# Patient Record
Sex: MALE | Race: WHITE | NOT HISPANIC OR LATINO | Employment: FULL TIME | URBAN - METROPOLITAN AREA
[De-identification: names, ages, dates, MRNs, and addresses within clinical notes are randomized per-mention and may not be internally consistent; named-entity substitution may affect disease eponyms.]

---

## 2017-01-16 DIAGNOSIS — M32.9 SYSTEMIC LUPUS ERYTHEMATOSUS (H): Primary | ICD-10-CM

## 2017-01-16 DIAGNOSIS — Z79.899 ENCOUNTER FOR LONG-TERM (CURRENT) USE OF MEDICATIONS: ICD-10-CM

## 2017-01-16 LAB
ALBUMIN UR-MCNC: NEGATIVE MG/DL
APPEARANCE UR: CLEAR
BASOPHILS # BLD AUTO: 0 10E9/L (ref 0–0.2)
BASOPHILS NFR BLD AUTO: 0.6 %
BILIRUB UR QL STRIP: NEGATIVE
COLOR UR AUTO: YELLOW
DIFFERENTIAL METHOD BLD: ABNORMAL
EOSINOPHIL # BLD AUTO: 0.3 10E9/L (ref 0–0.7)
EOSINOPHIL NFR BLD AUTO: 5.3 %
ERYTHROCYTE [DISTWIDTH] IN BLOOD BY AUTOMATED COUNT: 13.8 % (ref 10–15)
GLUCOSE UR STRIP-MCNC: NEGATIVE MG/DL
HCT VFR BLD AUTO: 43.5 % (ref 40–53)
HGB BLD-MCNC: 14.7 G/DL (ref 13.3–17.7)
HGB UR QL STRIP: ABNORMAL
KETONES UR STRIP-MCNC: NEGATIVE MG/DL
LEUKOCYTE ESTERASE UR QL STRIP: NEGATIVE
LYMPHOCYTES # BLD AUTO: 0.7 10E9/L (ref 0.8–5.3)
LYMPHOCYTES NFR BLD AUTO: 13.9 %
MCH RBC QN AUTO: 30.8 PG (ref 26.5–33)
MCHC RBC AUTO-ENTMCNC: 33.8 G/DL (ref 31.5–36.5)
MCV RBC AUTO: 91 FL (ref 78–100)
MONOCYTES # BLD AUTO: 0.6 10E9/L (ref 0–1.3)
MONOCYTES NFR BLD AUTO: 12 %
NEUTROPHILS # BLD AUTO: 3.2 10E9/L (ref 1.6–8.3)
NEUTROPHILS NFR BLD AUTO: 68.2 %
NITRATE UR QL: NEGATIVE
PH UR STRIP: 6 PH (ref 5–7)
PLATELET # BLD AUTO: 178 10E9/L (ref 150–450)
RBC # BLD AUTO: 4.78 10E12/L (ref 4.4–5.9)
RBC #/AREA URNS AUTO: NORMAL /HPF (ref 0–2)
SP GR UR STRIP: >1.03 (ref 1–1.03)
URN SPEC COLLECT METH UR: ABNORMAL
UROBILINOGEN UR STRIP-ACNC: 0.2 EU/DL (ref 0.2–1)
WBC # BLD AUTO: 4.7 10E9/L (ref 4–11)
WBC #/AREA URNS AUTO: NORMAL /HPF (ref 0–2)

## 2017-01-16 PROCEDURE — 84460 ALANINE AMINO (ALT) (SGPT): CPT | Performed by: INTERNAL MEDICINE

## 2017-01-16 PROCEDURE — 81001 URINALYSIS AUTO W/SCOPE: CPT | Performed by: INTERNAL MEDICINE

## 2017-01-16 PROCEDURE — 84450 TRANSFERASE (AST) (SGOT): CPT | Performed by: INTERNAL MEDICINE

## 2017-01-16 PROCEDURE — 82565 ASSAY OF CREATININE: CPT | Performed by: INTERNAL MEDICINE

## 2017-01-16 PROCEDURE — 85025 COMPLETE CBC W/AUTO DIFF WBC: CPT | Performed by: INTERNAL MEDICINE

## 2017-01-16 PROCEDURE — 36415 COLL VENOUS BLD VENIPUNCTURE: CPT | Performed by: INTERNAL MEDICINE

## 2017-01-17 LAB
ALT SERPL W P-5'-P-CCNC: 24 U/L (ref 0–70)
AST SERPL W P-5'-P-CCNC: 21 U/L (ref 0–45)
CREAT SERPL-MCNC: 1.07 MG/DL (ref 0.66–1.25)
GFR SERPL CREATININE-BSD FRML MDRD: 75 ML/MIN/1.7M2

## 2017-02-28 DIAGNOSIS — N20.0 RENAL STONE: Primary | ICD-10-CM

## 2017-03-01 ENCOUNTER — HOSPITAL ENCOUNTER (OUTPATIENT)
Dept: CT IMAGING | Facility: CLINIC | Age: 42
Discharge: HOME | End: 2017-03-01
Attending: INTERNAL MEDICINE | Admitting: INTERNAL MEDICINE
Payer: COMMERCIAL

## 2017-03-01 DIAGNOSIS — N20.0 RENAL STONE: ICD-10-CM

## 2017-03-01 LAB
ALBUMIN UR-MCNC: NEGATIVE MG/DL
AMORPH CRY #/AREA URNS HPF: ABNORMAL /HPF
APPEARANCE UR: CLEAR
BILIRUB UR QL STRIP: NEGATIVE
COLOR UR AUTO: YELLOW
GLUCOSE UR STRIP-MCNC: NEGATIVE MG/DL
HGB UR QL STRIP: ABNORMAL
KETONES UR STRIP-MCNC: NEGATIVE MG/DL
LEUKOCYTE ESTERASE UR QL STRIP: NEGATIVE
MUCOUS THREADS #/AREA URNS LPF: PRESENT /LPF
NITRATE UR QL: NEGATIVE
PH UR STRIP: 7 PH (ref 5–7)
RBC #/AREA URNS AUTO: ABNORMAL /HPF (ref 0–2)
SP GR UR STRIP: 1.02 (ref 1–1.03)
URN SPEC COLLECT METH UR: ABNORMAL
UROBILINOGEN UR STRIP-ACNC: 0.2 EU/DL (ref 0.2–1)
WBC #/AREA URNS AUTO: ABNORMAL /HPF (ref 0–2)

## 2017-03-01 PROCEDURE — 36415 COLL VENOUS BLD VENIPUNCTURE: CPT | Performed by: INTERNAL MEDICINE

## 2017-03-01 PROCEDURE — 81001 URINALYSIS AUTO W/SCOPE: CPT | Performed by: INTERNAL MEDICINE

## 2017-03-01 PROCEDURE — 80048 BASIC METABOLIC PNL TOTAL CA: CPT | Performed by: INTERNAL MEDICINE

## 2017-03-01 PROCEDURE — 87086 URINE CULTURE/COLONY COUNT: CPT | Performed by: INTERNAL MEDICINE

## 2017-03-01 PROCEDURE — 74176 CT ABD & PELVIS W/O CONTRAST: CPT

## 2017-03-02 LAB
ANION GAP SERPL CALCULATED.3IONS-SCNC: 9 MMOL/L (ref 3–14)
BACTERIA SPEC CULT: NORMAL
BUN SERPL-MCNC: 14 MG/DL (ref 7–30)
CALCIUM SERPL-MCNC: 9 MG/DL (ref 8.5–10.1)
CHLORIDE SERPL-SCNC: 106 MMOL/L (ref 94–109)
CO2 SERPL-SCNC: 28 MMOL/L (ref 20–32)
CREAT SERPL-MCNC: 0.96 MG/DL (ref 0.66–1.25)
GFR SERPL CREATININE-BSD FRML MDRD: 85 ML/MIN/1.7M2
GLUCOSE SERPL-MCNC: 132 MG/DL (ref 70–99)
MICRO REPORT STATUS: NORMAL
POTASSIUM SERPL-SCNC: 3.6 MMOL/L (ref 3.4–5.3)
SODIUM SERPL-SCNC: 143 MMOL/L (ref 133–144)
SPECIMEN SOURCE: NORMAL

## 2017-03-08 ENCOUNTER — TRANSFERRED RECORDS (OUTPATIENT)
Dept: HEALTH INFORMATION MANAGEMENT | Facility: CLINIC | Age: 42
End: 2017-03-08

## 2017-08-01 DIAGNOSIS — M32.9 SYSTEMIC LUPUS ERYTHEMATOSUS (H): ICD-10-CM

## 2017-08-01 DIAGNOSIS — Z79.899 ENCOUNTER FOR LONG-TERM (CURRENT) USE OF MEDICATIONS: ICD-10-CM

## 2017-08-01 LAB
ALBUMIN UR-MCNC: NEGATIVE MG/DL
ALT SERPL W P-5'-P-CCNC: 30 U/L (ref 0–70)
APPEARANCE UR: CLEAR
AST SERPL W P-5'-P-CCNC: 19 U/L (ref 0–45)
BACTERIA #/AREA URNS HPF: ABNORMAL /HPF
BASOPHILS # BLD AUTO: 0 10E9/L (ref 0–0.2)
BASOPHILS NFR BLD AUTO: 0.9 %
BILIRUB UR QL STRIP: NEGATIVE
COLOR UR AUTO: YELLOW
CREAT SERPL-MCNC: 1.05 MG/DL (ref 0.66–1.25)
DIFFERENTIAL METHOD BLD: NORMAL
EOSINOPHIL # BLD AUTO: 0.4 10E9/L (ref 0–0.7)
EOSINOPHIL NFR BLD AUTO: 8.4 %
ERYTHROCYTE [DISTWIDTH] IN BLOOD BY AUTOMATED COUNT: 13.5 % (ref 10–15)
GFR SERPL CREATININE-BSD FRML MDRD: 77 ML/MIN/1.7M2
GLUCOSE UR STRIP-MCNC: NEGATIVE MG/DL
HCT VFR BLD AUTO: 42.6 % (ref 40–53)
HGB BLD-MCNC: 14.6 G/DL (ref 13.3–17.7)
HGB UR QL STRIP: ABNORMAL
KETONES UR STRIP-MCNC: NEGATIVE MG/DL
LEUKOCYTE ESTERASE UR QL STRIP: NEGATIVE
LYMPHOCYTES # BLD AUTO: 1 10E9/L (ref 0.8–5.3)
LYMPHOCYTES NFR BLD AUTO: 20.9 %
MCH RBC QN AUTO: 31.2 PG (ref 26.5–33)
MCHC RBC AUTO-ENTMCNC: 34.3 G/DL (ref 31.5–36.5)
MCV RBC AUTO: 91 FL (ref 78–100)
MONOCYTES # BLD AUTO: 0.5 10E9/L (ref 0–1.3)
MONOCYTES NFR BLD AUTO: 9.7 %
MUCOUS THREADS #/AREA URNS LPF: PRESENT /LPF
NEUTROPHILS # BLD AUTO: 2.8 10E9/L (ref 1.6–8.3)
NEUTROPHILS NFR BLD AUTO: 60.1 %
NITRATE UR QL: NEGATIVE
NON-SQ EPI CELLS #/AREA URNS LPF: ABNORMAL /LPF
PH UR STRIP: 7 PH (ref 5–7)
PLATELET # BLD AUTO: 172 10E9/L (ref 150–450)
RBC # BLD AUTO: 4.68 10E12/L (ref 4.4–5.9)
RBC #/AREA URNS AUTO: ABNORMAL /HPF (ref 0–2)
SP GR UR STRIP: 1.02 (ref 1–1.03)
URN SPEC COLLECT METH UR: ABNORMAL
UROBILINOGEN UR STRIP-ACNC: 0.2 EU/DL (ref 0.2–1)
WBC # BLD AUTO: 4.6 10E9/L (ref 4–11)
WBC #/AREA URNS AUTO: ABNORMAL /HPF (ref 0–2)

## 2017-08-01 PROCEDURE — 81001 URINALYSIS AUTO W/SCOPE: CPT | Performed by: INTERNAL MEDICINE

## 2017-08-01 PROCEDURE — 84450 TRANSFERASE (AST) (SGOT): CPT | Performed by: INTERNAL MEDICINE

## 2017-08-01 PROCEDURE — 84460 ALANINE AMINO (ALT) (SGPT): CPT | Performed by: INTERNAL MEDICINE

## 2017-08-01 PROCEDURE — 82565 ASSAY OF CREATININE: CPT | Performed by: INTERNAL MEDICINE

## 2017-08-01 PROCEDURE — 36415 COLL VENOUS BLD VENIPUNCTURE: CPT | Performed by: INTERNAL MEDICINE

## 2017-08-01 PROCEDURE — 85025 COMPLETE CBC W/AUTO DIFF WBC: CPT | Performed by: INTERNAL MEDICINE

## 2017-08-24 ENCOUNTER — TRANSFERRED RECORDS (OUTPATIENT)
Dept: HEALTH INFORMATION MANAGEMENT | Facility: CLINIC | Age: 42
End: 2017-08-24

## 2017-09-13 ENCOUNTER — TRANSFERRED RECORDS (OUTPATIENT)
Dept: HEALTH INFORMATION MANAGEMENT | Facility: CLINIC | Age: 42
End: 2017-09-13

## 2017-11-14 ENCOUNTER — OFFICE VISIT (OUTPATIENT)
Dept: URGENT CARE | Facility: URGENT CARE | Age: 42
End: 2017-11-14
Payer: COMMERCIAL

## 2017-11-14 ENCOUNTER — RADIANT APPOINTMENT (OUTPATIENT)
Dept: GENERAL RADIOLOGY | Facility: CLINIC | Age: 42
End: 2017-11-14
Attending: FAMILY MEDICINE
Payer: COMMERCIAL

## 2017-11-14 VITALS
TEMPERATURE: 98.3 F | HEART RATE: 88 BPM | DIASTOLIC BLOOD PRESSURE: 90 MMHG | SYSTOLIC BLOOD PRESSURE: 136 MMHG | OXYGEN SATURATION: 96 %

## 2017-11-14 DIAGNOSIS — M79.674 PAIN OF TOE OF RIGHT FOOT: ICD-10-CM

## 2017-11-14 DIAGNOSIS — S92.501A CLOSED FRACTURE OF PHALANX OF RIGHT FOURTH TOE, INITIAL ENCOUNTER: Primary | ICD-10-CM

## 2017-11-14 PROCEDURE — 99213 OFFICE O/P EST LOW 20 MIN: CPT | Performed by: FAMILY MEDICINE

## 2017-11-14 PROCEDURE — 73660 X-RAY EXAM OF TOE(S): CPT | Mod: RT

## 2017-11-14 RX ORDER — WARFARIN SODIUM 10 MG/1
TABLET ORAL
Refills: 3 | Status: ON HOLD | COMMUNITY
Start: 2017-02-06 | End: 2023-09-07

## 2017-11-14 RX ORDER — PROPRANOLOL HYDROCHLORIDE 10 MG/1
TABLET ORAL
COMMUNITY
Start: 2014-08-26 | End: 2022-11-21

## 2017-11-14 RX ORDER — AZATHIOPRINE 50 MG/1
TABLET ORAL
Refills: 1 | COMMUNITY
Start: 2017-02-06 | End: 2020-12-11

## 2017-11-14 ASSESSMENT — PAIN SCALES - GENERAL: PAINLEVEL: EXTREME PAIN (8)

## 2017-11-14 NOTE — MR AVS SNAPSHOT
After Visit Summary   11/14/2017    Jim Flores    MRN: [id number]           Patient Information     Date Of Birth          1975        Visit Information        Provider Department      11/14/2017 6:10 PM Paris Briones MD [facility]        Today's Diagnoses     Closed fracture of phalanx of right fourth toe, initial encounter    -  1    Pain of toe of right foot           Follow-ups after your visit        Who to contact     If you have questions or need follow up information about today's clinic visit or your schedule please contact [facility] directly at [phone].  Normal or non-critical lab and imaging results will be communicated to you by MyChart, letter or phone within 4 business days after the clinic has received the results. If you do not hear from us within 7 days, please contact the clinic through [employer] or phone. If you have a critical or abnormal lab result, we will notify you by phone as soon as possible.  Submit refill requests through [employer] or call your pharmacy and they will forward the refill request to us. Please allow 3 business days for your refill to be completed.          Additional Information About Your Visit        MyChart Information     [employer] gives you secure access to your electronic health record. If you see a primary care provider, you can also send messages to your care team and make appointments. If you have questions, please call your primary care clinic.  If you do not have a primary care provider, please call [phone] and they will assist you.        Care EveryWhere ID     This is your Care EveryWhere ID. This could be used by other organizations to access your [place] medical records  [id number]        Your Vitals Were     Pulse Temperature Pulse Oximetry             88 98.3  F (36.8  C) (Oral) 96%          Blood Pressure from Last 3 Encounters:   11/14/17 136/90   06/06/16 142/86   03/25/16 122/68    Weight  from Last 3 Encounters:   03/25/16 180 lb 11.2 oz (82 kg)   01/12/16 182 lb 1.6 oz (82.6 kg)   11/24/15 178 lb 9.6 oz (81 kg)                 Today's Medication Changes          These changes are accurate as of: 11/14/17  8:29 PM.  If you have any questions, ask your nurse or doctor.               Start taking these medicines.        Dose/Directions    order for DME   Used for:  Closed fracture of phalanx of right fourth toe, initial encounter   Started by:  Paris Briones MD        Post-op shoe   Quantity:  1 each   Refills:  0            Where to get your medicines      Some of these will need a paper prescription and others can be bought over the counter.  Ask your nurse if you have questions.     Bring a paper prescription for each of these medications     order for DME                Primary Care Provider Office Phone # Fax #    Good Mooney [phone] [address] [place] [address]        Equal Access to Services     [facility] AH: Hadii aad ku hadasho Soomaali, waaxda luqadaha, qaybta kaalmada adeegyada, waxay idiin haydenilsonn ari rosado . So [facility] [phone].    ATENCIÓN: Si habla español, tiene a dang disposición servicios gratuitos de asistencia lingüística. Llame al [phone].    We comply with applicable federal civil rights laws and [place] laws. We do not discriminate on the basis of race, color, national origin, age, disability, sex, sexual orientation, or gender identity.            Thank you!     Thank you for choosing [facility]  for your care. Our goal is always to provide you with excellent care. Hearing back from our patients is one way we can continue to improve our services. Please take a few minutes to complete the written survey that you may receive in the mail after your visit with us. Thank you!             Your Updated Medication List - Protect others around you: Learn how to safely use, store and throw away your  medicines at www.disposemymeds.org.          This list is accurate as of: 11/14/17  8:29 PM.  Always use your most recent med list.                   Brand Name Dispense Instructions for use Diagnosis    Acetyl-L-Carnitine HCl Powd      Take 500 mg by mouth daily        azaTHIOprine 50 MG tablet    IMURAN     TK 1 T PO BID        BUSPAR 15 MG tablet   Generic drug:  busPIRone     90 tablet    Take 2 tablets by mouth 2 times daily.        desonide 0.05 % cream    DESOWEN     Apply topically three times a week        DOCOSAHEXAENOIC ACID PO      3,000mg daily        Fish Oil 1200 MG Cpdr      Take 2,400 Units by mouth daily        FLONASE 50 MCG/ACT spray   Generic drug:  fluticasone      Spray 1-2 sprays into both nostrils daily as needed for rhinitis or allergies        hydrochlorothiazide 25 MG tablet    HYDRODIURIL    90 tablet    Take 1 tablet (25 mg) by mouth daily    Calculus of kidney       LORazepam 0.5 MG tablet    ATIVAN    30 tablet    Take 1 tablet by mouth every 6 hours as needed for anxiety.        NALTREXONE HCL PO      Take 4.5 mg by mouth daily        NONFORMULARY      Take 2 capsules by mouth every morning Methyl Guard Plus        order for DME     1 each    Post-op shoe    Closed fracture of phalanx of right fourth toe, initial encounter       PLAQUENIL 200 MG tablet   Generic drug:  hydroxychloroquine      Take 200 mg by mouth 2 times daily.        predniSONE 5 MG tablet    DELTASONE     Take 3 tablets (15 mg) by mouth daily        PROBIOTIC DAILY PO      Take 1 capsule by mouth daily        propranolol 10 MG tablet    INDERAL     prn        venlafaxine 150 MG 24 hr capsule    EFFEXOR-XR    30 capsule    Take 1 capsule by mouth 2 times daily.        VITAMIN D (CHOLECALCIFEROL) PO      Take 2,000 Units by mouth 2 times daily        VITAMIN E NATURAL PO      Take 400 Units by mouth daily        * warfarin 4 MG tablet    COUMADIN    8 tablet    Take 2 tablets (8 mg) by mouth daily for 4 days    Lupus  anticoagulant disorder (H)       * warfarin 10 MG tablet    COUMADIN     TK 1.5 TS D OR AS DIRECTED BASED ON LAB RESULTS        * Notice:  This list has 2 medication(s) that are the same as other medications prescribed for you. Read the directions carefully, and ask your doctor or other care provider to review them with you.

## 2017-11-15 NOTE — NURSING NOTE
"Chief Complaint   Patient presents with     Urgent Care     Patient ran into door frame this morning and is experiencing pain, swelling and bruising in his 4th toe on his right foot.       Initial /90 (BP Location: Right arm, Patient Position: Sitting, Cuff Size: Adult Regular)  Pulse 88  Temp 98.3  F (36.8  C) (Oral)  SpO2 96% Estimated body mass index is 26.68 kg/(m^2) as calculated from the following:    Height as of 3/25/16: 5' 9\" (1.753 m).    Weight as of 3/25/16: 180 lb 11.2 oz (82 kg).  Medication Reconciliation: complete   Sakshi Phelps CMA (AAMA)            "

## 2017-11-15 NOTE — PROGRESS NOTES
SUBJECTIVE:  Chief Complaint   Patient presents with     Urgent Care     Patient ran into door frame this morning and is experiencing pain, swelling and bruising in his 4th toe on his right foot.     Jim Flores is a 42 year old male presents with a chief complaint of right foot pain and swelling, bruising on 4th toe.  The injury occurred 10 hours(s) ago.   The injury happened while at home, stubbed his toe. The patient complained of severe pain  and has not had decreased ROM.  Pain exacerbated by walking, weight-bearing and movement.  Relieved by nothing.  He treated it initially with no therapy. This is the first time this type of injury has occurred to this patient.     Was able to work all day, the pain is improving but still present. He is able to walk but with a limp. On steroids for SLE and wanted to be seen because of increased risk of fracture due to steroid use.     Past Medical History:   Diagnosis Date     Antiphospholipid antibody positive      Anxiety state, unspecified      Hypercalciuria      Nephrolithiasis      Pulmonary infarct (H) 9/1/2008     SLE with normal kidneys (H)      Current Outpatient Prescriptions   Medication Sig Dispense Refill     DOCOSAHEXAENOIC ACID PO 3,000mg daily       propranolol (INDERAL) 10 MG tablet prn       predniSONE (DELTASONE) 5 MG tablet Take 3 tablets (15 mg) by mouth daily       hydrochlorothiazide (HYDRODIURIL) 25 MG tablet Take 1 tablet (25 mg) by mouth daily 90 tablet 3     VITAMIN D, CHOLECALCIFEROL, PO Take 2,000 Units by mouth 2 times daily       desonide (DESOWEN) 0.05 % cream Apply topically three times a week       Omega-3 Fatty Acids (FISH OIL) 1200 MG CPDR Take 2,400 Units by mouth daily       fluticasone (FLONASE) 50 MCG/ACT nasal spray Spray 1-2 sprays into both nostrils daily as needed for rhinitis or allergies       Probiotic Product (PROBIOTIC DAILY PO) Take 1 capsule by mouth daily       NONFORMULARY Take 2 capsules by mouth every morning Methyl  Guard Plus       Acetylcarnitine HCl (ACETYL-L-CARNITINE HCL) POWD Take 500 mg by mouth daily        NALTREXONE HCL PO Take 4.5 mg by mouth daily        hydroxychloroquine (PLAQUENIL) 200 MG tablet Take 200 mg by mouth 2 times daily.       busPIRone (BUSPAR) 15 MG tablet Take 2 tablets by mouth 2 times daily. 90 tablet 0     venlafaxine (EFFEXOR-XR) 150 MG 24 hr capsule Take 1 capsule by mouth 2 times daily. 30 capsule 1     LORazepam (ATIVAN) 0.5 MG tablet Take 1 tablet by mouth every 6 hours as needed for anxiety. 30 tablet 0     azaTHIOprine (IMURAN) 50 MG tablet TK 1 T PO BID  1     warfarin (COUMADIN) 10 MG tablet TK 1.5 TS D OR AS DIRECTED BASED ON LAB RESULTS  3     VITAMIN E NATURAL PO Take 400 Units by mouth daily        Social History   Substance Use Topics     Smoking status: Never Smoker     Smokeless tobacco: Never Used     Alcohol use 0.0 oz/week     0 Standard drinks or equivalent per week      Comment: three per week       ROS:  Review of systems negative except as stated above.    EXAM:   /90 (BP Location: Right arm, Patient Position: Sitting, Cuff Size: Adult Regular)  Pulse 88  Temp 98.3  F (36.8  C) (Oral)  SpO2 96%  Gen: healthy,alert,no distress  Extremity: toe(s) fourth has pain with palpation. No bruising or swelling noted  There is not compromise to the distal circulation.  Pulses are +2 and CRT is brisk  GENERAL APPEARANCE: healthy, alert and no distress  EXTREMITIES: peripheral pulses normal  SKIN: no suspicious lesions or rashes  NEURO: mentation intact, speech normal and sensation intact in the R foot all dermatomes    X-RAY was done. Formal read pending. Initial read showed nondisplaced fracture of the right fourth phalanx.     ASSESSMENT/PLAN:  Jim was seen today for urgent care.    Diagnoses and all orders for this visit:    Closed fracture of phalanx of right fourth toe, initial encounter  -     order for DME; Post-op shoe    Pain of toe of right foot  -     XR Toe Right  G/E 2 Views; Future      Post-op shoe for the next week as needed. Also discussed buddy taping the toe as needed.  Tylenol for pain control.  Continue ice.  Can also apply arnica gel to help with bruising and swelling.    Pt seen in conjunction with YUDI Hawk student, who served as a scribe for this encounter. I have reviewed the ROS and PSFH documented by the student.  I performed the pertinent history, exam, and assessment and plan components as documented above.

## 2017-11-18 DIAGNOSIS — M32.9 SYSTEMIC LUPUS ERYTHEMATOSUS (H): ICD-10-CM

## 2017-11-18 DIAGNOSIS — Z79.899 ENCOUNTER FOR LONG-TERM (CURRENT) USE OF MEDICATIONS: ICD-10-CM

## 2017-11-18 LAB
ALBUMIN UR-MCNC: NEGATIVE MG/DL
AMORPH CRY #/AREA URNS HPF: ABNORMAL /HPF
APPEARANCE UR: CLEAR
BASOPHILS # BLD AUTO: 0 10E9/L (ref 0–0.2)
BASOPHILS NFR BLD AUTO: 0.7 %
BILIRUB UR QL STRIP: NEGATIVE
COLOR UR AUTO: YELLOW
DIFFERENTIAL METHOD BLD: NORMAL
EOSINOPHIL # BLD AUTO: 0.2 10E9/L (ref 0–0.7)
EOSINOPHIL NFR BLD AUTO: 4.1 %
ERYTHROCYTE [DISTWIDTH] IN BLOOD BY AUTOMATED COUNT: 14.1 % (ref 10–15)
GLUCOSE UR STRIP-MCNC: NEGATIVE MG/DL
HCT VFR BLD AUTO: 45.7 % (ref 40–53)
HGB BLD-MCNC: 15.4 G/DL (ref 13.3–17.7)
HGB UR QL STRIP: ABNORMAL
KETONES UR STRIP-MCNC: NEGATIVE MG/DL
LEUKOCYTE ESTERASE UR QL STRIP: NEGATIVE
LYMPHOCYTES # BLD AUTO: 0.9 10E9/L (ref 0.8–5.3)
LYMPHOCYTES NFR BLD AUTO: 19.9 %
MCH RBC QN AUTO: 30.9 PG (ref 26.5–33)
MCHC RBC AUTO-ENTMCNC: 33.7 G/DL (ref 31.5–36.5)
MCV RBC AUTO: 92 FL (ref 78–100)
MONOCYTES # BLD AUTO: 0.4 10E9/L (ref 0–1.3)
MONOCYTES NFR BLD AUTO: 9.4 %
NEUTROPHILS # BLD AUTO: 3 10E9/L (ref 1.6–8.3)
NEUTROPHILS NFR BLD AUTO: 65.9 %
NITRATE UR QL: NEGATIVE
PH UR STRIP: 8 PH (ref 5–7)
PLATELET # BLD AUTO: 179 10E9/L (ref 150–450)
RBC # BLD AUTO: 4.99 10E12/L (ref 4.4–5.9)
RBC #/AREA URNS AUTO: ABNORMAL /HPF
SOURCE: ABNORMAL
SP GR UR STRIP: 1.02 (ref 1–1.03)
UROBILINOGEN UR STRIP-ACNC: 0.2 EU/DL (ref 0.2–1)
WBC # BLD AUTO: 4.6 10E9/L (ref 4–11)
WBC #/AREA URNS AUTO: ABNORMAL /HPF

## 2017-11-18 PROCEDURE — 84460 ALANINE AMINO (ALT) (SGPT): CPT | Performed by: INTERNAL MEDICINE

## 2017-11-18 PROCEDURE — 82565 ASSAY OF CREATININE: CPT | Performed by: INTERNAL MEDICINE

## 2017-11-18 PROCEDURE — 85025 COMPLETE CBC W/AUTO DIFF WBC: CPT | Performed by: INTERNAL MEDICINE

## 2017-11-18 PROCEDURE — 84450 TRANSFERASE (AST) (SGOT): CPT | Performed by: INTERNAL MEDICINE

## 2017-11-18 PROCEDURE — 36415 COLL VENOUS BLD VENIPUNCTURE: CPT | Performed by: INTERNAL MEDICINE

## 2017-11-18 PROCEDURE — 81001 URINALYSIS AUTO W/SCOPE: CPT | Performed by: INTERNAL MEDICINE

## 2017-11-19 LAB
ALT SERPL W P-5'-P-CCNC: 32 U/L (ref 0–70)
AST SERPL W P-5'-P-CCNC: 23 U/L (ref 0–45)
CREAT SERPL-MCNC: 0.98 MG/DL (ref 0.66–1.25)
GFR SERPL CREATININE-BSD FRML MDRD: 83 ML/MIN/1.7M2

## 2017-12-07 ENCOUNTER — TRANSFERRED RECORDS (OUTPATIENT)
Dept: HEALTH INFORMATION MANAGEMENT | Facility: CLINIC | Age: 42
End: 2017-12-07

## 2018-01-18 ENCOUNTER — MYC MEDICAL ADVICE (OUTPATIENT)
Dept: PEDIATRICS | Facility: CLINIC | Age: 43
End: 2018-01-18

## 2018-01-18 NOTE — TELEPHONE ENCOUNTER
Please see pt's MC message. Advised pt to make an appointment with . LOV with  was on 03/25/16.    Anjelica, RN  Triage Nurse

## 2018-01-22 ENCOUNTER — OFFICE VISIT (OUTPATIENT)
Dept: PEDIATRICS | Facility: CLINIC | Age: 43
End: 2018-01-22
Payer: COMMERCIAL

## 2018-01-22 VITALS
HEART RATE: 86 BPM | DIASTOLIC BLOOD PRESSURE: 82 MMHG | TEMPERATURE: 97.7 F | WEIGHT: 189 LBS | SYSTOLIC BLOOD PRESSURE: 116 MMHG | HEIGHT: 69 IN | BODY MASS INDEX: 27.99 KG/M2 | OXYGEN SATURATION: 97 %

## 2018-01-22 DIAGNOSIS — N20.0 RENAL STONE: ICD-10-CM

## 2018-01-22 DIAGNOSIS — Z00.00 ROUTINE GENERAL MEDICAL EXAMINATION AT A HEALTH CARE FACILITY: Primary | ICD-10-CM

## 2018-01-22 DIAGNOSIS — D68.62 LUPUS ANTICOAGULANT DISORDER (H): ICD-10-CM

## 2018-01-22 DIAGNOSIS — M32.12 OTHER SYSTEMIC LUPUS ERYTHEMATOSUS WITH PERICARDITIS (H): ICD-10-CM

## 2018-01-22 LAB
CHOLEST SERPL-MCNC: 162 MG/DL
GLUCOSE SERPL-MCNC: 85 MG/DL (ref 70–99)
HDLC SERPL-MCNC: 43 MG/DL
LDLC SERPL CALC-MCNC: 106 MG/DL
NONHDLC SERPL-MCNC: 119 MG/DL
TRIGL SERPL-MCNC: 66 MG/DL

## 2018-01-22 PROCEDURE — 82947 ASSAY GLUCOSE BLOOD QUANT: CPT | Performed by: INTERNAL MEDICINE

## 2018-01-22 PROCEDURE — 99396 PREV VISIT EST AGE 40-64: CPT | Performed by: INTERNAL MEDICINE

## 2018-01-22 PROCEDURE — 80061 LIPID PANEL: CPT | Performed by: INTERNAL MEDICINE

## 2018-01-22 PROCEDURE — 82627 DEHYDROEPIANDROSTERONE: CPT | Performed by: INTERNAL MEDICINE

## 2018-01-22 PROCEDURE — 36415 COLL VENOUS BLD VENIPUNCTURE: CPT | Performed by: INTERNAL MEDICINE

## 2018-01-22 PROCEDURE — 83090 ASSAY OF HOMOCYSTEINE: CPT | Performed by: INTERNAL MEDICINE

## 2018-01-22 RX ORDER — PREDNISONE 5 MG/1
7.5 TABLET ORAL DAILY
COMMUNITY
Start: 2018-01-22 | End: 2023-09-29

## 2018-01-22 NOTE — PROGRESS NOTES
SUBJECTIVE:   CC: Jim Flores is an 42 year old male who presents for preventative health visit.     Physical   Annual:     Getting at least 3 servings of Calcium per day::  Yes    Bi-annual eye exam::  Yes    Dental care twice a year::  NO    Sleep apnea or symptoms of sleep apnea::  None    Diet::  Gluten-free/reduced    Frequency of exercise::  None    Taking medications regularly::  Yes    Medication side effects::  None    Additional concerns today::  YES          Recurrent renal stones. Had been working through the [employer]. Changing to Apollidon.  Requesting updated CT scan to further evaluate. Continues to have symptoms. Underwent 2 procedures. Continues to have more stones.     SLE / Sjogren's. Hx of Lupus anticoagulant. Followed by rheum and hematology. Treated w/ immunosuppressant medications plus anticoagulation. Chronic prednisone use. Current dose is 7.5 mg QD.     Anxiety and depression. Sx are managed by psychiatry. Overall he feels that his mood is good. Tolerating medications well.       Today's PHQ-2 Score:   PHQ-2 ( 1999 [employer]) 1/18/2018   Q1: Little interest or pleasure in doing things 0   Q2: Feeling down, depressed or hopeless 0   PHQ-2 Score 0   Q1: Little interest or pleasure in doing things Not at all   Q2: Feeling down, depressed or hopeless Not at all   PHQ-2 Score 0       Abuse: Current or Past(Physical, Sexual or Emotional)- No  Do you feel safe in your environment - Yes    Social History   Substance Use Topics     Smoking status: Never Smoker     Smokeless tobacco: Never Used     Alcohol use 0.0 oz/week     0 Standard drinks or equivalent per week      Comment: three per week     Alcohol Use 1/18/2018   If you drink alcohol, do you typically have greater than 3 drinks per day OR greater than 7 drinks per week?   No   No flowsheet data found.      Last PSA: PSA   Date Value Ref Range Status   08/23/2008 0.75 0 - 4 ug/L Final       Reviewed orders with patient. Reviewed health maintenance  "and updated orders accordingly - Yes    Labs reviewed in EPIC    Reviewed and updated as needed this visit by clinical staff  Tobacco  Meds  Med Hx  Surg Hx  Fam Hx  Soc Hx        Reviewed and updated as needed this visit by Provider  Tobacco  Allergies  Meds  Problems  Med Hx  Surg Hx  Fam Hx  Soc Hx           Review of Systems  C: NEGATIVE for fever, chills, change in weight  I: NEGATIVE for worrisome rashes, moles or lesions  E: NEGATIVE for vision changes or irritation. Intermittent dry eye.   ENT: NEGATIVE for ear, mouth and throat problems  R: NEGATIVE for significant cough or SOB  CV: NEGATIVE for chest pain, palpitations or peripheral edema. Hx of pericarditis w/ SLE.  GI: NEGATIVE for nausea, abdominal pain, heartburn, or change in bowel habits  : Per HPI   M: NEGATIVE for significant arthralgias or myalgia currently. Occasional joint pains w/ SLE.   N: NEGATIVE for weakness, dizziness or paresthesias  P: NEGATIVE for changes in mood or affect    OBJECTIVE:   /82 (Cuff Size: Adult Regular)  Pulse 86  Temp 97.7  F (36.5  C) (Oral)  Ht 5' 9\" (1.753 m)  Wt 189 lb (85.7 kg)  SpO2 97%  BMI 27.91 kg/m2    Physical Exam  GENERAL: healthy, alert and no distress  EYES: Eyes grossly normal to inspection, PERRL and conjunctivae and sclerae normal  HENT: ear canals and TM's normal, nose and mouth without ulcers or lesions  NECK: no adenopathy, no asymmetry, masses, or scars and thyroid normal to palpation  RESP: lungs clear to auscultation - no rales, rhonchi or wheezes  CV: regular rate and rhythm, normal S1 S2, no S3 or S4, no murmur, click or rub, no peripheral edema and peripheral pulses strong  ABDOMEN: soft, nontender, no hepatosplenomegaly, no masses and bowel sounds normal  MS: no gross musculoskeletal defects noted, no edema  SKIN: no suspicious lesions or rashes  NEURO: Normal strength and tone, mentation intact and speech normal  PSYCH: mentation appears normal, affect " "normal/bright    ASSESSMENT/PLAN:       ICD-10-CM    1. Routine general medical examination at a health care facility Z00.00 Homocysteine     DHEA sulfate     Lipid Profile (Chol, Trig, HDL, LDL calc)     Glucose   2. Renal stone N20.0 CT Abdomen Pelvis w/o Contrast   3. Lupus anticoagulant disorder (H) D68.62    4. 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 He has never used smokeless tobacco.      Estimated body mass index is 27.91 kg/(m^2) as calculated from the following:    Height as of this encounter: 5' 9\" (1.753 m).    Weight as of this encounter: 189 lb (85.7 kg).   Weight management plan: Discussed healthy diet and exercise guidelines and patient will follow up in 12 months in clinic to re-evaluate.    Good Mooney MD  [facility]  "

## 2018-01-22 NOTE — NURSING NOTE
"Chief Complaint   Patient presents with     Physical       Initial /82 (Cuff Size: Adult Regular)  Pulse 86  Temp 97.7  F (36.5  C) (Oral)  Ht 5' 9\" (1.753 m)  Wt 189 lb (85.7 kg)  SpO2 97%  BMI 27.91 kg/m2 Estimated body mass index is 27.91 kg/(m^2) as calculated from the following:    Height as of this encounter: 5' 9\" (1.753 m).    Weight as of this encounter: 189 lb (85.7 kg).  Medication Reconciliation: complete    Casandra Medina   "

## 2018-01-22 NOTE — MR AVS SNAPSHOT
After Visit Summary   1/22/2018    Jim Flores    MRN: [id number]           Patient Information     Date Of Birth          1975        Visit Information        Provider Department      1/22/2018 8:50 AM Good Mooney MD [facility]        Today's Diagnoses     Routine general medical examination at a health care facility    -  1    Renal stone        Lupus anticoagulant disorder (H)        Other forms of systemic lupus erythematosus, unspecified organ involvement status (H)          Care Instructions      Preventive Health Recommendations    Yearly exam:             See your health care provider every year in order to  o   Review health changes.   o   Discuss preventive care.      o   Review your medicines.     Have a cholesterol test at least every 5 years.     Have a colonoscopy (test for colon cancer) if someone in your family has had colon cancer or polyps before age 50.     Shots: Get a flu shot each year. Get a tetanus shot every 10 years.     Nutrition:    Eat at least 5 servings of fruits and vegetables daily.     Eat whole-grain bread, whole-wheat pasta and brown rice instead of white grains and rice.     Get adequate Calcium and Vitamin D.     Lifestyle    Exercise for at least 150 minutes a week (30 minutes a day, 5 days a week). This will help you control your weight and prevent disease.     Limit alcohol to one drink per day.     No smoking.     Wear sunscreen to prevent skin cancer.     See your dentist every six months for an exam and cleaning.              Follow-ups after your visit        Future tests that were ordered for you today     Open Future Orders        Priority Expected Expires Ordered    CT Abdomen Pelvis w/o Contrast Routine  1/22/2019 1/22/2018            Who to contact     If you have questions or need follow up information about today's clinic visit or your schedule please contact [facility] [place] directly at [phone].  Normal or  "non-critical lab and imaging results will be communicated to you by [employer], letter or phone within 4 business days after the clinic has received the results. If you do not hear from us within 7 days, please contact the clinic through [employer] or phone. If you have a critical or abnormal lab result, we will notify you by phone as soon as possible.  Submit refill requests through [employer] or call your pharmacy and they will forward the refill request to us. Please allow 3 business days for your refill to be completed.          Additional Information About Your Visit        [employer] Information     [employer] gives you secure access to your electronic health record. If you see a primary care provider, you can also send messages to your care team and make appointments. If you have questions, please call your primary care clinic.  If you do not have a primary care provider, please call [phone] and they will assist you.        Care EveryWhere ID     This is your Care EveryWhere ID. This could be used by other organizations to access your [place] medical records  [id number]        Your Vitals Were     Pulse Temperature Height Pulse Oximetry BMI (Body Mass Index)       86 97.7  F (36.5  C) (Oral) 5' 9\" (1.753 m) 97% 27.91 kg/m2        Blood Pressure from Last 3 Encounters:   01/22/18 116/82   11/14/17 136/90   06/06/16 142/86    Weight from Last 3 Encounters:   01/22/18 189 lb (85.7 kg)   03/25/16 180 lb 11.2 oz (82 kg)   01/12/16 182 lb 1.6 oz (82.6 kg)              We Performed the Following     DHEA sulfate     Glucose     Homocysteine     Lipid Profile (Chol, Trig, HDL, LDL calc)          Today's Medication Changes          These changes are accurate as of: 1/22/18  9:27 AM.  If you have any questions, ask your nurse or doctor.               These medicines have changed or have updated prescriptions.        Dose/Directions    predniSONE 5 MG tablet   Commonly known as:  DELTASONE   This may have changed:  how much to " take   Changed by:  Good Mooney MD        Dose:  7.5 mg   Take 1.5 tablets (7.5 mg) by mouth daily   Refills:  0                Primary Care Provider Office Phone # Fax #    Good Mooney [phone] [address] [place] [address]        Equal Access to Services     [facility] AH: Hadii aad ku hadasho Soomaali, waaxda luqadaha, qaybta kaalmada adeegyada, waxay idiin hayaan adeeg jaspreet lazacharyn . So [facility] [phone].    ATENCIÓN: Si habla español, tiene a dang disposición servicios gratuitos de asistencia lingüística. [facility] [phone].    We comply with applicable federal civil rights laws and [place] laws. We do not discriminate on the basis of race, color, national origin, age, disability, sex, sexual orientation, or gender identity.            Thank you!     [facility]  [facility] your care. Our goal is always to provide you with excellent care. Hearing back from our patients is one way we can continue to improve our services. Please take a few minutes to complete the written survey that you may receive in the mail after your visit with us. Thank you!             Your Updated Medication List - Protect others around you: Learn how to safely use, store and throw away your medicines at www.disposemymeds.org.          This list is accurate as of: 1/22/18  9:27 AM.  Always use your most recent med list.                   Brand Name Dispense Instructions for use Diagnosis    azaTHIOprine 50 MG tablet    IMURAN     TK 1 T PO BID        BUSPAR 15 MG tablet   Generic drug:  busPIRone     90 tablet    Take 2 tablets by mouth 2 times daily.        desonide 0.05 % cream    DESOWEN     Apply topically three times a week        DOCOSAHEXAENOIC ACID PO      3,000mg daily        Fish Oil 1200 MG Cpdr      Take 2,400 Units by mouth daily        FLONASE 50 MCG/ACT spray   Generic drug:  fluticasone      Spray 1-2 sprays into both nostrils daily as needed  for rhinitis or allergies        hydrochlorothiazide 25 MG tablet    HYDRODIURIL    90 tablet    Take 1 tablet (25 mg) by mouth daily    Calculus of kidney       LORazepam 0.5 MG tablet    ATIVAN    30 tablet    Take 1 tablet by mouth every 6 hours as needed for anxiety.        NONFORMULARY      Take 2 capsules by mouth every morning Methyl Guard Plus        PLAQUENIL 200 MG tablet   Generic drug:  hydroxychloroquine      Take 200 mg by mouth 2 times daily.        predniSONE 5 MG tablet    DELTASONE     Take 1.5 tablets (7.5 mg) by mouth daily        PROBIOTIC DAILY PO      Take 1 capsule by mouth daily        propranolol 10 MG tablet    INDERAL     prn        venlafaxine 150 MG 24 hr capsule    EFFEXOR-XR    30 capsule    Take 1 capsule by mouth 2 times daily.        VITAMIN D (CHOLECALCIFEROL) PO      Take 2,000 Units by mouth 2 times daily        warfarin 10 MG tablet    COUMADIN     TK 1.5 TS D OR AS DIRECTED BASED ON LAB RESULTS

## 2018-01-22 NOTE — PATIENT INSTRUCTIONS
Preventive Health Recommendations    Yearly exam:             See your health care provider every year in order to  o   Review health changes.   o   Discuss preventive care.      o   Review your medicines.     Have a cholesterol test at least every 5 years.     Have a colonoscopy (test for colon cancer) if someone in your family has had colon cancer or polyps before age 50.     Shots: Get a flu shot each year. Get a tetanus shot every 10 years.     Nutrition:    Eat at least 5 servings of fruits and vegetables daily.     Eat whole-grain bread, whole-wheat pasta and brown rice instead of white grains and rice.     Get adequate Calcium and Vitamin D.     Lifestyle    Exercise for at least 150 minutes a week (30 minutes a day, 5 days a week). This will help you control your weight and prevent disease.     Limit alcohol to one drink per day.     No smoking.     Wear sunscreen to prevent skin cancer.     See your dentist every six months for an exam and cleaning.

## 2018-01-23 LAB — DHEA-S SERPL-MCNC: 19 UG/DL (ref 80–560)

## 2018-01-24 LAB — HCYS SERPL-SCNC: 6.4 UMOL/L (ref 4–12)

## 2018-02-01 ENCOUNTER — HOSPITAL ENCOUNTER (OUTPATIENT)
Dept: CT IMAGING | Facility: CLINIC | Age: 43
Discharge: HOME | End: 2018-02-01
Attending: INTERNAL MEDICINE | Admitting: INTERNAL MEDICINE
Payer: COMMERCIAL

## 2018-02-01 DIAGNOSIS — N20.0 RENAL STONE: ICD-10-CM

## 2018-02-01 PROCEDURE — 74176 CT ABD & PELVIS W/O CONTRAST: CPT

## 2018-02-02 ENCOUNTER — MYC MEDICAL ADVICE (OUTPATIENT)
Dept: PEDIATRICS | Facility: CLINIC | Age: 43
End: 2018-02-02

## 2018-02-02 NOTE — TELEPHONE ENCOUNTER
Please review the MC message from pt & advise. Preliminary result is in.     Juan A CAMEJO, RN  Triage Nurse

## 2018-02-05 ENCOUNTER — MYC MEDICAL ADVICE (OUTPATIENT)
Dept: PEDIATRICS | Facility: CLINIC | Age: 43
End: 2018-02-05

## 2018-03-17 DIAGNOSIS — M32.9 SYSTEMIC LUPUS ERYTHEMATOSUS (H): Primary | ICD-10-CM

## 2018-03-17 DIAGNOSIS — Z79.899 ENCOUNTER FOR LONG-TERM (CURRENT) USE OF MEDICATIONS: ICD-10-CM

## 2018-04-02 DIAGNOSIS — M32.9 SYSTEMIC LUPUS ERYTHEMATOSUS (H): ICD-10-CM

## 2018-04-02 DIAGNOSIS — Z79.899 ENCOUNTER FOR LONG-TERM (CURRENT) USE OF MEDICATIONS: ICD-10-CM

## 2018-04-02 LAB
ALBUMIN SERPL-MCNC: 3.9 G/DL (ref 3.4–5)
ALT SERPL W P-5'-P-CCNC: 29 U/L (ref 0–70)
AST SERPL W P-5'-P-CCNC: 24 U/L (ref 0–45)
BASOPHILS # BLD AUTO: 0 10E9/L (ref 0–0.2)
BASOPHILS NFR BLD AUTO: 0.4 %
CREAT SERPL-MCNC: 1.09 MG/DL (ref 0.66–1.25)
DIFFERENTIAL METHOD BLD: NORMAL
EOSINOPHIL # BLD AUTO: 0.2 10E9/L (ref 0–0.7)
EOSINOPHIL NFR BLD AUTO: 3.8 %
ERYTHROCYTE [DISTWIDTH] IN BLOOD BY AUTOMATED COUNT: 13.9 % (ref 10–15)
GFR SERPL CREATININE-BSD FRML MDRD: 73 ML/MIN/1.7M2
HCT VFR BLD AUTO: 44.1 % (ref 40–53)
HGB BLD-MCNC: 14.6 G/DL (ref 13.3–17.7)
LYMPHOCYTES # BLD AUTO: 0.9 10E9/L (ref 0.8–5.3)
LYMPHOCYTES NFR BLD AUTO: 18.4 %
MCH RBC QN AUTO: 30.2 PG (ref 26.5–33)
MCHC RBC AUTO-ENTMCNC: 33.1 G/DL (ref 31.5–36.5)
MCV RBC AUTO: 91 FL (ref 78–100)
MONOCYTES # BLD AUTO: 0.6 10E9/L (ref 0–1.3)
MONOCYTES NFR BLD AUTO: 11.7 %
NEUTROPHILS # BLD AUTO: 3.1 10E9/L (ref 1.6–8.3)
NEUTROPHILS NFR BLD AUTO: 65.7 %
PLATELET # BLD AUTO: 190 10E9/L (ref 150–450)
RBC # BLD AUTO: 4.84 10E12/L (ref 4.4–5.9)
WBC # BLD AUTO: 4.8 10E9/L (ref 4–11)

## 2018-04-02 PROCEDURE — 85025 COMPLETE CBC W/AUTO DIFF WBC: CPT | Performed by: INTERNAL MEDICINE

## 2018-04-02 PROCEDURE — 84450 TRANSFERASE (AST) (SGOT): CPT | Performed by: INTERNAL MEDICINE

## 2018-04-02 PROCEDURE — 84460 ALANINE AMINO (ALT) (SGPT): CPT | Performed by: INTERNAL MEDICINE

## 2018-04-02 PROCEDURE — 36415 COLL VENOUS BLD VENIPUNCTURE: CPT | Performed by: INTERNAL MEDICINE

## 2018-04-02 PROCEDURE — 82040 ASSAY OF SERUM ALBUMIN: CPT | Performed by: INTERNAL MEDICINE

## 2018-04-02 PROCEDURE — 82565 ASSAY OF CREATININE: CPT | Performed by: INTERNAL MEDICINE

## 2018-04-06 ENCOUNTER — TRANSFERRED RECORDS (OUTPATIENT)
Dept: HEALTH INFORMATION MANAGEMENT | Facility: CLINIC | Age: 43
End: 2018-04-06

## 2018-04-20 ENCOUNTER — OFFICE VISIT (OUTPATIENT)
Dept: PEDIATRICS | Facility: CLINIC | Age: 43
End: 2018-04-20
Payer: COMMERCIAL

## 2018-04-20 VITALS
HEIGHT: 69 IN | SYSTOLIC BLOOD PRESSURE: 142 MMHG | WEIGHT: 180.5 LBS | DIASTOLIC BLOOD PRESSURE: 92 MMHG | TEMPERATURE: 97.7 F | BODY MASS INDEX: 26.73 KG/M2 | OXYGEN SATURATION: 99 % | HEART RATE: 88 BPM

## 2018-04-20 DIAGNOSIS — B02.9 HERPES ZOSTER WITHOUT COMPLICATION: Primary | ICD-10-CM

## 2018-04-20 DIAGNOSIS — R03.0 ELEVATED BLOOD PRESSURE READING WITHOUT DIAGNOSIS OF HYPERTENSION: ICD-10-CM

## 2018-04-20 PROCEDURE — 99213 OFFICE O/P EST LOW 20 MIN: CPT | Performed by: NURSE PRACTITIONER

## 2018-04-20 RX ORDER — VALACYCLOVIR HYDROCHLORIDE 1 G/1
1000 TABLET, FILM COATED ORAL 3 TIMES DAILY
Qty: 21 TABLET | Refills: 0 | Status: SHIPPED | OUTPATIENT
Start: 2018-04-20 | End: 2020-11-30

## 2018-04-20 ASSESSMENT — ANXIETY QUESTIONNAIRES
1. FEELING NERVOUS, ANXIOUS, OR ON EDGE: SEVERAL DAYS
7. FEELING AFRAID AS IF SOMETHING AWFUL MIGHT HAPPEN: NOT AT ALL
2. NOT BEING ABLE TO STOP OR CONTROL WORRYING: NOT AT ALL
3. WORRYING TOO MUCH ABOUT DIFFERENT THINGS: NOT AT ALL
6. BECOMING EASILY ANNOYED OR IRRITABLE: NOT AT ALL
5. BEING SO RESTLESS THAT IT IS HARD TO SIT STILL: NOT AT ALL
GAD7 TOTAL SCORE: 1
IF YOU CHECKED OFF ANY PROBLEMS ON THIS QUESTIONNAIRE, HOW DIFFICULT HAVE THESE PROBLEMS MADE IT FOR YOU TO DO YOUR WORK, TAKE CARE OF THINGS AT HOME, OR GET ALONG WITH OTHER PEOPLE: NOT DIFFICULT AT ALL

## 2018-04-20 ASSESSMENT — PATIENT HEALTH QUESTIONNAIRE - PHQ9: 5. POOR APPETITE OR OVEREATING: NOT AT ALL

## 2018-04-20 NOTE — MR AVS SNAPSHOT
"              After Visit Summary   4/20/2018    Jim Flores    MRN: [id number]           Patient Information     Date Of Birth          1975        Visit Information        Provider Department      4/20/2018 2:20 PM Emy Castellano APRN CNP [facility] [place]        Today's Diagnoses     Herpes zoster without complication    -  1      Care Instructions    -See pharmacy in 1 week for blood pressure check          Follow-ups after your visit        Who to contact     If you have questions or need follow up information about today's clinic visit or your schedule please contact [facility] directly at [phone].  Normal or non-critical lab and imaging results will be communicated to you by MyChart, letter or phone within 4 business days after the clinic has received the results. If you do not hear from us within 7 days, please contact the clinic through [employer] or phone. If you have a critical or abnormal lab result, we will notify you by phone as soon as possible.  Submit refill requests through [employer] or call your pharmacy and they will forward the refill request to us. Please allow 3 business days for your refill to be completed.          Additional Information About Your Visit        MyChart Information     [employer] gives you secure access to your electronic health record. If you see a primary care provider, you can also send messages to your care team and make appointments. If you have questions, please call your primary care clinic.  If you do not have a primary care provider, please call [phone] and they will assist you.        Care EveryWhere ID     This is your Care EveryWhere ID. This could be used by other organizations to access your [place] medical records  [id number]        Your Vitals Were     Pulse Temperature Height Pulse Oximetry BMI (Body Mass Index)       88 97.7  F (36.5  C) (Tympanic) 5' 9\" (1.753 m) 99% 26.66 kg/m2        Blood Pressure from Last 3 " Encounters:   04/20/18 (!) 142/92   01/22/18 116/82   11/14/17 136/90    Weight from Last 3 Encounters:   04/20/18 180 lb 8 oz (81.9 kg)   01/22/18 189 lb (85.7 kg)   03/25/16 180 lb 11.2 oz (82 kg)              Today, you had the following     No orders found for display         Today's Medication Changes          These changes are accurate as of 4/20/18  2:37 PM.  If you have any questions, ask your nurse or doctor.               Start taking these medicines.        Dose/Directions    valACYclovir 1000 mg tablet   Commonly known as:  VALTREX   Used for:  Herpes zoster without complication   Started by:  Emy Castellano APRN CNP        Dose:  1000 mg   Take 1 tablet (1,000 mg) by mouth 3 times daily   Quantity:  21 tablet   Refills:  0            Where to get your medicines      These medications were sent to [id number] - [place], [place] - [address]  AT [facility] & [address] [place] [address]     Phone:  [phone]     valACYclovir 1000 mg tablet                Primary Care Provider Office Phone # Fax #    [facility]oney [phone] [address] [place] [address]        Equal Access to Services     [facility]: Hadii saulo ku hadasho Soomaali, waaxda luqadaha, qaybta kaalmada adeegyada, maximiliano arredondo. So [facility] [phone].    ATENCIÓN: Si habla español, tiene a dang disposición servicios gratuitos de asistencia lingüística. Llame al [phone].    We comply with applicable federal civil rights laws and [place] laws. We do not discriminate on the basis of race, color, national origin, age, disability, sex, sexual orientation, or gender identity.            Thank you!     [facility] for choosing [facility] your care. Our goal is always to provide you with excellent care. Hearing back from our patients is one way we can continue to improve our services. Please take a few  minutes to complete the written survey that you may receive in the mail after your visit with us. Thank you!             Your Updated Medication List - Protect others around you: Learn how to safely use, store and throw away your medicines at www.disposemymeds.org.          This list is accurate as of 4/20/18  2:37 PM.  Always use your most recent med list.                   Brand Name Dispense Instructions for use Diagnosis    azaTHIOprine 50 MG tablet    IMURAN     TK 1 T PO BID        BUSPAR 15 MG tablet   Generic drug:  busPIRone     90 tablet    Take 2 tablets by mouth 2 times daily.        desonide 0.05 % cream    DESOWEN     Apply topically three times a week        DOCOSAHEXAENOIC ACID PO      3,000mg daily        Fish Oil 1200 MG Cpdr      Take 2,400 Units by mouth daily        FLONASE 50 MCG/ACT spray   Generic drug:  fluticasone      Spray 1-2 sprays into both nostrils daily as needed for rhinitis or allergies        hydrochlorothiazide 25 MG tablet    HYDRODIURIL    90 tablet    Take 1 tablet (25 mg) by mouth daily    Calculus of kidney       LORazepam 0.5 MG tablet    ATIVAN    30 tablet    Take 1 tablet by mouth every 6 hours as needed for anxiety.        NONFORMULARY      Take 2 capsules by mouth every morning Methyl Guard Plus        PLAQUENIL 200 MG tablet   Generic drug:  hydroxychloroquine      Take 200 mg by mouth 2 times daily.        predniSONE 5 MG tablet    DELTASONE     Take 1.5 tablets (7.5 mg) by mouth daily        PROBIOTIC DAILY PO      Take 1 capsule by mouth daily        propranolol 10 MG tablet    INDERAL     prn        valACYclovir 1000 mg tablet    VALTREX    21 tablet    Take 1 tablet (1,000 mg) by mouth 3 times daily    Herpes zoster without complication       venlafaxine 150 MG 24 hr capsule    EFFEXOR-XR    30 capsule    Take 1 capsule by mouth 2 times daily.        VITAMIN D (CHOLECALCIFEROL) PO      Take 2,000 Units by mouth 2 times daily        warfarin 10 MG tablet     COUMADIN     TK 1.5 TS D OR AS DIRECTED BASED ON LAB RESULTS

## 2018-04-20 NOTE — PROGRESS NOTES
"  SUBJECTIVE:   Jim Flores is a 42 year old male who presents to clinic today for the following health issues:    Rash  Possible shingles  Patient states he has lupus      Duration: 2-3 days ago    Description  Location: rash on left side chest  Itching: no    Intensity:  moderate    Accompanying signs and symptoms: painful, red raised bumps on torso - left side chest, some pain in left mid back area    History (similar episodes/previous evaluation): None    Precipitating or alleviating factors:  New exposures:  None  Recent travel: no      Therapies tried and outcome: desonide cream    BP elevated today. Asymptomatic. Takes HCTZ for kidney stones.     States lesions are sensitive but not super painful.     ROS: const/derm/cv/resp/neuro otherwise negative     OBJECTIVE:  BP (!) 142/92 (Cuff Size: Adult Regular)  Pulse 88  Temp 97.7  F (36.5  C) (Tympanic)  Ht 5' 9\" (1.753 m)  Wt 180 lb 8 oz (81.9 kg)  SpO2 99%  BMI 26.66 kg/m2  CONSTITUTIONAL: Alert, well-nourished, well-groomed, NAD  RESP: Lungs CTA. No wheeze, rhonchi, rales.  CV: HRRR S1 S2 No MRG. No peripheral edema  DERM: Scattered annular papules over entire body. Thoracic dermatomal distribution with a different appearing grouping of papular lesions, somewhat confluent over left anterior breastbone and under left pectoral.     ASSESSMENT/PLAN:  (B02.9) Herpes zoster without complication  (primary encounter diagnosis)  Comment: Shingles in immunocompromised patient. Appears well.   Plan: valACYclovir (VALTREX) 1000 mg tablet        Start valacyclovir. He agrees to call rheum and ask if he should stop his immunosuppressants. Discussed reasons to seek care urgently, such as fever, worsening lethargy, etc.     (R03.0) Elevated blood pressure reading without diagnosis of hypertension  Comment: BP slightly elevate today, possibly due to pain. He is asymptomatic.   Plan: He agrees to swing into the pharmacy in 1 week for BP recheck  Discussed reasons to " seek care urgently.           Emy Castellano, LUMA-MADELIN.

## 2018-04-21 ASSESSMENT — PATIENT HEALTH QUESTIONNAIRE - PHQ9: SUM OF ALL RESPONSES TO PHQ QUESTIONS 1-9: 3

## 2018-04-21 ASSESSMENT — ANXIETY QUESTIONNAIRES: GAD7 TOTAL SCORE: 1

## 2018-05-07 ENCOUNTER — MYC MEDICAL ADVICE (OUTPATIENT)
Dept: PEDIATRICS | Facility: CLINIC | Age: 43
End: 2018-05-07

## 2018-05-07 DIAGNOSIS — J31.0 CHRONIC RHINITIS, UNSPECIFIED TYPE: Primary | ICD-10-CM

## 2018-05-08 RX ORDER — FLUTICASONE PROPIONATE 50 MCG
1-2 SPRAY, SUSPENSION (ML) NASAL DAILY PRN
Qty: 3 BOTTLE | Refills: 3 | Status: SHIPPED | OUTPATIENT
Start: 2018-05-08 | End: 2019-05-18

## 2018-05-08 NOTE — TELEPHONE ENCOUNTER
"Requested Prescriptions   Pending Prescriptions Disp Refills     fluticasone (FLONASE) 50 MCG/ACT spray 1 Bottle      Sig: Spray 1-2 sprays into both nostrils daily as needed for rhinitis or allergies    Inhaled Steroids Protocol Passed    5/7/2018  4:03 PM       Passed - Patient is age 12 or older       Passed - Recent (12 mo) or future (30 days) visit within the authorizing provider's specialty    Patient had office visit in the last 12 months or has a visit in the next 30 days with authorizing provider or within the authorizing provider's specialty.  See \"Patient Info\" tab in inbasket, or \"Choose Columns\" in Meds & Orders section of the refill encounter.              Last Written Prescription Date:  ?  Last Fill Quantity: ?,  # refills: ?   Last office visit: 4/20/2018 with prescribing provider:  Good [facility]oney     Routing refill request to provider for review/approval because:  Medication is reported/historical    Ethel FUENTES RN, BSN, PHN  [place] [employer] RN          "

## 2018-07-18 ENCOUNTER — TRANSFERRED RECORDS (OUTPATIENT)
Dept: HEALTH INFORMATION MANAGEMENT | Facility: CLINIC | Age: 43
End: 2018-07-18

## 2018-07-22 ENCOUNTER — OFFICE VISIT (OUTPATIENT)
Dept: URGENT CARE | Facility: URGENT CARE | Age: 43
End: 2018-07-22
Payer: COMMERCIAL

## 2018-07-22 VITALS
BODY MASS INDEX: 26.73 KG/M2 | WEIGHT: 181 LBS | TEMPERATURE: 98.7 F | DIASTOLIC BLOOD PRESSURE: 90 MMHG | HEART RATE: 95 BPM | SYSTOLIC BLOOD PRESSURE: 138 MMHG | OXYGEN SATURATION: 97 %

## 2018-07-22 DIAGNOSIS — L08.9 LOCAL SKIN INFECTION: Primary | ICD-10-CM

## 2018-07-22 PROCEDURE — 99213 OFFICE O/P EST LOW 20 MIN: CPT | Performed by: FAMILY MEDICINE

## 2018-07-22 RX ORDER — CLINDAMYCIN HCL 300 MG
300 CAPSULE ORAL 4 TIMES DAILY
Qty: 40 CAPSULE | Refills: 0 | Status: SHIPPED | OUTPATIENT
Start: 2018-07-22 | End: 2018-08-01

## 2018-07-22 NOTE — PROGRESS NOTES
SUBJECTIVE:  Jim Flores is a 43 year old male who presents to the clinic today for a rash from IV site.    Patient had IV insertion in left forearm due to surgery.  The IV was removed 3 days ago, noticed lump on Friday and then redness yesterday.  This morning, rash has worsen.  No fever.    Currently on prednisone, imuran and is immunocompromise.  Wondering about blood clot in arm, was on coumadin but has to stop prior to surgery.    Past Medical History:   Diagnosis Date     Antiphospholipid antibody positive      Anxiety state, unspecified      Hypercalciuria      Nephrolithiasis      Pulmonary infarct (H) 9/1/2008     SLE with normal kidneys (H)      Current Outpatient Prescriptions   Medication Sig Dispense Refill     azaTHIOprine (IMURAN) 50 MG tablet TK 1 T PO BID  1     busPIRone (BUSPAR) 15 MG tablet Take 2 tablets by mouth 2 times daily. 90 tablet 0     desonide (DESOWEN) 0.05 % cream Apply topically three times a week       fluticasone (FLONASE) 50 MCG/ACT spray Spray 1-2 sprays into both nostrils daily as needed for rhinitis or allergies 3 Bottle 3     hydrochlorothiazide (HYDRODIURIL) 25 MG tablet Take 1 tablet (25 mg) by mouth daily 90 tablet 3     hydroxychloroquine (PLAQUENIL) 200 MG tablet Take 200 mg by mouth 2 times daily.       LORazepam (ATIVAN) 0.5 MG tablet Take 1 tablet by mouth every 6 hours as needed for anxiety. 30 tablet 0     NONFORMULARY Take 2 capsules by mouth every morning Methyl Guard Plus       Omega-3 Fatty Acids (FISH OIL) 1200 MG CPDR Take 2,400 Units by mouth daily       predniSONE (DELTASONE) 5 MG tablet Take 1.5 tablets (7.5 mg) by mouth daily       Probiotic Product (PROBIOTIC DAILY PO) Take 1 capsule by mouth daily       propranolol (INDERAL) 10 MG tablet prn       venlafaxine (EFFEXOR-XR) 150 MG 24 hr capsule Take 1 capsule by mouth 2 times daily. 30 capsule 1     VITAMIN D, CHOLECALCIFEROL, PO Take 2,000 Units by mouth 2 times daily       warfarin (COUMADIN) 10 MG  tablet TK 1.5 TS D OR AS DIRECTED BASED ON LAB RESULTS  3     DOCOSAHEXAENOIC ACID PO 3,000mg daily       valACYclovir (VALTREX) 1000 mg tablet Take 1 tablet (1,000 mg) by mouth 3 times daily (Patient not taking: Reported on 7/22/2018) 21 tablet 0     Social History   Substance Use Topics     Smoking status: Never Smoker     Smokeless tobacco: Never Used     Alcohol use 0.0 oz/week     0 Standard drinks or equivalent per week      Comment: three per week       ROS:  Review of systems negative except as stated above.    EXAM:   /90 (BP Location: Right arm, Patient Position: Chair, Cuff Size: Adult Regular)  Pulse 95  Temp 98.7  F (37.1  C) (Tympanic)  Wt 181 lb (82.1 kg)  SpO2 97%  BMI 26.73 kg/m2  GENERAL: alert, no acute distress.  SKIN: left forearm - small palpable soft mass at site of prior IV insertion site, extending pink patch, mild warmth.  No vesicle, no scaling.  Bruising in right antecubital fossa   EXT: warm, dry, pulse strong  PSYCH: mentation appears normal and affect normal/bright    ASSESSMENT/PLAN:  (L08.9) Local skin infection  (primary encounter diagnosis)  Comment: left forearm  Plan: clindamycin (CLEOCIN) 300 MG capsule            Reviewed skin infection at site of prior IV.  Warm compress to area, tylenol for discomfort.  RX clindamycin.  Reassurance given that exam is not consistent with blood clot in arm, reviewed that may have superficial phlebitis and this usually resolves with warm compress.    Follow up if no improvement of rash after 48 hours on antibiotic.    Carlyle Eaton MD  July 22, 2018 2:11 PM

## 2018-07-22 NOTE — MR AVS SNAPSHOT
After Visit Summary   7/22/2018    Jim Flores    MRN: [id number]           Patient Information     Date Of Birth          1975        Visit Information        Provider Department      7/22/2018 12:50 PM Carlyle Eaton MD [facility]        Today's Diagnoses     Local skin infection    -  1      Care Instructions    Take full course of antibiotic for skin infection.  Warm compress to area.    Okay for tylenol for discomfort.      Cellulitis  Cellulitis is an infection of the deep layers of skin. A break in the skin, such as a cut or scratch, can let bacteria under the skin. If the bacteria get to deep layers of the skin, it can be serious. If not treated, cellulitis can get into the bloodstream and lymph nodes. The infection can then spread throughout the body. This causes serious illness.  Cellulitis causes the affected skin to become red, swollen, warm, and sore. The reddened areas have a visible border. An open sore may leak fluid (pus). You may have a fever, chills, and pain.  Cellulitis is treated with antibiotics taken for 7 to 10 days. An open sore may be cleaned and covered with cool wet gauze. Symptoms should get better 1 to 2 days after treatment is started. Make sure to take all the antibiotics for the full number of days until they are gone. Keep taking the medicine even if your symptoms go away.  Home care  Follow these tips:    Limit the use of the part of your body with cellulitis.     If the infection is on your leg, keep your leg raised while sitting. This will help to reduce swelling.    Take all of the antibiotic medicine exactly as directed until it is gone. Do not miss any doses, especially during the first 7 days. Don t stop taking the medicine when your symptoms get better.    Keep the affected area clean and dry.    Wash your hands with soap and warm water before and after touching your skin. Anyone else who touches your skin should also wash his or her hands.  Don't share towels.  Follow-up care  Follow up with your healthcare provider, or as advised. If your infection does not go away on the first antibiotic, your healthcare provider will prescribe a different one.  When to seek medical advice  Call your healthcare provider right away if any of these occur:    Red areas that spread    Swelling or pain that gets worse    Fluid leaking from the skin (pus)    Fever higher of 100.4  F (38.0  C) or higher after 2 days on antibiotics  Date Last Reviewed: 9/1/2016 2000-2017 [employer]. [address] [place] [address]. All rights reserved. This information is not intended as a substitute for professional medical care. Always follow your healthcare professional's instructions.                Follow-ups after your visit        Who to contact     If you have questions or need follow up information about today's clinic visit or your schedule please contact [facility] directly at [phone].  Normal or non-critical lab and imaging results will be communicated to you by MyChart, letter or phone within 4 business days after the clinic has received the results. If you do not hear from us within 7 days, please contact the clinic through [employer] or phone. If you have a critical or abnormal lab result, we will notify you by phone as soon as possible.  Submit refill requests through [employer] or call your pharmacy and they will forward the refill request to us. Please allow 3 business days for your refill to be completed.          Additional Information About Your Visit        [employer] Information     [employer] gives you secure access to your electronic health record. If you see a primary care provider, you can also send messages to your care team and make appointments. If you have questions, please call your primary care clinic.  If you do not have a primary care provider, please call [phone] and they will assist you.        Care EveryWhere ID      This is your Care EveryWhere ID. This could be used by other organizations to access your [place] medical records  [id number]        Your Vitals Were     Pulse Temperature Pulse Oximetry BMI (Body Mass Index)          95 98.7  F (37.1  C) (Tympanic) 97% 26.73 kg/m2         Blood Pressure from Last 3 Encounters:   07/22/18 138/90   04/20/18 (!) 142/92   01/22/18 116/82    Weight from Last 3 Encounters:   07/22/18 181 lb (82.1 kg)   04/20/18 180 lb 8 oz (81.9 kg)   01/22/18 189 lb (85.7 kg)              Today, you had the following     No orders found for display         Today's Medication Changes          These changes are accurate as of 7/22/18  2:01 PM.  If you have any questions, ask your nurse or doctor.               Start taking these medicines.        Dose/Directions    clindamycin 300 MG capsule   Commonly known as:  CLEOCIN   Used for:  Local skin infection   Started by:  Carlyle Eaton MD        Dose:  300 mg   Take 1 capsule (300 mg) by mouth 4 times daily for 10 days   Quantity:  40 capsule   Refills:  0            Where to get your medicines      These medications were sent to [facility] - [place] - [facility] [facility]  AT [facility] & [address] [place] [address]     Phone:  [phone]     clindamycin 300 MG capsule                Primary Care Provider Office Phone # Fax #    [facility]oney [phone] [address] [place] [address]        Equal Access to Services     [facility]: Hadii saulo farrar hadhaleigho Solata, waaxda luqadaha, qaybta kaalmada eleuterioegyacale, maximiliano idimarkus arredondo. So [facility] [phone].    ATENCIÓN: Si habla español, tiene a dang disposición servicios gratuitos de asistencia lingüística. Llame al [phone].    We comply with applicable federal civil rights laws and [place] laws. We do not discriminate on the basis of race, color, national origin, age, disability, sex, sexual  orientation, or gender identity.            Thank you!     Thank you for choosing [facility]  for your care. Our goal is always to provide you with excellent care. Hearing back from our patients is one way we can continue to improve our services. Please take a few minutes to complete the written survey that you may receive in the mail after your visit with us. Thank you!             Your Updated Medication List - Protect others around you: Learn how to safely use, store and throw away your medicines at www.disposemymeds.org.          This list is accurate as of 7/22/18  2:01 PM.  Always use your most recent med list.                   Brand Name Dispense Instructions for use Diagnosis    azaTHIOprine 50 MG tablet    IMURAN     TK 1 T PO BID        BUSPAR 15 MG tablet   Generic drug:  busPIRone     90 tablet    Take 2 tablets by mouth 2 times daily.        clindamycin 300 MG capsule    CLEOCIN    40 capsule    Take 1 capsule (300 mg) by mouth 4 times daily for 10 days    Local skin infection       desonide 0.05 % cream    DESOWEN     Apply topically three times a week        DOCOSAHEXAENOIC ACID PO      3,000mg daily        Fish Oil 1200 MG Cpdr      Take 2,400 Units by mouth daily        fluticasone 50 MCG/ACT spray    FLONASE    3 Bottle    Spray 1-2 sprays into both nostrils daily as needed for rhinitis or allergies    Chronic rhinitis, unspecified type       hydrochlorothiazide 25 MG tablet    HYDRODIURIL    90 tablet    Take 1 tablet (25 mg) by mouth daily    Calculus of kidney       LORazepam 0.5 MG tablet    ATIVAN    30 tablet    Take 1 tablet by mouth every 6 hours as needed for anxiety.        NONFORMULARY      Take 2 capsules by mouth every morning Methyl Guard Plus        PLAQUENIL 200 MG tablet   Generic drug:  hydroxychloroquine      Take 200 mg by mouth 2 times daily.        predniSONE 5 MG tablet    DELTASONE     Take 1.5 tablets (7.5 mg) by mouth daily        PROBIOTIC DAILY PO       Take 1 capsule by mouth daily        propranolol 10 MG tablet    INDERAL     prn        valACYclovir 1000 mg tablet    VALTREX    21 tablet    Take 1 tablet (1,000 mg) by mouth 3 times daily    Herpes zoster without complication       venlafaxine 150 MG 24 hr capsule    EFFEXOR-XR    30 capsule    Take 1 capsule by mouth 2 times daily.        VITAMIN D (CHOLECALCIFEROL) PO      Take 2,000 Units by mouth 2 times daily        warfarin 10 MG tablet    COUMADIN     TK 1.5 TS D OR AS DIRECTED BASED ON LAB RESULTS

## 2018-07-22 NOTE — PATIENT INSTRUCTIONS
Take full course of antibiotic for skin infection.  Warm compress to area.    Okay for tylenol for discomfort.      Cellulitis  Cellulitis is an infection of the deep layers of skin. A break in the skin, such as a cut or scratch, can let bacteria under the skin. If the bacteria get to deep layers of the skin, it can be serious. If not treated, cellulitis can get into the bloodstream and lymph nodes. The infection can then spread throughout the body. This causes serious illness.  Cellulitis causes the affected skin to become red, swollen, warm, and sore. The reddened areas have a visible border. An open sore may leak fluid (pus). You may have a fever, chills, and pain.  Cellulitis is treated with antibiotics taken for 7 to 10 days. An open sore may be cleaned and covered with cool wet gauze. Symptoms should get better 1 to 2 days after treatment is started. Make sure to take all the antibiotics for the full number of days until they are gone. Keep taking the medicine even if your symptoms go away.  Home care  Follow these tips:    Limit the use of the part of your body with cellulitis.     If the infection is on your leg, keep your leg raised while sitting. This will help to reduce swelling.    Take all of the antibiotic medicine exactly as directed until it is gone. Do not miss any doses, especially during the first 7 days. Don t stop taking the medicine when your symptoms get better.    Keep the affected area clean and dry.    Wash your hands with soap and warm water before and after touching your skin. Anyone else who touches your skin should also wash his or her hands. Don't share towels.  Follow-up care  Follow up with your healthcare provider, or as advised. If your infection does not go away on the first antibiotic, your healthcare provider will prescribe a different one.  When to seek medical advice  Call your healthcare provider right away if any of these occur:    Red areas that spread    Swelling or pain  that gets worse    Fluid leaking from the skin (pus)    Fever higher of 100.4  F (38.0  C) or higher after 2 days on antibiotics  Date Last Reviewed: 9/1/2016 2000-2017 [employer]. [address] [place] [address], [place] [address]. All rights reserved. This information is not intended as a substitute for professional medical care. Always follow your healthcare professional's instructions.

## 2018-07-24 ENCOUNTER — HOSPITAL ENCOUNTER (OUTPATIENT)
Dept: ULTRASOUND IMAGING | Facility: CLINIC | Age: 43
Discharge: HOME | End: 2018-07-24
Payer: COMMERCIAL

## 2018-07-24 DIAGNOSIS — D68.59 HYPERCOAGULATION SYNDROME (H): ICD-10-CM

## 2018-07-24 PROCEDURE — 93971 EXTREMITY STUDY: CPT | Mod: LT

## 2018-09-27 DIAGNOSIS — M32.9 SYSTEMIC LUPUS ERYTHEMATOSUS (H): ICD-10-CM

## 2018-09-27 DIAGNOSIS — Z79.899 ENCOUNTER FOR LONG-TERM (CURRENT) USE OF MEDICATIONS: ICD-10-CM

## 2018-09-27 LAB
ALBUMIN SERPL-MCNC: 3.7 G/DL (ref 3.4–5)
ALT SERPL W P-5'-P-CCNC: 35 U/L (ref 0–70)
AST SERPL W P-5'-P-CCNC: 23 U/L (ref 0–45)
BASOPHILS # BLD AUTO: 0 10E9/L (ref 0–0.2)
BASOPHILS NFR BLD AUTO: 0.4 %
CREAT SERPL-MCNC: 1.12 MG/DL (ref 0.66–1.25)
DIFFERENTIAL METHOD BLD: ABNORMAL
EOSINOPHIL # BLD AUTO: 0.2 10E9/L (ref 0–0.7)
EOSINOPHIL NFR BLD AUTO: 4.4 %
ERYTHROCYTE [DISTWIDTH] IN BLOOD BY AUTOMATED COUNT: 14.8 % (ref 10–15)
GFR SERPL CREATININE-BSD FRML MDRD: 71 ML/MIN/1.7M2
HCT VFR BLD AUTO: 44.1 % (ref 40–53)
HGB BLD-MCNC: 14.6 G/DL (ref 13.3–17.7)
LYMPHOCYTES # BLD AUTO: 0.7 10E9/L (ref 0.8–5.3)
LYMPHOCYTES NFR BLD AUTO: 14.7 %
MCH RBC QN AUTO: 30.2 PG (ref 26.5–33)
MCHC RBC AUTO-ENTMCNC: 33.1 G/DL (ref 31.5–36.5)
MCV RBC AUTO: 91 FL (ref 78–100)
MONOCYTES # BLD AUTO: 0.6 10E9/L (ref 0–1.3)
MONOCYTES NFR BLD AUTO: 12.5 %
NEUTROPHILS # BLD AUTO: 3.4 10E9/L (ref 1.6–8.3)
NEUTROPHILS NFR BLD AUTO: 68 %
PLATELET # BLD AUTO: 199 10E9/L (ref 150–450)
RBC # BLD AUTO: 4.84 10E12/L (ref 4.4–5.9)
WBC # BLD AUTO: 5 10E9/L (ref 4–11)

## 2018-09-27 PROCEDURE — 82040 ASSAY OF SERUM ALBUMIN: CPT | Performed by: INTERNAL MEDICINE

## 2018-09-27 PROCEDURE — 36415 COLL VENOUS BLD VENIPUNCTURE: CPT | Performed by: INTERNAL MEDICINE

## 2018-09-27 PROCEDURE — 82565 ASSAY OF CREATININE: CPT | Performed by: INTERNAL MEDICINE

## 2018-09-27 PROCEDURE — 84460 ALANINE AMINO (ALT) (SGPT): CPT | Performed by: INTERNAL MEDICINE

## 2018-09-27 PROCEDURE — 84450 TRANSFERASE (AST) (SGOT): CPT | Performed by: INTERNAL MEDICINE

## 2018-09-27 PROCEDURE — 85025 COMPLETE CBC W/AUTO DIFF WBC: CPT | Performed by: INTERNAL MEDICINE

## 2018-10-11 ENCOUNTER — TRANSFERRED RECORDS (OUTPATIENT)
Dept: HEALTH INFORMATION MANAGEMENT | Facility: CLINIC | Age: 43
End: 2018-10-11

## 2019-01-03 ENCOUNTER — MEDICAL CORRESPONDENCE (OUTPATIENT)
Dept: HEALTH INFORMATION MANAGEMENT | Facility: CLINIC | Age: 44
End: 2019-01-03

## 2019-01-03 ENCOUNTER — MYC MEDICAL ADVICE (OUTPATIENT)
Dept: PEDIATRICS | Facility: CLINIC | Age: 44
End: 2019-01-03

## 2019-01-03 DIAGNOSIS — E21.3 HYPERPARATHYROIDISM (H): Primary | ICD-10-CM

## 2019-01-04 PROBLEM — E21.3 HYPERPARATHYROIDISM (H): Status: ACTIVE | Noted: 2019-01-04

## 2019-01-04 NOTE — TELEPHONE ENCOUNTER
Patient requesting orders for labs that were advised by [place] after patient had surgery.    Please advise to request, lab orders pended,  Maryanne LEONE RN - Triage  [facility]

## 2019-01-17 ENCOUNTER — APPOINTMENT (OUTPATIENT)
Dept: CT IMAGING | Facility: CLINIC | Age: 44
End: 2019-01-17
Payer: COMMERCIAL

## 2019-01-17 ENCOUNTER — HOSPITAL ENCOUNTER (EMERGENCY)
Facility: CLINIC | Age: 44
Discharge: HOME | End: 2019-01-18
Attending: EMERGENCY MEDICINE | Admitting: EMERGENCY MEDICINE
Payer: COMMERCIAL

## 2019-01-17 DIAGNOSIS — N23 RENAL COLIC: ICD-10-CM

## 2019-01-17 LAB
ANION GAP SERPL CALCULATED.3IONS-SCNC: 9 MMOL/L (ref 3–14)
BASOPHILS # BLD AUTO: 0 10E9/L (ref 0–0.2)
BASOPHILS NFR BLD AUTO: 0.2 %
BUN SERPL-MCNC: 15 MG/DL (ref 7–30)
CALCIUM SERPL-MCNC: 8.5 MG/DL (ref 8.5–10.1)
CHLORIDE SERPL-SCNC: 106 MMOL/L (ref 94–109)
CO2 SERPL-SCNC: 23 MMOL/L (ref 20–32)
CREAT SERPL-MCNC: 1.19 MG/DL (ref 0.66–1.25)
DIFFERENTIAL METHOD BLD: ABNORMAL
EOSINOPHIL # BLD AUTO: 0 10E9/L (ref 0–0.7)
EOSINOPHIL NFR BLD AUTO: 0.1 %
ERYTHROCYTE [DISTWIDTH] IN BLOOD BY AUTOMATED COUNT: 14.4 % (ref 10–15)
GFR SERPL CREATININE-BSD FRML MDRD: 73 ML/MIN/{1.73_M2}
GLUCOSE SERPL-MCNC: 115 MG/DL (ref 70–99)
HCT VFR BLD AUTO: 43.7 % (ref 40–53)
HGB BLD-MCNC: 14.9 G/DL (ref 13.3–17.7)
IMM GRANULOCYTES # BLD: 0.1 10E9/L (ref 0–0.4)
IMM GRANULOCYTES NFR BLD: 0.5 %
INR PPP: 1.32 (ref 0.86–1.14)
LYMPHOCYTES # BLD AUTO: 0.3 10E9/L (ref 0.8–5.3)
LYMPHOCYTES NFR BLD AUTO: 2.8 %
MCH RBC QN AUTO: 29.8 PG (ref 26.5–33)
MCHC RBC AUTO-ENTMCNC: 34.1 G/DL (ref 31.5–36.5)
MCV RBC AUTO: 87 FL (ref 78–100)
MONOCYTES # BLD AUTO: 0.5 10E9/L (ref 0–1.3)
MONOCYTES NFR BLD AUTO: 5.4 %
NEUTROPHILS # BLD AUTO: 8.7 10E9/L (ref 1.6–8.3)
NEUTROPHILS NFR BLD AUTO: 91 %
NRBC # BLD AUTO: 0 10*3/UL
NRBC BLD AUTO-RTO: 0 /100
PLATELET # BLD AUTO: 215 10E9/L (ref 150–450)
POTASSIUM SERPL-SCNC: 4.1 MMOL/L (ref 3.4–5.3)
RBC # BLD AUTO: 5 10E12/L (ref 4.4–5.9)
SODIUM SERPL-SCNC: 138 MMOL/L (ref 133–144)
WBC # BLD AUTO: 9.6 10E9/L (ref 4–11)

## 2019-01-17 PROCEDURE — 99285 EMERGENCY DEPT VISIT HI MDM: CPT | Mod: 25

## 2019-01-17 PROCEDURE — 25000132 ZZH RX MED GY IP 250 OP 250 PS 637: Performed by: EMERGENCY MEDICINE

## 2019-01-17 PROCEDURE — 25000128 H RX IP 250 OP 636: Performed by: EMERGENCY MEDICINE

## 2019-01-17 PROCEDURE — 96374 THER/PROPH/DIAG INJ IV PUSH: CPT

## 2019-01-17 PROCEDURE — 80048 BASIC METABOLIC PNL TOTAL CA: CPT | Performed by: EMERGENCY MEDICINE

## 2019-01-17 PROCEDURE — 96361 HYDRATE IV INFUSION ADD-ON: CPT

## 2019-01-17 PROCEDURE — 85025 COMPLETE CBC W/AUTO DIFF WBC: CPT | Performed by: EMERGENCY MEDICINE

## 2019-01-17 PROCEDURE — 85610 PROTHROMBIN TIME: CPT | Performed by: EMERGENCY MEDICINE

## 2019-01-17 PROCEDURE — 74176 CT ABD & PELVIS W/O CONTRAST: CPT

## 2019-01-17 PROCEDURE — 96375 TX/PRO/DX INJ NEW DRUG ADDON: CPT

## 2019-01-17 RX ORDER — TAMSULOSIN HYDROCHLORIDE 0.4 MG/1
0.4 CAPSULE ORAL ONCE
Status: COMPLETED | OUTPATIENT
Start: 2019-01-17 | End: 2019-01-17

## 2019-01-17 RX ORDER — MORPHINE SULFATE 4 MG/ML
4 INJECTION, SOLUTION INTRAMUSCULAR; INTRAVENOUS ONCE
Status: COMPLETED | OUTPATIENT
Start: 2019-01-17 | End: 2019-01-17

## 2019-01-17 RX ORDER — ONDANSETRON 2 MG/ML
4 INJECTION INTRAMUSCULAR; INTRAVENOUS ONCE
Status: COMPLETED | OUTPATIENT
Start: 2019-01-17 | End: 2019-01-17

## 2019-01-17 RX ORDER — ONDANSETRON 2 MG/ML
4 INJECTION INTRAMUSCULAR; INTRAVENOUS ONCE
Status: DISCONTINUED | OUTPATIENT
Start: 2019-01-17 | End: 2019-01-17

## 2019-01-17 RX ORDER — HYDROMORPHONE HYDROCHLORIDE 1 MG/ML
0.5 INJECTION, SOLUTION INTRAMUSCULAR; INTRAVENOUS; SUBCUTANEOUS
Status: DISCONTINUED | OUTPATIENT
Start: 2019-01-17 | End: 2019-01-18 | Stop reason: HOSPADM

## 2019-01-17 RX ADMIN — SODIUM CHLORIDE 1000 ML: 9 INJECTION, SOLUTION INTRAVENOUS at 21:43

## 2019-01-17 RX ADMIN — ONDANSETRON 4 MG: 2 INJECTION INTRAMUSCULAR; INTRAVENOUS at 21:53

## 2019-01-17 RX ADMIN — MORPHINE SULFATE 4 MG: 4 INJECTION INTRAVENOUS at 21:52

## 2019-01-17 RX ADMIN — Medication 0.5 MG: at 23:29

## 2019-01-17 RX ADMIN — TAMSULOSIN HYDROCHLORIDE 0.4 MG: 0.4 CAPSULE ORAL at 23:29

## 2019-01-17 ASSESSMENT — ENCOUNTER SYMPTOMS
FLANK PAIN: 1
NAUSEA: 1
VOMITING: 0

## 2019-01-17 NOTE — ED AVS SNAPSHOT
[facility]  [address]  [facility] [address]  Phone:  [phone]  Fax:  [phone]                                    Jim Flores   MRN: [id number]    Department:  [facility]   Date of Visit:  1/17/2019           After Visit Summary Signature Page    I have received my discharge instructions, and my questions have been answered. I have discussed any challenges I see with this plan with the nurse or doctor.    ..........................................................................................................................................  Patient/Patient Representative Signature      ..........................................................................................................................................  Patient Representative Print Name and Relationship to Patient    ..................................................               ................................................  Date                                   Time    ..........................................................................................................................................  Reviewed by Signature/Title    ...................................................              ..............................................  Date                                               Time          22EPIC Rev 08/18

## 2019-01-18 VITALS
SYSTOLIC BLOOD PRESSURE: 147 MMHG | OXYGEN SATURATION: 97 % | BODY MASS INDEX: 27.32 KG/M2 | TEMPERATURE: 98 F | WEIGHT: 185 LBS | HEART RATE: 92 BPM | DIASTOLIC BLOOD PRESSURE: 99 MMHG | RESPIRATION RATE: 20 BRPM

## 2019-01-18 LAB
ALBUMIN UR-MCNC: NEGATIVE MG/DL
AMORPH CRY #/AREA URNS HPF: ABNORMAL /HPF
APPEARANCE UR: ABNORMAL
BACTERIA #/AREA URNS HPF: ABNORMAL /HPF
BILIRUB UR QL STRIP: NEGATIVE
COLOR UR AUTO: YELLOW
GLUCOSE UR STRIP-MCNC: NEGATIVE MG/DL
HGB UR QL STRIP: ABNORMAL
KETONES UR STRIP-MCNC: NEGATIVE MG/DL
LEUKOCYTE ESTERASE UR QL STRIP: NEGATIVE
MUCOUS THREADS #/AREA URNS LPF: PRESENT /LPF
NITRATE UR QL: NEGATIVE
PH UR STRIP: 7 PH (ref 5–7)
RBC #/AREA URNS AUTO: 3 /HPF (ref 0–2)
SOURCE: ABNORMAL
SP GR UR STRIP: 1 (ref 1–1.03)
UROBILINOGEN UR STRIP-MCNC: 0 MG/DL (ref 0–2)
WBC #/AREA URNS AUTO: 1 /HPF (ref 0–5)

## 2019-01-18 PROCEDURE — 81001 URINALYSIS AUTO W/SCOPE: CPT | Performed by: EMERGENCY MEDICINE

## 2019-01-18 RX ORDER — ONDANSETRON 4 MG/1
4 TABLET, ORALLY DISINTEGRATING ORAL EVERY 8 HOURS PRN
Qty: 10 TABLET | Refills: 0 | Status: SHIPPED | OUTPATIENT
Start: 2019-01-18 | End: 2019-01-21

## 2019-01-18 RX ORDER — TAMSULOSIN HYDROCHLORIDE 0.4 MG/1
0.4 CAPSULE ORAL DAILY
Qty: 10 CAPSULE | Refills: 0 | Status: SHIPPED | OUTPATIENT
Start: 2019-01-18 | End: 2020-06-02

## 2019-01-18 RX ORDER — OXYCODONE AND ACETAMINOPHEN 5; 325 MG/1; MG/1
1-2 TABLET ORAL EVERY 4 HOURS PRN
Qty: 12 TABLET | Refills: 0 | Status: SHIPPED | OUTPATIENT
Start: 2019-01-18 | End: 2019-01-21

## 2019-01-18 NOTE — ED PROVIDER NOTES
History     Chief Complaint:  Rule out kidney stone    HPI   Jim Flores is a 43 year old male who presents with his wife to the emergency department to rule out kidney stones. He reports that he has had multiple kidney stones in the past, and for month has had left sided flank pain which he states is similar to his previous stones. This pain exacerbated around 7 hours ago, and worsened from there, prompting his evaluation here. He notes some nausea, denies emesis. A CT abdomen/pelvis has already resulted revealing a 7 mm stone at the UPJ. He also has had procedures in the last year to remove bilateral renal stones.   He states he is anticoagulated on coumadin for previous PE.    Allergies:  Ampicillin  Bactrim  Cefuroxime  Ciprofloxacin  Penicillin  Sulfa  Toradol    Medications:    Coumadin  Effexor  Inderal  Valtrex  Deltasone  Inderal  Ativan  Plaquenil  Desowen  Buspar  Imuran  Hydrodiuril    Past Medical History:    Systemic lupus erythematosus with pericarditis  Anxiety  GERD  Hyperparathyroidism  Pulmonary infarct  Nephrolithiasis  Hypercalciuria  Antiphospholipid antibody positive  PE    Past Surgical History:    Lithotripsy  Kidney stone removal  Hernia    Family History:    Cancer  CHF  DM    Social History:  Negative for tobacco use.  Positive for alcohol use.       Review of Systems   Gastrointestinal: Positive for nausea. Negative for vomiting.   Genitourinary: Positive for flank pain.   All other systems reviewed and are negative.      Physical Exam     Patient Vitals for the past 24 hrs:   BP Temp Temp src Pulse Heart Rate Resp SpO2 Weight   01/18/19 0020 -- -- -- -- -- -- 97 % --   01/18/19 0015 -- -- -- 92 -- -- 96 % --   01/18/19 0010 -- -- -- -- -- -- 96 % --   01/18/19 0005 -- -- -- -- -- -- 95 % --   01/18/19 0000 -- -- -- -- -- -- 95 % --   01/17/19 2355 -- -- -- -- -- -- 94 % --   01/17/19 2350 (!) 147/99 -- -- -- -- -- 96 % --   01/17/19 2345 -- -- -- 84 -- -- 95 % --   01/17/19 2340 --  -- -- -- -- -- 95 % --   01/17/19 2335 -- -- -- -- -- -- 94 % --   01/17/19 2330 (!) 151/99 -- -- 87 -- -- 97 % --   01/17/19 2225 -- -- -- -- -- -- 98 % --   01/17/19 2222 (!) 143/94 -- -- 92 -- 20 100 % --   01/17/19 2220 (!) 143/94 -- -- -- -- -- 95 % --   01/17/19 2215 -- -- -- 84 -- -- 96 % --   01/17/19 2210 -- -- -- -- -- -- 95 % --   01/17/19 2205 -- -- -- -- -- -- 95 % --   01/17/19 2200 (!) 137/92 -- -- 87 -- -- 97 % --   01/17/19 2155 -- -- -- -- -- -- 96 % --   01/17/19 2150 (!) 158/103 -- -- -- -- -- 95 % --   01/17/19 2145 -- -- -- 92 -- -- 94 % --   01/17/19 2140 (!) 158/103 -- -- -- -- -- 91 % --   01/17/19 2135 (!) 174/109 -- -- 90 -- -- 96 % --   01/17/19 2130 (!) 159/109 98  F (36.7  C) Temporal 100 100 16 99 % 83.9 kg (185 lb)         Physical Exam  Constitutional:  Oriented to person, place, and time. Well apperaing.  HENT:   Head:    Normocephalic.   Mouth/Throat:   Oropharynx is clear and moist.   Eyes:    EOM are normal. Pupils are equal, round, and reactive to light.   Neck:    Neck supple.   Cardiovascular:  Normal rate, regular rhythm and normal heart sounds.      Exam reveals no gallop and no friction rub.       No murmur heard.  Pulmonary/Chest:  Effort normal and breath sounds normal.      No respiratory distress. No wheezes. No rales.      No reproducible chest wall pain.  Abdominal:   Soft. No distension. Tenderness over left flank  Musculoskeletal:  Normal range of motion.   Neurological:   Alert and oriented to person, place, and time.           Moves all 4 extremities spontaneously    Skin:    No rash noted. No pallor.       Emergency Department Course     Imaging:  Radiographic findings were communicated with the patient who voiced understanding of the findings.    CT Abdomen/Pelvis with IV contrast:   1. Moderate left hydronephrosis due to a 0.7 cm UPJ stone.  2. Multiple bilateral renal stones, more numerous on the left. as per radiology.      Laboratory:    CBC: WBC: 9.6, HGB:  14.9, PLT: 215  BMP: Glucose 115, o/w WNL (Creatinine: 1.19)    INR: 1.32    UA with Microscopic: Blood: small, RBC: 3, Mucous: present, Amorphous crystals: few, o/w WNL    Interventions:  The patient's symptoms were improved with parenteral narcotics.  2143 NS 1L IV  2152 Morphine 4 mg IV  2153 Zofran 4 mg IV  2329 Dilaudid 0.5 mg IV   Flomax 0.4 mg Oral    Medications   HYDROmorphone (PF) (DILAUDID) injection 0.5 mg (0.5 mg Intravenous Given 1/17/19 2329)   0.9% sodium chloride BOLUS (1,000 mLs Intravenous New Bag 1/17/19 2143)   morphine (PF) injection 4 mg (4 mg Intravenous Given 1/17/19 2152)   ondansetron (ZOFRAN) injection 4 mg (4 mg Intravenous Given 1/17/19 2153)   tamsulosin (FLOMAX) capsule 0.4 mg (0.4 mg Oral Given 1/17/19 2329)         Emergency Department Course:  Nursing notes and vitals reviewed. (2304) I performed an exam of the patient as documented above.     IV inserted. Medicine administered as documented above. Blood drawn. This was sent to the lab for further testing, results above.    The patient was sent for a Abd/pel CT while in the emergency department, findings above.     (0041) I rechecked the patient and discussed the results of his workup thus far.     Findings and plan explained to the Patient and spouse. Patient discharged home with instructions regarding supportive care, medications, and reasons to return. The importance of close follow-up was reviewed. The patient was prescribed Percocet, Zofran, Flomax.    I personally reviewed the laboratory results with the Patient and spouse and answered all related questions prior to discharge.       Impression & Plan      Medical Decision Making:  Jim Flores is a 43 year old male who presents with flank pain. A broad differential diagnosis was considered including diverticulitis, ureterolithiasis, tumor, colitis, cholecystitis, aneurysm, dissection, volvulus, appendicitis, splenic issue, stomach pathology, ulcer, hydronephrosis, pneumonia,  rib fracture, UTI, pyelonephritis amongst many other etiologies.   Signs and symptoms consistent with ureterolithiasis.  Patients pain is controlled in ED and given flomax.  No signs of infected stone.  Patient is hemodynamically stable in ED. Plan is home with abdominal pain recheck by primary care physician or return to ED if symptoms worsen.  Return for fevers greater than 102, increasing pain, other new symptoms develop.  Ureterolithiasis precautions for home.  Questions were answered.         Diagnosis:    ICD-10-CM    1. Renal colic N23        Disposition:  discharged to home    Discharge Medications:     Medication List      Started    ondansetron 4 MG ODT tab  Commonly known as:  ZOFRAN ODT  4 mg, Oral, EVERY 8 HOURS PRN     oxyCODONE-acetaminophen 5-325 MG tablet  Commonly known as:  PERCOCET  1-2 tablets, Oral, EVERY 4 HOURS PRN     tamsulosin 0.4 MG capsule  Commonly known as:  FLOMAX  0.4 mg, Oral, DAILY          Scribe Disclosure:  I, Alfred Reyez, am serving as a scribe on 1/17/2019 at 11:08 PM to personally document services performed by Giancarlo Best MD based on my observations and the provider's statements to me.     Alfred Reyez  1/17/2019   [facility] EMERGENCY DEPARTMENT       Giancarlo Best MD  01/18/19 0356

## 2019-01-18 NOTE — DISCHARGE INSTRUCTIONS
Discharge Instructions  Kidney Stones    Kidney stones are a common problem that can cause a lot of pain but fortunately are usually not dangerous and can be generally treated with medicine at home.  However, sometimes your condition may be worse than it seemed at first, or may get worse with time.     You need to follow-up with your regular doctor within 3 days.    Most kidney stones will pass on their own, but occasionally stones may need to be removed by an urologist. We will send you home with a urine strainer. Be sure to urinate into this, or urinate into a container and pour the urine through the fine filter to catch the kidney stone as it comes out. The stone will seem like a pebble or grain of sand. Be sure to save this in a Ziploc  bag and take it to the doctor?s office with you.       Return to the Emergency Department if:  Your pain is not controlled.  You are vomiting and can?t keep fluids or medications down.  You develop fever (>101).  You feel much more ill or develop new symptoms.  What can I do to help myself?  Be sure to drink plenty of fluids.  Staying active is good, and may help the stone to pass. You may do whatever you feel up to doing without restrictions.   Treatment:  Non-steroidal anti-inflammatory drugs (NSAIDs). This includes prescription medicines like Toradol  (ketorolac) and non-prescription medicines like Advil  (ibuprofen) and Nuprin  (ibuprofen). These pain relievers are very effective for kidney stones.  Narcotic pain pills. If you have been given a narcotic such as Vicodin  (hydrocodone with acetaminophen), Percocet  (oxycodone with acetaminophen), or codeine, do not drive for four hours after you have taken it. If the narcotic contains Tylenol  (acetaminophen), do not take Tylenol  with it. All narcotics will cause constipation, so eat a high fiber diet.    Nausea medication.  Nausea and vomiting are common with kidney stones, so your physician may send you home with medicine  for this.   Flomax  (tamsulosin). This medicine is sometimes used for men with prostate problems, but also can help kidney stones to pass. This medicine can lower blood pressure, and you may feel faint, especially when you first stand up. Be sure to get up gradually, sit down if you feel faint, and avoid activity where feeling faint would be dangerous, such as climbing ladders.   If you were given a prescription for medicine here today, be sure to read all of the information (including the package insert) that comes with your prescription.  This will include important information about the medicine, its side effects, and any warnings that you need to know about.  The pharmacist who fills the prescription can provide more information and answer questions you may have about the medicine.  If you have questions or concerns that the pharmacist cannot address, please call or return to the Emergency Department.   Opioid Medication Information    Pain medications are among the most commonly prescribed medicines, so we are including this information for all our patients. If you did not receive pain medication or get a prescription for pain medicine, you can ignore it.     You may have been given a prescription for an opioid (narcotic) pain medicine and/or have received a pain medicine while here in the Emergency Department. These medicines can make you drowsy or impaired. You must not drive, operate dangerous equipment, or engage in any other dangerous activities while taking these medications. If you drive while taking these medications, you could be arrested for DUI, or driving under the influence. Do not drink any alcohol while you are taking these medications.     Opioid pain medications can cause addiction. If you have a history of chemical dependency of any type, you are at a higher risk of becoming addicted to pain medications.  Only take these prescribed medications to treat your pain when all other options have  been tried. Take it for as short a time and as few doses as possible. Store your pain pills in a secure place, as they are frequently stolen and provide a dangerous opportunity for children or visitors in your house to start abusing these powerful medications. We will not replace any lost or stolen medicine.  As soon as your pain is better, you should flush all your remaining medication.     Many prescription pain medications contain Tylenol  (acetaminophen), including Vicodin , Tylenol #3 , Norco , Lortab , and Percocet .  You should not take any extra pills of Tylenol  if you are using these prescription medications or you can get very sick.  Do not ever take more than 3000 mg of acetaminophen in any 24 hour period.    All opioids tend to cause constipation. Drink plenty of water and eat foods that have a lot of fiber, such as fruits, vegetables, prune juice, apple juice and high fiber cereal.  Take a laxative if you don?t move your bowels at least every other day. Miralax , Milk of Magnesia, Colace , or Senna  can be used to keep you regular.      Remember that you can always come back to the Emergency Department if you are not able to see your regular doctor in the amount of time listed above, if you get any new symptoms, or if there is anything that worries you.

## 2019-01-22 DIAGNOSIS — Z79.899 ENCOUNTER FOR LONG-TERM (CURRENT) USE OF MEDICATIONS: ICD-10-CM

## 2019-01-22 DIAGNOSIS — M32.9 SYSTEMIC LUPUS ERYTHEMATOSUS (H): ICD-10-CM

## 2019-01-22 DIAGNOSIS — E21.3 HYPERPARATHYROIDISM (H): ICD-10-CM

## 2019-01-22 LAB
ALBUMIN SERPL-MCNC: 4 G/DL (ref 3.4–5)
ALT SERPL W P-5'-P-CCNC: 37 U/L (ref 0–70)
AST SERPL W P-5'-P-CCNC: 26 U/L (ref 0–45)
BASOPHILS # BLD AUTO: 0 10E9/L (ref 0–0.2)
BASOPHILS NFR BLD AUTO: 0.2 %
CALCIUM SERPL-MCNC: 9.2 MG/DL (ref 8.5–10.1)
CREAT SERPL-MCNC: 1.08 MG/DL (ref 0.66–1.25)
DIFFERENTIAL METHOD BLD: NORMAL
EOSINOPHIL # BLD AUTO: 0.2 10E9/L (ref 0–0.7)
EOSINOPHIL NFR BLD AUTO: 3.9 %
ERYTHROCYTE [DISTWIDTH] IN BLOOD BY AUTOMATED COUNT: 14.9 % (ref 10–15)
GFR SERPL CREATININE-BSD FRML MDRD: 82 ML/MIN/{1.73_M2}
HCT VFR BLD AUTO: 44.6 % (ref 40–53)
HGB BLD-MCNC: 15 G/DL (ref 13.3–17.7)
LYMPHOCYTES # BLD AUTO: 0.8 10E9/L (ref 0.8–5.3)
LYMPHOCYTES NFR BLD AUTO: 17.3 %
MCH RBC QN AUTO: 29.8 PG (ref 26.5–33)
MCHC RBC AUTO-ENTMCNC: 33.6 G/DL (ref 31.5–36.5)
MCV RBC AUTO: 89 FL (ref 78–100)
MONOCYTES # BLD AUTO: 0.5 10E9/L (ref 0–1.3)
MONOCYTES NFR BLD AUTO: 11.1 %
NEUTROPHILS # BLD AUTO: 2.9 10E9/L (ref 1.6–8.3)
NEUTROPHILS NFR BLD AUTO: 67.5 %
PHOSPHATE SERPL-MCNC: 2.9 MG/DL (ref 2.5–4.5)
PLATELET # BLD AUTO: 203 10E9/L (ref 150–450)
PTH-INTACT SERPL-MCNC: 57 PG/ML (ref 18–80)
RBC # BLD AUTO: 5.04 10E12/L (ref 4.4–5.9)
WBC # BLD AUTO: 4.3 10E9/L (ref 4–11)

## 2019-01-22 PROCEDURE — 84460 ALANINE AMINO (ALT) (SGPT): CPT | Performed by: INTERNAL MEDICINE

## 2019-01-22 PROCEDURE — 84450 TRANSFERASE (AST) (SGOT): CPT | Performed by: INTERNAL MEDICINE

## 2019-01-22 PROCEDURE — 84100 ASSAY OF PHOSPHORUS: CPT | Performed by: INTERNAL MEDICINE

## 2019-01-22 PROCEDURE — 83970 ASSAY OF PARATHORMONE: CPT | Performed by: INTERNAL MEDICINE

## 2019-01-22 PROCEDURE — 82310 ASSAY OF CALCIUM: CPT | Performed by: INTERNAL MEDICINE

## 2019-01-22 PROCEDURE — 85025 COMPLETE CBC W/AUTO DIFF WBC: CPT | Performed by: INTERNAL MEDICINE

## 2019-01-22 PROCEDURE — 36415 COLL VENOUS BLD VENIPUNCTURE: CPT | Performed by: INTERNAL MEDICINE

## 2019-01-22 PROCEDURE — 82565 ASSAY OF CREATININE: CPT | Performed by: INTERNAL MEDICINE

## 2019-01-22 PROCEDURE — 82040 ASSAY OF SERUM ALBUMIN: CPT | Performed by: INTERNAL MEDICINE

## 2019-03-10 ENCOUNTER — NURSE TRIAGE (OUTPATIENT)
Dept: NURSING | Facility: CLINIC | Age: 44
End: 2019-03-10

## 2019-03-10 DIAGNOSIS — Z20.828 EXPOSURE TO INFLUENZA: Primary | ICD-10-CM

## 2019-03-10 RX ORDER — OSELTAMIVIR PHOSPHATE 75 MG/1
75 CAPSULE ORAL DAILY
Qty: 10 CAPSULE | Refills: 0 | Status: SHIPPED | OUTPATIENT
Start: 2019-03-10 | End: 2019-03-20

## 2019-03-10 NOTE — TELEPHONE ENCOUNTER
Patient's daughter was in urgent care today and was diagnosed with Influenza A.    The Urgent Care provider recommend that he get an antiviral as he is on immunosuppressants (prednisone and azathioprine) for lupus    Patient is asymptomatic.    PCP is Dr. Good Mooney.  Dr. Zabala is on call and paged via Smart web at 2:45 PM.  Dr. Zabala calls back at 2:56 PM and orders Tamiflu:    oseltamivir (TAMIFLU) 75 MG capsule 10 capsule 0 3/10/2019 3/20/2019 --   Sig - Route: Take 1 capsule (75 mg) by mouth daily for 10 days - Oral   Sent to pharmacy as: oseltamivir (TAMIFLU) 75 MG capsule   Class: E-Prescribe     Phoned patient back and provided this information.    Protocol and care advice reviewed  Caller states understanding of the recommended disposition  Advised to call back if further questions or concerns      Reason for Disposition    [1] Influenza EXPOSURE (Close Contact) within last 72 hours (3 days) AND [2] exposed person is HIGH RISK (e.g., age > 64 years, pregnant, HIV+, chronic medical condition)    Additional Information    Negative: Patient sounds very sick or weak to the triager    Negative: Fever present > 3 days (72 hours)    Protocols used: INFLUENZA EXPOSURE-ADULT-

## 2019-04-12 ENCOUNTER — MEDICAL CORRESPONDENCE (OUTPATIENT)
Dept: HEALTH INFORMATION MANAGEMENT | Facility: CLINIC | Age: 44
End: 2019-04-12

## 2019-04-12 ENCOUNTER — TRANSFERRED RECORDS (OUTPATIENT)
Dept: HEALTH INFORMATION MANAGEMENT | Facility: CLINIC | Age: 44
End: 2019-04-12

## 2019-05-18 DIAGNOSIS — J31.0 CHRONIC RHINITIS: ICD-10-CM

## 2019-05-20 ENCOUNTER — MYC MEDICAL ADVICE (OUTPATIENT)
Dept: PEDIATRICS | Facility: CLINIC | Age: 44
End: 2019-05-20

## 2019-05-21 RX ORDER — FLUTICASONE PROPIONATE 50 MCG
1-2 SPRAY, SUSPENSION (ML) NASAL DAILY
Qty: 16 G | Refills: 0 | Status: SHIPPED | OUTPATIENT
Start: 2019-05-21 | End: 2019-07-13

## 2019-07-13 DIAGNOSIS — J31.0 CHRONIC RHINITIS: ICD-10-CM

## 2019-07-13 RX ORDER — FLUTICASONE PROPIONATE 50 MCG
1-2 SPRAY, SUSPENSION (ML) NASAL DAILY
Qty: 16 ML | Refills: 1 | Status: SHIPPED | OUTPATIENT
Start: 2019-07-13 | End: 2020-07-27

## 2019-07-13 NOTE — TELEPHONE ENCOUNTER
"Requested Prescriptions   Pending Prescriptions Disp Refills     fluticasone (FLONASE) 50 MCG/ACT nasal spray [Pharmacy Med Name: FLUTICASONE 50MCG NASAL SP (120) RX]  Last Written Prescription Date:  05/21/2019  Last Fill Quantity: 16 g,  # refills: 0   Last Office Visit: 4/20/2018 Emy Castellano APRN CNP  Future Office Visit:    Next 5 appointments (look out 90 days)    Jul 19, 2019  3:25 PM CDT  Adult Preventative Visit with Good Mooney MD  [facility] [address] [place] [address]  [address]  [facility] [address] [phone]          16 mL 0     Sig: SPRAY 1-2 SPRAYS INTO BOTH NOSTRIL DAILY. DUE FOR APPT MAY 2019       Inhaled Steroids Protocol Passed - 7/13/2019  3:55 PM        Passed - Patient is age 12 or older        Passed - Recent (12 mo) or future (30 days) visit within the authorizing provider's specialty     Patient had office visit in the last 12 months or has a visit in the next 30 days with authorizing provider or within the authorizing provider's specialty.  See \"Patient Info\" tab in inbasket, or \"Choose Columns\" in Meds & Orders section of the refill encounter.              Passed - Medication is active on med list          "

## 2019-07-19 ENCOUNTER — OFFICE VISIT (OUTPATIENT)
Dept: PEDIATRICS | Facility: CLINIC | Age: 44
End: 2019-07-19
Payer: COMMERCIAL

## 2019-07-19 VITALS
TEMPERATURE: 97.5 F | HEIGHT: 69 IN | HEART RATE: 86 BPM | SYSTOLIC BLOOD PRESSURE: 132 MMHG | BODY MASS INDEX: 25.92 KG/M2 | WEIGHT: 175 LBS | OXYGEN SATURATION: 97 % | DIASTOLIC BLOOD PRESSURE: 88 MMHG

## 2019-07-19 DIAGNOSIS — Z23 NEED FOR VACCINATION: ICD-10-CM

## 2019-07-19 DIAGNOSIS — Z00.00 ROUTINE GENERAL MEDICAL EXAMINATION AT A HEALTH CARE FACILITY: Primary | ICD-10-CM

## 2019-07-19 DIAGNOSIS — M32.12 SYSTEMIC LUPUS ERYTHEMATOSUS (SLE) WITH PERICARDITIS, UNSPECIFIED SLE TYPE (H): ICD-10-CM

## 2019-07-19 DIAGNOSIS — E21.3 HYPERPARATHYROIDISM (H): ICD-10-CM

## 2019-07-19 LAB
ERYTHROCYTE [DISTWIDTH] IN BLOOD BY AUTOMATED COUNT: 14.9 % (ref 10–15)
HCT VFR BLD AUTO: 47.3 % (ref 40–53)
HGB BLD-MCNC: 16.2 G/DL (ref 13.3–17.7)
MCH RBC QN AUTO: 30.6 PG (ref 26.5–33)
MCHC RBC AUTO-ENTMCNC: 34.2 G/DL (ref 31.5–36.5)
MCV RBC AUTO: 89 FL (ref 78–100)
PLATELET # BLD AUTO: 176 10E9/L (ref 150–450)
RBC # BLD AUTO: 5.29 10E12/L (ref 4.4–5.9)
WBC # BLD AUTO: 3.8 10E9/L (ref 4–11)

## 2019-07-19 PROCEDURE — 85027 COMPLETE CBC AUTOMATED: CPT | Performed by: INTERNAL MEDICINE

## 2019-07-19 PROCEDURE — 80061 LIPID PANEL: CPT | Performed by: INTERNAL MEDICINE

## 2019-07-19 PROCEDURE — 83970 ASSAY OF PARATHORMONE: CPT | Performed by: INTERNAL MEDICINE

## 2019-07-19 PROCEDURE — 90714 TD VACC NO PRESV 7 YRS+ IM: CPT | Performed by: INTERNAL MEDICINE

## 2019-07-19 PROCEDURE — 99396 PREV VISIT EST AGE 40-64: CPT | Mod: 25 | Performed by: INTERNAL MEDICINE

## 2019-07-19 PROCEDURE — 80053 COMPREHEN METABOLIC PANEL: CPT | Performed by: INTERNAL MEDICINE

## 2019-07-19 PROCEDURE — 83090 ASSAY OF HOMOCYSTEINE: CPT | Performed by: INTERNAL MEDICINE

## 2019-07-19 PROCEDURE — 82306 VITAMIN D 25 HYDROXY: CPT | Performed by: INTERNAL MEDICINE

## 2019-07-19 PROCEDURE — 84100 ASSAY OF PHOSPHORUS: CPT | Performed by: INTERNAL MEDICINE

## 2019-07-19 PROCEDURE — 36415 COLL VENOUS BLD VENIPUNCTURE: CPT | Performed by: INTERNAL MEDICINE

## 2019-07-19 PROCEDURE — 90471 IMMUNIZATION ADMIN: CPT | Performed by: INTERNAL MEDICINE

## 2019-07-19 SDOH — HEALTH STABILITY: PHYSICAL HEALTH: ON AVERAGE, HOW MANY DAYS PER WEEK DO YOU ENGAGE IN MODERATE TO STRENUOUS EXERCISE (LIKE A BRISK WALK)?: 1 DAY

## 2019-07-19 SDOH — SOCIAL STABILITY: SOCIAL NETWORK: IN A TYPICAL WEEK, HOW MANY TIMES DO YOU TALK ON THE PHONE WITH FAMILY, FRIENDS, OR NEIGHBORS?: TWICE A WEEK

## 2019-07-19 SDOH — HEALTH STABILITY: MENTAL HEALTH: HOW OFTEN DO YOU HAVE A DRINK CONTAINING ALCOHOL?: 4 OR MORE TIMES A WEEK

## 2019-07-19 SDOH — ECONOMIC STABILITY: FOOD INSECURITY: WITHIN THE PAST 12 MONTHS, THE FOOD YOU BOUGHT JUST DIDN'T LAST AND YOU DIDN'T HAVE MONEY TO GET MORE.: NEVER TRUE

## 2019-07-19 SDOH — SOCIAL STABILITY: SOCIAL NETWORK: HOW OFTEN DO YOU GET TOGETHER WITH FRIENDS OR RELATIVES?: ONCE A WEEK

## 2019-07-19 SDOH — ECONOMIC STABILITY: FOOD INSECURITY: WITHIN THE PAST 12 MONTHS, YOU WORRIED THAT YOUR FOOD WOULD RUN OUT BEFORE YOU GOT MONEY TO BUY MORE.: NEVER TRUE

## 2019-07-19 SDOH — ECONOMIC STABILITY: TRANSPORTATION INSECURITY
IN THE PAST 12 MONTHS, HAS THE LACK OF TRANSPORTATION KEPT YOU FROM MEDICAL APPOINTMENTS OR FROM GETTING MEDICATIONS?: NO

## 2019-07-19 SDOH — HEALTH STABILITY: MENTAL HEALTH: HOW MANY STANDARD DRINKS CONTAINING ALCOHOL DO YOU HAVE ON A TYPICAL DAY?: 1 OR 2

## 2019-07-19 SDOH — SOCIAL STABILITY: SOCIAL NETWORK: HOW OFTEN DO YOU ATTEND CHURCH OR RELIGIOUS SERVICES?: NEVER

## 2019-07-19 SDOH — SOCIAL STABILITY: SOCIAL NETWORK: HOW OFTEN DO YOU ATTENT MEETINGS OF THE CLUB OR ORGANIZATION YOU BELONG TO?: NEVER

## 2019-07-19 SDOH — HEALTH STABILITY: MENTAL HEALTH
STRESS IS WHEN SOMEONE FEELS TENSE, NERVOUS, ANXIOUS, OR CAN'T SLEEP AT NIGHT BECAUSE THEIR MIND IS TROUBLED. HOW STRESSED ARE YOU?: ONLY A LITTLE

## 2019-07-19 SDOH — SOCIAL STABILITY: SOCIAL NETWORK: ARE YOU MARRIED, WIDOWED, DIVORCED, SEPARATED, NEVER MARRIED, OR LIVING WITH A PARTNER?: MARRIED

## 2019-07-19 SDOH — ECONOMIC STABILITY: INCOME INSECURITY: HOW HARD IS IT FOR YOU TO PAY FOR THE VERY BASICS LIKE FOOD, HOUSING, MEDICAL CARE, AND HEATING?: NOT HARD AT ALL

## 2019-07-19 SDOH — HEALTH STABILITY: PHYSICAL HEALTH: ON AVERAGE, HOW MANY MINUTES DO YOU ENGAGE IN EXERCISE AT THIS LEVEL?: 30 MIN

## 2019-07-19 SDOH — ECONOMIC STABILITY: TRANSPORTATION INSECURITY
IN THE PAST 12 MONTHS, HAS LACK OF TRANSPORTATION KEPT YOU FROM MEETINGS, WORK, OR FROM GETTING THINGS NEEDED FOR DAILY LIVING?: NO

## 2019-07-19 SDOH — SOCIAL STABILITY: SOCIAL NETWORK
DO YOU BELONG TO ANY CLUBS OR ORGANIZATIONS SUCH AS CHURCH GROUPS UNIONS, FRATERNAL OR ATHLETIC GROUPS, OR SCHOOL GROUPS?: NO

## 2019-07-19 SDOH — HEALTH STABILITY: MENTAL HEALTH: HOW OFTEN DO YOU HAVE 6 OR MORE DRINKS ON ONE OCCASION?: NEVER

## 2019-07-19 ASSESSMENT — ENCOUNTER SYMPTOMS
NERVOUS/ANXIOUS: 1
HEMATURIA: 1
HEARTBURN: 1
HEMATOCHEZIA: 1

## 2019-07-19 ASSESSMENT — MIFFLIN-ST. JEOR: SCORE: 1662.55

## 2019-07-19 NOTE — PROGRESS NOTES
SUBJECTIVE:   CC: Jim Flores is an 44 year old male who presents for preventative health visit.     Healthy Habits:     Getting at least 3 servings of Calcium per day:  NO    Bi-annual eye exam:  Yes    Dental care twice a year:  NO    Sleep apnea or symptoms of sleep apnea:  None    Diet:  Gluten-free/reduced, Breakfast skipped and Other    Frequency of exercise:  None    Taking medications regularly:  Yes    Medication side effects:  None    PHQ-2 Total Score: 0    Additional concerns today:  Yes    Concerns about blood pressure  Hx of elevated BP.  Has been checked at his  office as well as at home.  Typically 130's / upper 80's  Has started working on weight loss.  Exercise - not often  No cardiac sx such as CP, palpitations, PND, orthopnea, GRAMAJO or peripheral edema.     Hx of recurrent renal stones. Followed by urology. Occasional hematuria.    SLE. Hx of recurrent PE's. Followed by rheumatology and hematology.     Anxiety. Managed by psychiatry.    Today's PHQ-2 Score:   PHQ-2 ( 1999 Pf[employer]) 7/19/2019   Q1: Little interest or pleasure in doing things 0   Q2: Feeling down, depressed or hopeless 0   PHQ-2 Score 0   Q1: Little interest or pleasure in doing things Not at all   Q2: Feeling down, depressed or hopeless Not at all   PHQ-2 Score 0       Abuse: Current or Past(Physical, Sexual or Emotional)- No  Do you feel safe in your environment? Yes    Social History     Tobacco Use     Smoking status: Never Smoker     Smokeless tobacco: Never Used   Substance Use Topics     Alcohol use: Yes     Alcohol/week: 0.0 oz     Frequency: 4 or more times a week     Drinks per session: 1 or 2     Binge frequency: Never     Comment: three per week     If you drink alcohol do you typically have >3 drinks per day or >7 drinks per week? No    Alcohol Use 7/19/2019   Prescreen: >3 drinks/day or >7 drinks/week? No   Prescreen: >3 drinks/day or >7 drinks/week? -     Last PSA:   PSA   Date Value Ref Range Status   08/23/2008 0.75  "0 - 4 ug/L Final       Reviewed orders with patient. Reviewed health maintenance and updated orders accordingly - Yes    Reviewed and updated as needed this visit by Provider  Tobacco  Allergies  Meds  Problems  Med Hx  Surg Hx  Fam Hx          Review of Systems   Gastrointestinal: Positive for heartburn and hematochezia.   Genitourinary: Positive for hematuria.   Psychiatric/Behavioral: The patient is nervous/anxious.    CONSTITUTIONAL: NEGATIVE for fever, chills, change in weight  INTEGUMENTARY/SKIN: NEGATIVE for worrisome rashes, moles or lesions  EYES: NEGATIVE for vision changes or irritation  ENT: NEGATIVE for ear, mouth and throat problems  RESP: NEGATIVE for significant cough or SOB  CV: NEGATIVE for chest pain, palpitations or peripheral edema  MUSCULOSKELETAL: NEGATIVE for significant arthralgias or myalgia  NEURO: NEGATIVE for weakness, dizziness or paresthesias    OBJECTIVE:   /88   Pulse 86   Temp 97.5  F (36.4  C) (Tympanic)   Ht 1.75 m (5' 8.9\")   Wt 79.4 kg (175 lb)   SpO2 97%   BMI 25.92 kg/m      Physical Exam  GENERAL: healthy, alert and no distress  EYES: Eyes grossly normal to inspection, PERRL and conjunctivae and sclerae normal  HENT: ear canals and TM's normal, nose and mouth without ulcers or lesions  NECK: no adenopathy, no asymmetry, masses, or scars and thyroid normal to palpation  RESP: lungs clear to auscultation - no rales, rhonchi or wheezes  CV: regular rate and rhythm (one early systole heard), normal S1 S2, no S3 or S4, no murmur, click or rub, no peripheral edema and peripheral pulses strong  ABDOMEN: soft, nontender, no masses and bowel sounds normal  MS: no gross musculoskeletal defects noted, no edema  SKIN: no suspicious lesions or rashes  NEURO: Normal strength and tone, mentation intact and speech normal  PSYCH: mentation appears normal, affect normal/bright      ASSESSMENT/PLAN:       ICD-10-CM    1. Routine general medical examination at a [facility] " "facility Z00.00 CBC with platelets     Comprehensive metabolic panel   2. Systemic lupus erythematosus (SLE) with pericarditis, unspecified SLE type (H) M32.12    3. Hyperparathyroidism (H) E21.3 Phosphorus     Parathyroid Hormone Intact     Vitamin D Deficiency     Homocysteine     Comprehensive metabolic panel   4. Need for vaccination Z23 TD PRESERV FREE, IM (7+ YRS)       COUNSELING:   Reviewed preventive health counseling, as reflected in patient instructions    Estimated body mass index is 25.92 kg/m  as calculated from the following:    Height as of this encounter: 1.75 m (5' 8.9\").    Weight as of this encounter: 79.4 kg (175 lb).     Weight management plan: Discussed healthy diet and exercise guidelines     reports that he has never smoked. He has never used smokeless tobacco.      Counseling Resources:  ATP IV Guidelines  Pooled Cohorts Equation Calculator  FRAX Risk Assessment  ICSI Preventive Guidelines  Dietary Guidelines for Americans, 2010  USDA's MyPlate  ASA Prophylaxis  Lung CA Screening    Good Mooney MD  [facility]  "

## 2019-07-19 NOTE — PATIENT INSTRUCTIONS
Preventive Health Recommendations    Yearly exam:             See your health care provider every year in order to  o   Review health changes.   o   Discuss preventive care.    o   Review your medicines.    Have a cholesterol test every 1-2 years.     Have a colonoscopy (test for colon cancer) if someone in your family has had colon cancer or polyps before age 50.     Shots: Get a flu shot each year. Get a tetanus shot every 10 years.     Nutrition:    Eat at least 5 servings of fruits and vegetables daily.     Eat whole-grain bread, whole-wheat pasta and brown rice instead of white grains and rice.     Get adequate Calcium and Vitamin D.     Lifestyle    Exercise for at least 150 minutes a week (30 minutes a day, 5 days a week). This will help you control your weight and prevent disease.     Limit alcohol to one drink per day.     No smoking.     Wear sunscreen to prevent skin cancer.     See your dentist every six months for an exam and cleaning.

## 2019-07-19 NOTE — PROGRESS NOTES
Medical Assistant Note:  Jim Flores presents today for labs.    Patient seen by provider today: Yes.   present during visit today: Not Applicable.    Concerns: No Concerns.    Procedure:  Lab draw site: LAC, Needle type: butterfly, Gauge: 23.    Post Assessment:  Labs drawn without difficulty: Yes.    Discharge Plan:  Pt tolerated procedure well. Gauze and coban applied as requested. Walked blood to the lab. Pt has been fasting.     Face to Face Time: 8min.    Jo Ann Small MA

## 2019-07-20 LAB
ALBUMIN SERPL-MCNC: 4.3 G/DL (ref 3.4–5)
ALP SERPL-CCNC: 61 U/L (ref 40–150)
ALT SERPL W P-5'-P-CCNC: 43 U/L (ref 0–70)
ANION GAP SERPL CALCULATED.3IONS-SCNC: 6 MMOL/L (ref 3–14)
AST SERPL W P-5'-P-CCNC: 29 U/L (ref 0–45)
BILIRUB SERPL-MCNC: 0.5 MG/DL (ref 0.2–1.3)
BUN SERPL-MCNC: 11 MG/DL (ref 7–30)
CALCIUM SERPL-MCNC: 8.7 MG/DL (ref 8.5–10.1)
CHLORIDE SERPL-SCNC: 106 MMOL/L (ref 94–109)
CHOLEST SERPL-MCNC: 237 MG/DL
CO2 SERPL-SCNC: 26 MMOL/L (ref 20–32)
CREAT SERPL-MCNC: 0.93 MG/DL (ref 0.66–1.25)
GFR SERPL CREATININE-BSD FRML MDRD: >90 ML/MIN/{1.73_M2}
GLUCOSE SERPL-MCNC: 71 MG/DL (ref 70–99)
HDLC SERPL-MCNC: 54 MG/DL
LDLC SERPL CALC-MCNC: 161 MG/DL
NONHDLC SERPL-MCNC: 183 MG/DL
PHOSPHATE SERPL-MCNC: 2.2 MG/DL (ref 2.5–4.5)
POTASSIUM SERPL-SCNC: 3.7 MMOL/L (ref 3.4–5.3)
PROT SERPL-MCNC: 7.1 G/DL (ref 6.8–8.8)
PTH-INTACT SERPL-MCNC: 76 PG/ML (ref 18–80)
SODIUM SERPL-SCNC: 138 MMOL/L (ref 133–144)
TRIGL SERPL-MCNC: 108 MG/DL

## 2019-07-22 LAB — DEPRECATED CALCIDIOL+CALCIFEROL SERPL-MC: 58 UG/L (ref 20–75)

## 2019-07-23 LAB — HCYS SERPL-SCNC: 12.2 UMOL/L (ref 4–12)

## 2019-10-13 ENCOUNTER — MYC MEDICAL ADVICE (OUTPATIENT)
Dept: PEDIATRICS | Facility: CLINIC | Age: 44
End: 2019-10-13

## 2019-10-14 NOTE — TELEPHONE ENCOUNTER
Faxed the results requested to Dr. Samir Mo at the [facility]  Fax number is [phone]     Stretchhart message sent stating this was done.    Félix Mcmahon RN    Triage

## 2019-10-14 NOTE — TELEPHONE ENCOUNTER
Fax the request labs to [phone]  Pt notified via [employer] this was done.    Félix Mcmahon RN    Triage

## 2019-10-15 ENCOUNTER — MYC MEDICAL ADVICE (OUTPATIENT)
Dept: PEDIATRICS | Facility: CLINIC | Age: 44
End: 2019-10-15

## 2019-10-15 NOTE — LETTER
[address] [address]                  [phone]   October 15, 2019      Dr Samir Mo,    Enclosed are Jim Flores's (: ) test results, per request of the patient (10/15/19).      Best regards,    Serafin Trujillo RN        Results for orders placed or performed in visit on 19   Phosphorus   Result Value Ref Range    Phosphorus 2.2 (L) 2.5 - 4.5 mg/dL   Parathyroid Hormone Intact   Result Value Ref Range    Parathyroid Hormone Intact 76 18 - 80 pg/mL   Vitamin D Deficiency   Result Value Ref Range    Vitamin D Deficiency screening 58 20 - 75 ug/L   Homocysteine   Result Value Ref Range    Homocysteine umol/L 12.2 (H) 4.0 - 12.0 umol/L   CBC with platelets   Result Value Ref Range    WBC 3.8 (L) 4.0 - 11.0 10e9/L    RBC Count 5.29 4.4 - 5.9 10e12/L    Hemoglobin 16.2 13.3 - 17.7 g/dL    Hematocrit 47.3 40.0 - 53.0 %    MCV 89 78 - 100 fl    MCH 30.6 26.5 - 33.0 pg    MCHC 34.2 31.5 - 36.5 g/dL    RDW 14.9 10.0 - 15.0 %    Platelet Count 176 150 - 450 10e9/L   Comprehensive metabolic panel   Result Value Ref Range    Sodium 138 133 - 144 mmol/L    Potassium 3.7 3.4 - 5.3 mmol/L    Chloride 106 94 - 109 mmol/L    Carbon Dioxide 26 20 - 32 mmol/L    Anion Gap 6 3 - 14 mmol/L    Glucose 71 70 - 99 mg/dL    Urea Nitrogen 11 7 - 30 mg/dL    Creatinine 0.93 0.66 - 1.25 mg/dL    GFR Estimate >90 >60 mL/min/[1.73_m2]    GFR Estimate If Black >90 >60 mL/min/[1.73_m2]    Calcium 8.7 8.5 - 10.1 mg/dL    Bilirubin Total 0.5 0.2 - 1.3 mg/dL    Albumin 4.3 3.4 - 5.0 g/dL    Protein Total 7.1 6.8 - 8.8 g/dL    Alkaline Phosphatase 61 40 - 150 U/L    ALT 43 0 - 70 U/L    AST 29 0 - 45 U/L   Lipid Profile (Chol, Trig, HDL, LDL calc)   Result Value Ref Range    Cholesterol 237 (H) <200 mg/dL    Triglycerides 108 <150 mg/dL    HDL Cholesterol 54 >39 mg/dL    LDL Cholesterol Calculated 161 (H) <100 mg/dL    Non HDL Cholesterol 183  (H) <130 mg/dL

## 2019-12-12 ENCOUNTER — MYC MEDICAL ADVICE (OUTPATIENT)
Dept: PEDIATRICS | Facility: CLINIC | Age: 44
End: 2019-12-12

## 2019-12-12 DIAGNOSIS — E21.3 HYPERPARATHYROIDISM (H): Primary | ICD-10-CM

## 2019-12-13 NOTE — TELEPHONE ENCOUNTER
Please review the MC message from pt & advise. LOV was on 7/19/19. Thanks.    Juan A CAMEJO, RN  Triage Nurse

## 2019-12-18 DIAGNOSIS — E21.3 HYPERPARATHYROIDISM (H): ICD-10-CM

## 2019-12-18 DIAGNOSIS — Z79.899 ENCOUNTER FOR LONG-TERM (CURRENT) USE OF MEDICATIONS: ICD-10-CM

## 2019-12-18 DIAGNOSIS — M32.9 SYSTEMIC LUPUS ERYTHEMATOSUS (H): ICD-10-CM

## 2019-12-18 LAB
BASOPHILS # BLD AUTO: 0 10E9/L (ref 0–0.2)
BASOPHILS NFR BLD AUTO: 0.3 %
DIFFERENTIAL METHOD BLD: ABNORMAL
EOSINOPHIL # BLD AUTO: 0.2 10E9/L (ref 0–0.7)
EOSINOPHIL NFR BLD AUTO: 4.1 %
ERYTHROCYTE [DISTWIDTH] IN BLOOD BY AUTOMATED COUNT: 14.3 % (ref 10–15)
HCT VFR BLD AUTO: 44.3 % (ref 40–53)
HGB BLD-MCNC: 15.3 G/DL (ref 13.3–17.7)
LYMPHOCYTES # BLD AUTO: 0.5 10E9/L (ref 0.8–5.3)
LYMPHOCYTES NFR BLD AUTO: 11.7 %
MCH RBC QN AUTO: 31.7 PG (ref 26.5–33)
MCHC RBC AUTO-ENTMCNC: 34.5 G/DL (ref 31.5–36.5)
MCV RBC AUTO: 92 FL (ref 78–100)
MONOCYTES # BLD AUTO: 0.5 10E9/L (ref 0–1.3)
MONOCYTES NFR BLD AUTO: 11.7 %
NEUTROPHILS # BLD AUTO: 2.8 10E9/L (ref 1.6–8.3)
NEUTROPHILS NFR BLD AUTO: 72.2 %
PLATELET # BLD AUTO: 194 10E9/L (ref 150–450)
PTH-INTACT SERPL-MCNC: 62 PG/ML (ref 18–80)
RBC # BLD AUTO: 4.82 10E12/L (ref 4.4–5.9)
WBC # BLD AUTO: 3.9 10E9/L (ref 4–11)

## 2019-12-18 PROCEDURE — 84450 TRANSFERASE (AST) (SGOT): CPT | Performed by: INTERNAL MEDICINE

## 2019-12-18 PROCEDURE — 36415 COLL VENOUS BLD VENIPUNCTURE: CPT | Performed by: INTERNAL MEDICINE

## 2019-12-18 PROCEDURE — 82040 ASSAY OF SERUM ALBUMIN: CPT | Performed by: INTERNAL MEDICINE

## 2019-12-18 PROCEDURE — 82565 ASSAY OF CREATININE: CPT | Performed by: INTERNAL MEDICINE

## 2019-12-18 PROCEDURE — 85025 COMPLETE CBC W/AUTO DIFF WBC: CPT | Performed by: INTERNAL MEDICINE

## 2019-12-18 PROCEDURE — 84100 ASSAY OF PHOSPHORUS: CPT | Performed by: INTERNAL MEDICINE

## 2019-12-18 PROCEDURE — 82310 ASSAY OF CALCIUM: CPT | Performed by: INTERNAL MEDICINE

## 2019-12-18 PROCEDURE — 84460 ALANINE AMINO (ALT) (SGPT): CPT | Performed by: INTERNAL MEDICINE

## 2019-12-18 PROCEDURE — 83970 ASSAY OF PARATHORMONE: CPT | Performed by: INTERNAL MEDICINE

## 2019-12-18 PROCEDURE — 83090 ASSAY OF HOMOCYSTEINE: CPT | Performed by: INTERNAL MEDICINE

## 2019-12-19 ENCOUNTER — TRANSFERRED RECORDS (OUTPATIENT)
Dept: HEALTH INFORMATION MANAGEMENT | Facility: CLINIC | Age: 44
End: 2019-12-19

## 2019-12-19 LAB
ALBUMIN SERPL-MCNC: 3.9 G/DL (ref 3.4–5)
ALT SERPL W P-5'-P-CCNC: 31 U/L (ref 0–70)
AST SERPL W P-5'-P-CCNC: 23 U/L (ref 0–45)
CALCIUM SERPL-MCNC: 9.2 MG/DL (ref 8.5–10.1)
CREAT SERPL-MCNC: 1.28 MG/DL (ref 0.66–1.25)
GFR SERPL CREATININE-BSD FRML MDRD: 66 ML/MIN/{1.73_M2}
HCYS SERPL-SCNC: 9.8 UMOL/L (ref 4–12)
PHOSPHATE SERPL-MCNC: 2.7 MG/DL (ref 2.5–4.5)

## 2020-03-09 ENCOUNTER — MYC MEDICAL ADVICE (OUTPATIENT)
Dept: PEDIATRICS | Facility: CLINIC | Age: 45
End: 2020-03-09

## 2020-04-08 ENCOUNTER — TRANSFERRED RECORDS (OUTPATIENT)
Dept: HEALTH INFORMATION MANAGEMENT | Facility: CLINIC | Age: 45
End: 2020-04-08

## 2020-04-08 LAB
ALT SERPL-CCNC: 35 IU/L (ref 5–40)
AST SERPL-CCNC: 30 U/L (ref 5–34)
CREAT SERPL-MCNC: 1.02 MG/DL (ref 0.5–1.3)
GFR SERPL CREATININE-BSD FRML MDRD: 83.6 ML/MIN/1.73M2
POTASSIUM SERPL-SCNC: 4.5 MMOL/L (ref 3.5–5.3)

## 2020-05-27 ENCOUNTER — MYC MEDICAL ADVICE (OUTPATIENT)
Dept: PEDIATRICS | Facility: CLINIC | Age: 45
End: 2020-05-27

## 2020-05-27 NOTE — TELEPHONE ENCOUNTER
Scheduled a video visit with  on 6/2. Sent instruction through .    Also, advised pt to notify us with any worsening sx's.    Anjelica, RN  Triage Nurse

## 2020-05-27 NOTE — TELEPHONE ENCOUNTER
See pt's MC message. LOV was on 7/19/19(px). Last colonoscopy was on 11/19/12.    Advised a video visit. Will await for his response.    Anjelica, RN  Triage Nurse

## 2020-06-02 ENCOUNTER — VIRTUAL VISIT (OUTPATIENT)
Dept: PEDIATRICS | Facility: CLINIC | Age: 45
End: 2020-06-02
Payer: COMMERCIAL

## 2020-06-02 DIAGNOSIS — R10.32 LLQ ABDOMINAL PAIN: Primary | ICD-10-CM

## 2020-06-02 DIAGNOSIS — M54.50 ACUTE MIDLINE LOW BACK PAIN WITHOUT SCIATICA: ICD-10-CM

## 2020-06-02 PROCEDURE — 99214 OFFICE O/P EST MOD 30 MIN: CPT | Mod: 95 | Performed by: INTERNAL MEDICINE

## 2020-06-02 RX ORDER — TAMSULOSIN HYDROCHLORIDE 0.4 MG/1
0.4 CAPSULE ORAL DAILY
Qty: 10 CAPSULE | Refills: 0 | Status: SHIPPED | OUTPATIENT
Start: 2020-06-02 | End: 2020-07-27

## 2020-06-02 NOTE — PROGRESS NOTES
"Jim Flores is a 45 year old male who is being evaluated via a billable video visit.      The patient has been notified of following:     \"This video visit will be conducted via a call between you and your physician/provider. We have found that certain health care needs can be provided without the need for an in-person physical exam.  This service lets us provide the care you need with a video conversation.  If a prescription is necessary we can send it directly to your pharmacy.  If lab work is needed we can place an order for that and you can then stop by our lab to have the test done at a later time.    Video visits are billed at different rates depending on your insurance coverage.  Please reach out to your insurance provider with any questions.    If during the course of the call the physician/provider feels a video visit is not appropriate, you will not be charged for this service.\"    Patient has given verbal consent for Video visit? Yes    How would you like to obtain your AVS? Humahart    Patient would like the video invitation sent by: Send to e-mail at: [email or web address] (acquired by [employer] in 2014)    Will anyone else be joining your video visit? No    Subjective     Jim Flores is a 45 year old male who presents today via video visit for the following health issues:    HPI    Video Start Time: 9:33 AM    Abdominal Pain      Duration: 4 weeks    Description (location/character/radiation): lower left abdominal pain, sometimes lower right also       Associated flank pain: None    Intensity:  moderate    Accompanying signs and symptoms:        Fever/Chills: no        Gas/Bloating: no        Nausea/vomitting: no        Diarrhea: no        Dysuria or Hematuria: no     History (previous similar pain/trauma/previous testing): h/o kidney stones    Precipitating or alleviating factors:       Pain worse with eating/BM/urination: yes       Pain relieved by BM: no     Therapies tried and outcome: None    Abdominal pain seems somewhat different " "than his typical kidney stone pain now. At onset was more flank area. Now is closer to waistline, mostly in the front.   Has done intermittent fasting over the past year.       Back pain      Duration: 3-4 months but worsening    Description (location/character/radiation): left lower back pain    Intensity:  mild    Accompanying signs and symptoms: none    History (similar episodes/previous evaluation): None    Precipitating or alleviating factors: sitting more (working from home), h/o kidney stones    Therapies tried and outcome: None    Symptoms are better in the early am, no pain on waking.    No radicular pain.    Will note some tingling in the left leg w/ bending for longer periods of time.      He feels that the pains are different.  Feels back pain more with movement.  Abdominal pain can be at rest.    Is working from home now.  Has a good office chair as well as a desk at which he can stand.    Had a colonoscopy in 2012, normal except for hemorrhoids.    Several abdominal CT scans over the past few years.   Shows multiple renal stones.     Reviewed and updated as needed this visit by Provider  Tobacco  Allergies  Meds  Problems  Med Hx  Surg Hx  Fam Hx         Review of Systems   Constitutional, HEENT, cardiovascular, pulmonary, gi and gu systems are negative, except as otherwise noted.      Objective    There were no vitals taken for this visit.  Estimated body mass index is 25.92 kg/m  as calculated from the following:    Height as of 7/19/19: 1.75 m (5' 8.9\").    Weight as of 7/19/19: 79.4 kg (175 lb).  Physical Exam     GENERAL: Healthy, alert and no distress  EYES: Eyes grossly normal to inspection.    RESP: No audible wheeze, cough, or visible cyanosis.  No visible retractions or increased work of breathing.    SKIN: Visible skin clear.   NEURO: Cranial nerves grossly intact.  Mentation and speech appropriate for age.  PSYCH: Mentation appears normal, affect normal/bright, normal speech and " appearance well-groomed.          Assessment & Plan     (R10.32) LLQ abdominal pain  (primary encounter diagnosis)  Cause is not clear. Potentially ureteral stone vs constipation vs diverticulosis/itis vs other  Plan: tamsulosin (FLOMAX) 0.4 MG capsule        Start with Flomax  If no change, call and can arrange CT abdomen/pelvis  If that is not helpful, then consider colonoscopy    (M54.5) Acute midline low back pain without sciatica  Based on clinical description, sounds to be myofascial. Cannot fully r/o disc or spine disorder  Plan: [facility] REFERRAL        Start w/ [facility] referral. Stretch at home. Movement as possible.       Video-Visit Details    Type of service:  Video Visit    Video End Time:9:51 AM    Originating Location (pt. Location): Home    Distant Location (provider location):  [facility]     Platform used for Video Visit: Arvind Mooney MD

## 2020-07-25 DIAGNOSIS — R10.32 LLQ ABDOMINAL PAIN: ICD-10-CM

## 2020-07-25 DIAGNOSIS — J31.0 CHRONIC RHINITIS: ICD-10-CM

## 2020-07-27 RX ORDER — FLUTICASONE PROPIONATE 50 MCG
SPRAY, SUSPENSION (ML) NASAL
Qty: 16 G | Refills: 4 | Status: SHIPPED | OUTPATIENT
Start: 2020-07-27 | End: 2022-11-21

## 2020-07-27 RX ORDER — TAMSULOSIN HYDROCHLORIDE 0.4 MG/1
CAPSULE ORAL
Qty: 10 CAPSULE | Refills: 0 | Status: SHIPPED | OUTPATIENT
Start: 2020-07-27 | End: 2020-08-18

## 2020-07-27 NOTE — TELEPHONE ENCOUNTER
Routing refill request to provider for review/approval because:  Labs out of range:  BP not at goal     Polina Casey RN

## 2020-08-17 DIAGNOSIS — R10.32 LLQ ABDOMINAL PAIN: ICD-10-CM

## 2020-08-18 RX ORDER — TAMSULOSIN HYDROCHLORIDE 0.4 MG/1
CAPSULE ORAL
Qty: 10 CAPSULE | Refills: 0 | Status: SHIPPED | OUTPATIENT
Start: 2020-08-18 | End: 2020-11-02

## 2020-08-18 NOTE — TELEPHONE ENCOUNTER
Routing refill request to provider for review/approval because:  Elevated BP in the last 12 months  Break in medication    Rosa Rodgers RN on 8/18/2020 at 10:34 AM

## 2020-08-18 NOTE — TELEPHONE ENCOUNTER
Refilled, please have patient schedule VV with Dr. Mooney to discuss ongoing symptoms and further management.    Lupe Hermosillo MD  Internal Medicine-Pediatrics

## 2020-08-18 NOTE — TELEPHONE ENCOUNTER
1st attempt: left detailed VM regarding message below and to return call to schedule. Please assist pt in scheduling VV with PCP.    Carrie Hoffman MA 11:37 AM 8/18/2020

## 2020-08-24 NOTE — TELEPHONE ENCOUNTER
3rd attempt:  Left message to schedule VV with Dr. Mooney.  Requested a call back and provided clinic number.    Mildred Kim

## 2020-08-28 DIAGNOSIS — Z79.899 ENCOUNTER FOR LONG-TERM (CURRENT) USE OF OTHER MEDICATIONS: ICD-10-CM

## 2020-08-28 DIAGNOSIS — M32.9 SYSTEMIC LUPUS ERYTHEMATOSUS (H): Primary | ICD-10-CM

## 2020-08-28 PROCEDURE — 84450 TRANSFERASE (AST) (SGOT): CPT | Performed by: INTERNAL MEDICINE

## 2020-08-28 PROCEDURE — 82040 ASSAY OF SERUM ALBUMIN: CPT | Performed by: INTERNAL MEDICINE

## 2020-08-28 PROCEDURE — 84460 ALANINE AMINO (ALT) (SGPT): CPT | Performed by: INTERNAL MEDICINE

## 2020-08-28 PROCEDURE — 82565 ASSAY OF CREATININE: CPT | Performed by: INTERNAL MEDICINE

## 2020-08-28 PROCEDURE — 36415 COLL VENOUS BLD VENIPUNCTURE: CPT | Performed by: INTERNAL MEDICINE

## 2020-08-29 LAB
ALBUMIN SERPL-MCNC: 4 G/DL (ref 3.4–5)
ALT SERPL W P-5'-P-CCNC: 36 U/L (ref 0–70)
AST SERPL W P-5'-P-CCNC: 29 U/L (ref 0–45)
CREAT SERPL-MCNC: 0.97 MG/DL (ref 0.66–1.25)
GFR SERPL CREATININE-BSD FRML MDRD: >90 ML/MIN/{1.73_M2}

## 2020-10-06 ENCOUNTER — VIRTUAL VISIT (OUTPATIENT)
Dept: FAMILY MEDICINE | Facility: OTHER | Age: 45
End: 2020-10-06

## 2020-10-06 NOTE — PROGRESS NOTES
"Date: 10/06/2020 18:19:37  Clinician: Good Singh  Clinician NPI: [id number]  Patient: Jim Flores  Patient : 1975  Patient Address: [address] [place] [address]  Patient Phone: [phone]  Visit Protocol: URI  Patient Summary:  Jim is a 45 year old ( : 1975 ) male who initiated a [facility] Visit for COVID-19 (Coronavirus) evaluation and screening. When asked the question \"Please sign me up to receive news, health information and promotions. \", Jim responded \"Yes\".    Jim states his symptoms started 1-2 days ago.   His symptoms consist of a headache, a cough, rhinitis, facial pain or pressure, malaise, and a sore throat.   Symptom details     Nasal secretions: The color of his mucus is clear.    Cough: Jim coughs a few times an hour and his cough is not more bothersome at night. Phlegm does not come into his throat when he coughs. He believes his cough is caused by post-nasal drip.     Sore throat: Jim reports having mild throat pain (1-3 on a 10 point pain scale), does not have exudate on his tonsils, and can swallow liquids. The lymph nodes in his neck are not enlarged. A rash has not appeared on the skin since the sore throat started.     Facial pain or pressure: The facial pain or pressure feels worse when bending over or leaning forward.     Headache: He states the headache is mild (1-3 on a 10 point pain scale).      Jim denies having ear pain, wheezing, fever, enlarged lymph nodes, nasal congestion, anosmia, vomiting, nausea, myalgias, chills, teeth pain, ageusia, and diarrhea. He also denies taking antibiotic medication in the past month and having recent facial or sinus surgery in the past 60 days. He is not experiencing dyspnea.   Precipitating events  Within the past week, Jim has not been exposed to someone with strep throat. He has not recently been exposed to someone with influenza. Jim has been in close contact with the following high risk individuals: " adults 65 or older and people with asthma, heart disease or diabetes.   Pertinent COVID-19 (Coronavirus) information  In the past 14 days, Jim has not worked in a congregate living setting.   He does not work or volunteer as healthcare worker or a  and does not work or volunteer in a healthcare facility.   Jim also has not lived in a congregate living setting in the past 14 days. He does not live with a healthcare worker.   Jim has not had a close contact with a laboratory-confirmed COVID-19 patient within 14 days of symptom onset.   Since December 2019, Jim and has had upper respiratory infection (URI) or influenza-like illness. Has not been diagnosed with lab-confirmed COVID-19 test      Date(s) of previous URI or influenza-like illness (free-text): 2/15/20 through 2/20/2020     Symptoms Jim experienced during previous URI or influenza-like illness as reported by the patient (free-text): sore throat, congestion, runny nose, 101F fever        Pertinent medical history  Jim does not need a return to work/school note.   Weight: 170 lbs   Jim does not smoke or use smokeless tobacco.   Additional information as reported by the patient (free text): I have Lupus and my symptoms feel like a flare, however, I don't generally have a sore throat and coughing with post nasal drip when I have a flare.   Weight: 170 lbs    MEDICATIONS: Folic Acid-Vit B6-Vit B12 (Calcium) oral, Omega-3 Fish Oil oral, cholecalciferol (vitamin D3) oral, prednisone oral, hydroxychloroquine oral, azathioprine oral, warfarin oral, venlafaxine oral, ALLERGIES: Sulfa (Sulfonamide Antibiotics), Penicillins, ciprofloxacin, Bactrim, cefuroxime  Clinician Response:  Dear Jim,   Your symptoms show that you may have coronavirus (COVID-19). This illness can cause fever, cough and trouble breathing. Many people get a mild case and get better on their own. Some people can get very sick.  What should I do?  We would like to test  "you for this virus.   1. Please call [phone] to schedule your visit. Explain that you were referred by [facility] to have a COVID-19 test. Be ready to share your [facility] visit ID number.  The following will serve as your written order for this COVID Test, ordered by me, for the indication of suspected COVID [Z20.828]: The test will be ordered in [facility], our electronic health record, after you are scheduled. It will show as ordered and authorized by Gilles Canales MD.  Order: COVID-19 (Coronavirus) PCR for SYMPTOMATIC testing from [facility].      2. When it's time for your COVID test:  Stay at least 6 feet away from others. (If someone will drive you to your test, stay in the backseat, as far away from the  as you can.)   Cover your mouth and nose with a mask, tissue or washcloth.  Go straight to the testing site. Don't make any stops on the way there or back.      3.Starting now: Stay home and away from others (self-isolate) until:   You've had no fever---and no medicine that reduces fever---for one full day (24 hours). And...   Your other symptoms have gotten better. For example, your cough or breathing has improved. And...   At least 10 days have passed since your symptoms started.       During this time, don't leave the house except for testing or medical care.   Stay in your own room, even for meals. Use your own bathroom if you can.   Stay away from others in your home. No hugging, kissing or shaking hands. No visitors.  Don't go to work, school or anywhere else.    Clean \"high touch\" surfaces often (doorknobs, counters, handles, etc.). Use a household cleaning spray or wipes. You'll find a full list of  on the EPA website: www.epa.gov/pesticide-registration/list-n-disinfectants-use-against-sars-cov-2.   Cover your mouth and nose with a mask, tissue or washcloth to avoid spreading germs.  Wash your hands and face often. Use soap and water.  Caregivers in these groups are at risk for severe illness due to " COVID-19:  o People 65 years and older  o People who live in a nursing home or long-term care facility  o People with chronic disease (lung, heart, cancer, diabetes, kidney, liver, immunologic)  o People who have a weakened immune system, including those who:   Are in cancer treatment  Take medicine that weakens the immune system, such as corticosteroids  Had a bone marrow or organ transplant  Have an immune deficiency  Have poorly controlled HIV or AIDS  Are obese (body mass index of 40 or higher)  Smoke regularly   o Caregivers should wear gloves while washing dishes, handling laundry and cleaning bedrooms and bathrooms.  o Use caution when washing and drying laundry: Don't shake dirty laundry, and use the warmest water setting that you can.  o For more tips, go to www.cdc.gov/coronavirus/2019-ncov/downloads/10Things.pdf.    How can I take care of myself?    Get lots of rest. Drink extra fluids (unless a doctor has told you not to).   Take Tylenol (acetaminophen) for fever or pain. If you have liver or kidney problems, ask your family doctor if it's okay to take Tylenol.   Adults can take either:    650 mg (two 325 mg pills) every 4 to 6 hours, or...   1,000 mg (two 500 mg pills) every 8 hours as needed.    Note: Don't take more than 3,000 mg in one day. Acetaminophen is found in many medicines (both prescribed and over-the-counter medicines). Read all labels to be sure you don't take too much.   For children, check the Tylenol bottle for the right dose. The dose is based on the child's age or weight.    If you have other health problems (like cancer, heart failure, an organ transplant or severe kidney disease): Call your specialty clinic if you don't feel better in the next 2 days.       Know when to call 911. Emergency warning signs include:    Trouble breathing or shortness of breath Pain or pressure in the chest that doesn't go away Feeling confused like you haven't felt before, or not being able to wake up  Bluish-colored lips or face.  Where can I get more information?   [facility] -- About COVID-19: [email or web address]   CDC -- What to Do If You're Sick: www.cdc.gov/coronavirus/2019-ncov/about/steps-when-sick.html   [facility] -- Ending Home Isolation: www.cdc.gov/coronavirus/2019-ncov/hcp/disposition-in-home-patients.html   [facility] -- Caring for Someone: www.cdc.gov/coronavirus/2019-ncov/if-you-are-sick/care-for-someone.html   [facility] -- Interim Guidance for Hospital Discharge to Home: www.[facility].mn.us/diseases/coronavirus/hcp/hospdischarge.pdf   [facility] clinical trials (COVID-19 research studies): clinicalaffairs.[facility]-clinical-trials    Below are the COVID-19 hotlines at the [place] Department of [facility] ([facility]). Interpreters are available.    For health questions: Call 535-794-1656 or 1-126.560.1886 (7 a.m. to 7 p.m.) For questions about schools and childcare: Call 717-808-9767 or 1-955.535.9372 (7 a.m. to 7 p.m.)    Diagnosis: Other malaise  Diagnosis ICD: R53.81

## 2020-10-07 DIAGNOSIS — Z20.822 SUSPECTED 2019 NOVEL CORONAVIRUS INFECTION: Primary | ICD-10-CM

## 2020-10-07 DIAGNOSIS — Z20.822 SUSPECTED 2019 NOVEL CORONAVIRUS INFECTION: ICD-10-CM

## 2020-10-07 PROCEDURE — 99207 PR NO CHARGE LOS: CPT

## 2020-10-07 PROCEDURE — U0003 INFECTIOUS AGENT DETECTION BY NUCLEIC ACID (DNA OR RNA); SEVERE ACUTE RESPIRATORY SYNDROME CORONAVIRUS 2 (SARS-COV-2) (CORONAVIRUS DISEASE [COVID-19]), AMPLIFIED PROBE TECHNIQUE, MAKING USE OF HIGH THROUGHPUT TECHNOLOGIES AS DESCRIBED BY CMS-2020-01-R: HCPCS | Performed by: FAMILY MEDICINE

## 2020-10-07 NOTE — ADDENDUM NOTE
Addended by: HERMELINDA VASQUEZ on: 10/7/2020 04:05 PM     Modules accepted: Level of Service, SmartSet

## 2020-10-08 LAB
SARS-COV-2 RNA SPEC QL NAA+PROBE: NOT DETECTED
SPECIMEN SOURCE: NORMAL

## 2020-11-29 ENCOUNTER — HEALTH MAINTENANCE LETTER (OUTPATIENT)
Age: 45
End: 2020-11-29

## 2020-11-29 ENCOUNTER — MYC MEDICAL ADVICE (OUTPATIENT)
Dept: PEDIATRICS | Facility: CLINIC | Age: 45
End: 2020-11-29

## 2020-11-30 ENCOUNTER — NURSE TRIAGE (OUTPATIENT)
Dept: PEDIATRICS | Facility: CLINIC | Age: 45
End: 2020-11-30

## 2020-11-30 ENCOUNTER — OFFICE VISIT (OUTPATIENT)
Dept: PEDIATRICS | Facility: CLINIC | Age: 45
End: 2020-11-30
Payer: COMMERCIAL

## 2020-11-30 VITALS
WEIGHT: 173.3 LBS | SYSTOLIC BLOOD PRESSURE: 126 MMHG | DIASTOLIC BLOOD PRESSURE: 82 MMHG | HEIGHT: 69 IN | OXYGEN SATURATION: 100 % | HEART RATE: 77 BPM | TEMPERATURE: 98.2 F | BODY MASS INDEX: 25.67 KG/M2

## 2020-11-30 DIAGNOSIS — I26.99 OTHER PULMONARY EMBOLISM WITHOUT ACUTE COR PULMONALE, UNSPECIFIED CHRONICITY (H): ICD-10-CM

## 2020-11-30 DIAGNOSIS — M32.12 SYSTEMIC LUPUS ERYTHEMATOSUS (SLE) WITH PERICARDITIS, UNSPECIFIED SLE TYPE (H): ICD-10-CM

## 2020-11-30 DIAGNOSIS — D68.59 HYPERCOAGULATION SYNDROME (H): ICD-10-CM

## 2020-11-30 DIAGNOSIS — K64.4 EXTERNAL HEMORRHOIDS: Primary | ICD-10-CM

## 2020-11-30 PROCEDURE — 99213 OFFICE O/P EST LOW 20 MIN: CPT | Performed by: INTERNAL MEDICINE

## 2020-11-30 ASSESSMENT — MIFFLIN-ST. JEOR: SCORE: 1648.28

## 2020-11-30 NOTE — PROGRESS NOTES
"Subjective     Jim Flores is a 45 year old male who presents to clinic today for the following health issues:    HPI     Hemorrhoids  Onset/Duration: 11/27  Description:   Lauren-anal lump: YES  Pain: YES, very painful per pt   Itching: no  Accompanying Signs & Symptoms:  Blood in stool: no  Changes in stool pattern: no  History:   Any previous GI studies done:none  Family History of colon cancer: no  Precipitating factors:   None  Alleviating factors:  Sitting a lot, very painful   Therapies tried and outcome: preparation H and hot baths not effective     Began about 3 days ago with soreness on anus.  Much more painful than has been in the past.  Usually just go away by themselves.    No constipation.  No prolonged sitting on toilet.      Has tried warm baths and over-the-counter preparation H.  Cold packs.         Review of Systems   CONSTITUTIONAL: NEGATIVE for fever, chills, change in weight  INTEGUMENTARY/SKIN: NEGATIVE for worrisome rashes, moles or lesions  ENT/MOUTH: NEGATIVE for ear, mouth and throat problems  GI: NEGATIVE for nausea, abdominal pain, heartburn, or change in bowel habits  HEME: NEGATIVE for bleeding problems      Objective    /82 (BP Location: Right arm, Patient Position: Sitting, Cuff Size: Adult Regular)   Pulse 77   Temp 98.2  F (36.8  C) (Tympanic)   Ht 1.748 m (5' 8.8\")   Wt 78.6 kg (173 lb 4.8 oz)   SpO2 100%   BMI 25.74 kg/m    Body mass index is 25.74 kg/m .  Physical Exam   GENERAL: healthy, alert and no distress  RECTAL: normal sphincter tone, but does have a 1.5 cm 9:00 hemorrhoid externally.  No bleeding or redness.            Assessment & Plan     External hemorrhoids  Patient Instructions   Make sure that you get 30 grams fiber per day--using either diet or fiber capsules (take 5 capsules twice daily with meals.)    After every BM, and then another 2 times each day:  Do a \"sitz bath\" (sitting in about an inch of warm bathwater), then put in a suppository " "over-the-counter (like Preparation H, with the active ingredient phenylephrine).  Then place some 1% hydrocortisone cream on the outer rectal area.      If you do not have access to a bathtub or a portable \"sitz bath\", use a witch hazel (Tucks) wipe on anus after a bowel movement. Then put in the suppository and some hydrocortisone cream.    Follow up with colorectal surgery if symptoms not improving.    Jim Almonte MD  Internal Medicine and Pediatrics          - COLORECTAL SURGERY REFERRAL    Systemic lupus erythematosus (SLE) with pericarditis, unspecified SLE type (H)  Management per Dr. Tidwell, with imuran and plaquenil.     Other pulmonary embolism without acute cor pulmonale, unspecified chronicity (H)  INR monitored at outside clinic and patient reports good control.     Hypercoagulation syndrome (H)  No evidnece of recurrent PE.        BMI:   Estimated body mass index is 25.74 kg/m  as calculated from the following:    Height as of this encounter: 1.748 m (5' 8.8\").    Weight as of this encounter: 78.6 kg (173 lb 4.8 oz).            Patient Instructions   Make sure that you get 30 grams fiber per day--using either diet or fiber capsules (take 5 capsules twice daily with meals.)    After every BM, and then another 2 times each day:  Do a \"sitz bath\" (sitting in about an inch of warm bathwater), then put in a suppository over-the-counter (like Preparation H, with the active ingredient phenylephrine).  Then place some 1% hydrocortisone cream on the outer rectal area.      If you do not have access to a bathtub or a portable \"sitz bath\", use a witch hazel (Tucks) wipe on anus after a bowel movement. Then put in the suppository and some hydrocortisone cream.    Follow up with colorectal surgery if symptoms not improving.    Jim Almonte MD  Internal Medicine and Pediatrics           Return in about 1 week (around 12/7/2020), or if symptoms worsen or fail to improve, for with colorectal.    Jim Almonte MD  [facility]

## 2020-11-30 NOTE — PATIENT INSTRUCTIONS
"Make sure that you get 30 grams fiber per day--using either diet or fiber capsules (take 5 capsules twice daily with meals.)    After every BM, and then another 2 times each day:  Do a \"sitz bath\" (sitting in about an inch of warm bathwater), then put in a suppository over-the-counter (like Preparation H, with the active ingredient phenylephrine).  Then place some 1% hydrocortisone cream on the outer rectal area.      If you do not have access to a bathtub or a portable \"sitz bath\", use a witch hazel (Tucks) wipe on anus after a bowel movement. Then put in the suppository and some hydrocortisone cream.    Follow up with colorectal surgery if symptoms not improving.    Jim Almonte MD  Internal Medicine and Pediatrics       "

## 2020-11-30 NOTE — TELEPHONE ENCOUNTER
LMTCB in regards to mychart message.     PATIENT should be seen in clinic.     Kathryn Kelsey RN Flex

## 2020-12-03 ENCOUNTER — TRANSFERRED RECORDS (OUTPATIENT)
Dept: HEALTH INFORMATION MANAGEMENT | Facility: CLINIC | Age: 45
End: 2020-12-03

## 2020-12-11 ENCOUNTER — OFFICE VISIT (OUTPATIENT)
Dept: PEDIATRICS | Facility: CLINIC | Age: 45
End: 2020-12-11
Payer: COMMERCIAL

## 2020-12-11 VITALS
WEIGHT: 174 LBS | DIASTOLIC BLOOD PRESSURE: 100 MMHG | BODY MASS INDEX: 25.84 KG/M2 | SYSTOLIC BLOOD PRESSURE: 148 MMHG | TEMPERATURE: 98.6 F | HEART RATE: 84 BPM | OXYGEN SATURATION: 98 %

## 2020-12-11 DIAGNOSIS — D68.59 HYPERCOAGULATION SYNDROME (H): ICD-10-CM

## 2020-12-11 DIAGNOSIS — Z79.899 ENCOUNTER FOR LONG-TERM (CURRENT) USE OF OTHER MEDICATIONS: ICD-10-CM

## 2020-12-11 DIAGNOSIS — E21.3 HYPERPARATHYROIDISM (H): ICD-10-CM

## 2020-12-11 DIAGNOSIS — N20.0 RENAL STONE: ICD-10-CM

## 2020-12-11 DIAGNOSIS — I26.99 OTHER PULMONARY EMBOLISM WITHOUT ACUTE COR PULMONALE, UNSPECIFIED CHRONICITY (H): ICD-10-CM

## 2020-12-11 DIAGNOSIS — Z00.00 ROUTINE GENERAL MEDICAL EXAMINATION AT A HEALTH CARE FACILITY: Primary | ICD-10-CM

## 2020-12-11 DIAGNOSIS — N52.9 ERECTILE DYSFUNCTION, UNSPECIFIED ERECTILE DYSFUNCTION TYPE: ICD-10-CM

## 2020-12-11 DIAGNOSIS — M32.9 SYSTEMIC LUPUS ERYTHEMATOSUS (H): ICD-10-CM

## 2020-12-11 DIAGNOSIS — M32.12 SYSTEMIC LUPUS ERYTHEMATOSUS (SLE) WITH PERICARDITIS, UNSPECIFIED SLE TYPE (H): ICD-10-CM

## 2020-12-11 LAB
ERYTHROCYTE [DISTWIDTH] IN BLOOD BY AUTOMATED COUNT: 13.7 % (ref 10–15)
HCT VFR BLD AUTO: 45 % (ref 40–53)
HGB BLD-MCNC: 15.2 G/DL (ref 13.3–17.7)
MCH RBC QN AUTO: 30.5 PG (ref 26.5–33)
MCHC RBC AUTO-ENTMCNC: 33.8 G/DL (ref 31.5–36.5)
MCV RBC AUTO: 90 FL (ref 78–100)
PLATELET # BLD AUTO: 183 10E9/L (ref 150–450)
RBC # BLD AUTO: 4.98 10E12/L (ref 4.4–5.9)
WBC # BLD AUTO: 4.5 10E9/L (ref 4–11)

## 2020-12-11 PROCEDURE — 84100 ASSAY OF PHOSPHORUS: CPT | Performed by: INTERNAL MEDICINE

## 2020-12-11 PROCEDURE — 84460 ALANINE AMINO (ALT) (SGPT): CPT | Performed by: INTERNAL MEDICINE

## 2020-12-11 PROCEDURE — 80061 LIPID PANEL: CPT | Performed by: INTERNAL MEDICINE

## 2020-12-11 PROCEDURE — 82306 VITAMIN D 25 HYDROXY: CPT | Performed by: INTERNAL MEDICINE

## 2020-12-11 PROCEDURE — 83970 ASSAY OF PARATHORMONE: CPT | Performed by: INTERNAL MEDICINE

## 2020-12-11 PROCEDURE — 36415 COLL VENOUS BLD VENIPUNCTURE: CPT | Performed by: INTERNAL MEDICINE

## 2020-12-11 PROCEDURE — 80048 BASIC METABOLIC PNL TOTAL CA: CPT | Performed by: INTERNAL MEDICINE

## 2020-12-11 PROCEDURE — 83090 ASSAY OF HOMOCYSTEINE: CPT | Performed by: INTERNAL MEDICINE

## 2020-12-11 PROCEDURE — 82040 ASSAY OF SERUM ALBUMIN: CPT | Performed by: INTERNAL MEDICINE

## 2020-12-11 PROCEDURE — 99396 PREV VISIT EST AGE 40-64: CPT | Performed by: INTERNAL MEDICINE

## 2020-12-11 PROCEDURE — 85027 COMPLETE CBC AUTOMATED: CPT | Performed by: INTERNAL MEDICINE

## 2020-12-11 PROCEDURE — 82565 ASSAY OF CREATININE: CPT | Performed by: INTERNAL MEDICINE

## 2020-12-11 PROCEDURE — 84450 TRANSFERASE (AST) (SGOT): CPT | Performed by: INTERNAL MEDICINE

## 2020-12-11 RX ORDER — DOXAZOSIN 1 MG/1
1 TABLET ORAL AT BEDTIME
Qty: 30 TABLET | Refills: 1 | Status: SHIPPED | OUTPATIENT
Start: 2020-12-11 | End: 2021-10-18

## 2020-12-11 RX ORDER — SILDENAFIL 100 MG/1
100 TABLET, FILM COATED ORAL DAILY PRN
Qty: 6 TABLET | Refills: 11 | Status: SHIPPED | OUTPATIENT
Start: 2020-12-11 | End: 2021-10-18

## 2020-12-11 ASSESSMENT — ENCOUNTER SYMPTOMS
ABDOMINAL PAIN: 0
DIZZINESS: 0
CHILLS: 0
PARESTHESIAS: 0
FREQUENCY: 0
FEVER: 0
CONSTIPATION: 0
SORE THROAT: 0
HEMATOCHEZIA: 1
MYALGIAS: 0
PALPITATIONS: 0
JOINT SWELLING: 0
HEADACHES: 0
NERVOUS/ANXIOUS: 0
HEMATURIA: 1
DIARRHEA: 0
SHORTNESS OF BREATH: 0
ARTHRALGIAS: 0
COUGH: 0
WEAKNESS: 0
NAUSEA: 0
HEARTBURN: 0
EYE PAIN: 0
DYSURIA: 0

## 2020-12-11 NOTE — PROGRESS NOTES
SUBJECTIVE:   CC: Jim Flores is an 45 year old male who presents for preventative health visit.       Patient has been advised of split billing requirements and indicates understanding: Yes  Healthy Habits:     Getting at least 3 servings of Calcium per day:  NO    Bi-annual eye exam:  Yes    Dental care twice a year:  NO    Sleep apnea or symptoms of sleep apnea:  None    Diet:  Gluten-free/reduced    Frequency of exercise:  None    Taking medications regularly:  Yes    Medication side effects:  None    PHQ-2 Total Score: 0    Additional concerns today:  No    SLE w/ hypercoagulable state and hx of PE. Managed by hematology and rheum. Updated med list today. No acute sx related to these issues.    Hyperparathyroid. Would like labs updated today.    ED. Increasing sx. Reviewed rx options.    Renal stones. Has side effects from Flomax. Uses prn only. Discussed trial of doxazosin.     No prior dx of HTN. BP today is elevated. No cardiac sx such as CP, palpitations, PND, orthopnea, GRAMAJO or peripheral edema.   BP Readings from Last 3 Encounters:   12/11/20 (!) 148/100   11/30/20 126/82   07/19/19 132/88     Lab Results   Component Value Date    GLC 71 07/19/2019     01/17/2019     Wt Readings from Last 4 Encounters:   12/11/20 78.9 kg (174 lb)   11/30/20 78.6 kg (173 lb 4.8 oz)   07/19/19 79.4 kg (175 lb)   01/17/19 83.9 kg (185 lb)       Today's PHQ-2 Score:   PHQ-2 ( 1999 Pf[employer]) 12/11/2020   Q1: Little interest or pleasure in doing things 0   Q2: Feeling down, depressed or hopeless 0   PHQ-2 Score 0   Q1: Little interest or pleasure in doing things Not at all   Q2: Feeling down, depressed or hopeless Not at all   PHQ-2 Score 0       Abuse: Current or Past(Physical, Sexual or Emotional)- No  Do you feel safe in your environment? Yes        Social History     Tobacco Use     Smoking status: Never Smoker     Smokeless tobacco: Never Used   Substance Use Topics     Alcohol use: Yes     Alcohol/week: 0.0 standard  drinks     Frequency: 4 or more times a week     Drinks per session: 1 or 2     Binge frequency: Never     Comment: three per week     If you drink alcohol do you typically have >3 drinks per day or >7 drinks per week? No    Alcohol Use 12/11/2020   Prescreen: >3 drinks/day or >7 drinks/week? Yes   Prescreen: >3 drinks/day or >7 drinks/week? -   AUDIT SCORE  3       Last PSA:   PSA   Date Value Ref Range Status   08/23/2008 0.75 0 - 4 ug/L Final       Reviewed orders with patient. Reviewed health maintenance and updated orders accordingly - Yes      Reviewed and updated as needed this visit by Provider  Tobacco  Allergies  Meds  Problems  Med Hx  Surg Hx  Fam Hx           Review of Systems   Constitutional: Negative for chills and fever.   HENT: Negative for congestion, ear pain, hearing loss and sore throat.    Eyes: Negative for pain and visual disturbance.   Respiratory: Negative for cough and shortness of breath.    Cardiovascular: Negative for chest pain, palpitations and peripheral edema.   Gastrointestinal: Positive for hematochezia. Negative for abdominal pain, constipation, diarrhea, heartburn and nausea.   Genitourinary: Positive for hematuria, impotence and urgency. Negative for discharge, dysuria, frequency and genital sores.   Musculoskeletal: Negative for arthralgias, joint swelling and myalgias.   Skin: Negative for rash.   Neurological: Negative for dizziness, weakness, headaches and paresthesias.   Psychiatric/Behavioral: Negative for mood changes. The patient is not nervous/anxious.          OBJECTIVE:   BP (!) 148/100 (BP Location: Right arm, Cuff Size: Adult Regular)   Pulse 84   Temp 98.6  F (37  C) (Tympanic)   Wt 78.9 kg (174 lb)   SpO2 98%   BMI 25.84 kg/m      Physical Exam  GENERAL: healthy, alert and no distress  EYES: Eyes grossly normal to inspection, PERRL and conjunctivae and sclerae normal  HENT: ear canals and TM's normal  NECK: no adenopathy, no asymmetry, masses, or  "scars and thyroid normal to palpation  RESP: lungs clear to auscultation - no rales, rhonchi or wheezes  CV: regular rate and rhythm, normal S1 S2, no murmur, click or rub, no peripheral edema and peripheral pulses strong  ABDOMEN: soft, nontender, no hepatosplenomegaly, no masses and bowel sounds normal  MS: no gross musculoskeletal defects noted, no edema  SKIN: no suspicious lesions or rashes  NEURO: Normal strength and tone, mentation intact and speech normal  PSYCH: mentation appears normal, affect normal/bright        ASSESSMENT/PLAN:       ICD-10-CM    1. Routine general medical examination at a health care facility  Z00.00 CBC with platelets     Phosphorus     Basic metabolic panel  (Ca, Cl, CO2, Creat, Gluc, K, Na, BUN)     Lipid Profile (Chol, Trig, HDL, LDL calc)   2. Systemic lupus erythematosus (SLE) with pericarditis, unspecified SLE type (H)  M32.12 Vitamin D Deficiency   3. Renal stone  N20.0 doxazosin (CARDURA) 1 MG tablet   4. Hyperparathyroidism (H)  E21.3 Parathyroid Hormone Intact     Phosphorus     Homocysteine   5. Hypercoagulation syndrome (H)  D68.59    6. Other pulmonary embolism without acute cor pulmonale, unspecified chronicity (H)  I26.99    7. Erectile dysfunction, unspecified erectile dysfunction type  N52.9 sildenafil (VIAGRA) 100 MG tablet       COUNSELING:   Reviewed preventive health counseling, as reflected in patient instructions    Estimated body mass index is 25.74 kg/m  as calculated from the following:    Height as of 11/30/20: 1.748 m (5' 8.8\").    Weight as of 11/30/20: 78.6 kg (173 lb 4.8 oz).         He reports that he has never smoked. He has never used smokeless tobacco.      Good Mooney MD  [facility]  "

## 2020-12-12 LAB
ALBUMIN SERPL-MCNC: 4.1 G/DL (ref 3.4–5)
ALT SERPL W P-5'-P-CCNC: 49 U/L (ref 0–70)
ANION GAP SERPL CALCULATED.3IONS-SCNC: 1 MMOL/L (ref 3–14)
AST SERPL W P-5'-P-CCNC: 35 U/L (ref 0–45)
BUN SERPL-MCNC: 10 MG/DL (ref 7–30)
CALCIUM SERPL-MCNC: 8.6 MG/DL (ref 8.5–10.1)
CHLORIDE SERPL-SCNC: 107 MMOL/L (ref 94–109)
CHOLEST SERPL-MCNC: 234 MG/DL
CO2 SERPL-SCNC: 30 MMOL/L (ref 20–32)
CREAT SERPL-MCNC: 0.94 MG/DL (ref 0.66–1.25)
CREAT SERPL-MCNC: 0.94 MG/DL (ref 0.66–1.25)
GFR SERPL CREATININE-BSD FRML MDRD: >90 ML/MIN/{1.73_M2}
GFR SERPL CREATININE-BSD FRML MDRD: >90 ML/MIN/{1.73_M2}
GLUCOSE SERPL-MCNC: 76 MG/DL (ref 70–99)
HDLC SERPL-MCNC: 53 MG/DL
LDLC SERPL CALC-MCNC: 164 MG/DL
NONHDLC SERPL-MCNC: 181 MG/DL
PHOSPHATE SERPL-MCNC: 2.2 MG/DL (ref 2.5–4.5)
POTASSIUM SERPL-SCNC: 3.8 MMOL/L (ref 3.4–5.3)
PTH-INTACT SERPL-MCNC: 89 PG/ML (ref 18–80)
SODIUM SERPL-SCNC: 138 MMOL/L (ref 133–144)
TRIGL SERPL-MCNC: 87 MG/DL

## 2020-12-14 LAB — DEPRECATED CALCIDIOL+CALCIFEROL SERPL-MC: 60 UG/L (ref 20–75)

## 2020-12-16 LAB — HCYS SERPL-SCNC: 4.8 UMOL/L (ref 4–12)

## 2020-12-17 ENCOUNTER — MYC MEDICAL ADVICE (OUTPATIENT)
Dept: PEDIATRICS | Facility: CLINIC | Age: 45
End: 2020-12-17

## 2020-12-21 ENCOUNTER — MYC MEDICAL ADVICE (OUTPATIENT)
Dept: PEDIATRICS | Facility: CLINIC | Age: 45
End: 2020-12-21

## 2020-12-22 NOTE — TELEPHONE ENCOUNTER
Form has been completed by provider.  Called patient and he would like form mailed to home address.  Confirmed with patient.  Copy sent to abstracting.    Mildred Kim

## 2020-12-30 ENCOUNTER — MYC MEDICAL ADVICE (OUTPATIENT)
Dept: PEDIATRICS | Facility: CLINIC | Age: 45
End: 2020-12-30

## 2021-01-14 ENCOUNTER — TRANSFERRED RECORDS (OUTPATIENT)
Dept: HEALTH INFORMATION MANAGEMENT | Facility: CLINIC | Age: 46
End: 2021-01-14

## 2021-02-10 ENCOUNTER — MYC MEDICAL ADVICE (OUTPATIENT)
Dept: PEDIATRICS | Facility: CLINIC | Age: 46
End: 2021-02-10

## 2021-02-10 NOTE — TELEPHONE ENCOUNTER
Lab results faxed to Dr. Tidwell at provided number (JOHNIE on file).  Sent [employer] message to patient informing him of this.    Mildred Kim

## 2021-06-07 ENCOUNTER — TRANSFERRED RECORDS (OUTPATIENT)
Dept: HEALTH INFORMATION MANAGEMENT | Facility: CLINIC | Age: 46
End: 2021-06-07

## 2021-07-09 ENCOUNTER — OFFICE VISIT (OUTPATIENT)
Dept: URGENT CARE | Facility: URGENT CARE | Age: 46
End: 2021-07-09
Payer: COMMERCIAL

## 2021-07-09 ENCOUNTER — HOSPITAL ENCOUNTER (EMERGENCY)
Facility: CLINIC | Age: 46
Discharge: HOME | End: 2021-07-09
Attending: PHYSICIAN ASSISTANT | Admitting: PHYSICIAN ASSISTANT
Payer: COMMERCIAL

## 2021-07-09 ENCOUNTER — ANCILLARY PROCEDURE (OUTPATIENT)
Dept: GENERAL RADIOLOGY | Facility: CLINIC | Age: 46
End: 2021-07-09
Attending: FAMILY MEDICINE
Payer: COMMERCIAL

## 2021-07-09 ENCOUNTER — APPOINTMENT (OUTPATIENT)
Dept: CT IMAGING | Facility: CLINIC | Age: 46
End: 2021-07-09
Attending: NURSE PRACTITIONER
Payer: COMMERCIAL

## 2021-07-09 VITALS
DIASTOLIC BLOOD PRESSURE: 88 MMHG | RESPIRATION RATE: 16 BRPM | SYSTOLIC BLOOD PRESSURE: 136 MMHG | HEART RATE: 80 BPM | OXYGEN SATURATION: 99 % | TEMPERATURE: 98.7 F

## 2021-07-09 VITALS
TEMPERATURE: 98.9 F | OXYGEN SATURATION: 99 % | SYSTOLIC BLOOD PRESSURE: 146 MMHG | HEART RATE: 89 BPM | DIASTOLIC BLOOD PRESSURE: 101 MMHG

## 2021-07-09 DIAGNOSIS — N17.9 ACUTE KIDNEY INJURY (H): ICD-10-CM

## 2021-07-09 DIAGNOSIS — R10.30 LOWER ABDOMINAL PAIN: ICD-10-CM

## 2021-07-09 DIAGNOSIS — M54.50 BILATERAL LOW BACK PAIN WITHOUT SCIATICA, UNSPECIFIED CHRONICITY: ICD-10-CM

## 2021-07-09 DIAGNOSIS — N20.1 RIGHT URETERAL STONE: ICD-10-CM

## 2021-07-09 DIAGNOSIS — N13.30 HYDRONEPHROSIS, RIGHT: ICD-10-CM

## 2021-07-09 DIAGNOSIS — N20.0 CALCULUS OF KIDNEY: Primary | ICD-10-CM

## 2021-07-09 LAB
ALBUMIN UR-MCNC: 20 MG/DL
ALBUMIN UR-MCNC: NEGATIVE MG/DL
ANION GAP SERPL CALCULATED.3IONS-SCNC: 2 MMOL/L (ref 3–14)
APPEARANCE UR: CLEAR
APPEARANCE UR: CLEAR
BACTERIA #/AREA URNS HPF: ABNORMAL /HPF
BASOPHILS # BLD AUTO: 0 10E9/L (ref 0–0.2)
BASOPHILS NFR BLD AUTO: 0.6 %
BILIRUB UR QL STRIP: NEGATIVE
BILIRUB UR QL STRIP: NEGATIVE
BUN SERPL-MCNC: 15 MG/DL (ref 7–30)
CALCIUM SERPL-MCNC: 9.1 MG/DL (ref 8.5–10.1)
CHLORIDE SERPL-SCNC: 104 MMOL/L (ref 94–109)
CO2 SERPL-SCNC: 32 MMOL/L (ref 20–32)
COLOR UR AUTO: YELLOW
COLOR UR AUTO: YELLOW
CREAT SERPL-MCNC: 1.45 MG/DL (ref 0.66–1.25)
DIFFERENTIAL METHOD BLD: ABNORMAL
EOSINOPHIL # BLD AUTO: 0.1 10E9/L (ref 0–0.7)
EOSINOPHIL NFR BLD AUTO: 1.7 %
ERYTHROCYTE [DISTWIDTH] IN BLOOD BY AUTOMATED COUNT: 14.4 % (ref 10–15)
GFR SERPL CREATININE-BSD FRML MDRD: 57 ML/MIN/{1.73_M2}
GLUCOSE SERPL-MCNC: 84 MG/DL (ref 70–99)
GLUCOSE UR STRIP-MCNC: NEGATIVE MG/DL
GLUCOSE UR STRIP-MCNC: NEGATIVE MG/DL
HCT VFR BLD AUTO: 49.4 % (ref 40–53)
HGB BLD-MCNC: 16.2 G/DL (ref 13.3–17.7)
HGB UR QL STRIP: ABNORMAL
HGB UR QL STRIP: ABNORMAL
IMM GRANULOCYTES # BLD: 0 10E9/L (ref 0–0.4)
IMM GRANULOCYTES NFR BLD: 0.1 %
KETONES UR STRIP-MCNC: NEGATIVE MG/DL
KETONES UR STRIP-MCNC: NEGATIVE MG/DL
LEUKOCYTE ESTERASE UR QL STRIP: ABNORMAL
LEUKOCYTE ESTERASE UR QL STRIP: NEGATIVE
LYMPHOCYTES # BLD AUTO: 0.7 10E9/L (ref 0.8–5.3)
LYMPHOCYTES NFR BLD AUTO: 10.4 %
MCH RBC QN AUTO: 28.8 PG (ref 26.5–33)
MCHC RBC AUTO-ENTMCNC: 32.8 G/DL (ref 31.5–36.5)
MCV RBC AUTO: 88 FL (ref 78–100)
MONOCYTES # BLD AUTO: 0.9 10E9/L (ref 0–1.3)
MONOCYTES NFR BLD AUTO: 12.4 %
MUCOUS THREADS #/AREA URNS LPF: PRESENT /LPF
MUCOUS THREADS #/AREA URNS LPF: PRESENT /LPF
NEUTROPHILS # BLD AUTO: 5.2 10E9/L (ref 1.6–8.3)
NEUTROPHILS NFR BLD AUTO: 74.8 %
NITRATE UR QL: NEGATIVE
NITRATE UR QL: NEGATIVE
NON-SQ EPI CELLS #/AREA URNS LPF: ABNORMAL /LPF
NRBC # BLD AUTO: 0 10*3/UL
NRBC BLD AUTO-RTO: 0 /100
PH UR STRIP: 6 PH (ref 5–7)
PH UR STRIP: 7 PH (ref 5–7)
PLATELET # BLD AUTO: 203 10E9/L (ref 150–450)
POTASSIUM SERPL-SCNC: 4.2 MMOL/L (ref 3.4–5.3)
RBC # BLD AUTO: 5.63 10E12/L (ref 4.4–5.9)
RBC #/AREA URNS AUTO: 24 /HPF (ref 0–2)
RBC #/AREA URNS AUTO: ABNORMAL /HPF
SODIUM SERPL-SCNC: 138 MMOL/L (ref 133–144)
SOURCE: ABNORMAL
SOURCE: ABNORMAL
SP GR UR STRIP: 1.01 (ref 1–1.03)
SP GR UR STRIP: 1.02 (ref 1–1.03)
SPERM #/AREA URNS HPF: PRESENT /HPF
UROBILINOGEN UR STRIP-ACNC: 0.2 EU/DL (ref 0.2–1)
UROBILINOGEN UR STRIP-MCNC: NORMAL MG/DL (ref 0–2)
WBC # BLD AUTO: 7 10E9/L (ref 4–11)
WBC #/AREA URNS AUTO: 13 /HPF (ref 0–5)
WBC #/AREA URNS AUTO: ABNORMAL /HPF

## 2021-07-09 PROCEDURE — 80048 BASIC METABOLIC PNL TOTAL CA: CPT | Performed by: EMERGENCY MEDICINE

## 2021-07-09 PROCEDURE — 81001 URINALYSIS AUTO W/SCOPE: CPT | Performed by: PHYSICIAN ASSISTANT

## 2021-07-09 PROCEDURE — 87086 URINE CULTURE/COLONY COUNT: CPT | Performed by: PHYSICIAN ASSISTANT

## 2021-07-09 PROCEDURE — 85025 COMPLETE CBC W/AUTO DIFF WBC: CPT | Performed by: EMERGENCY MEDICINE

## 2021-07-09 PROCEDURE — 74019 RADEX ABDOMEN 2 VIEWS: CPT | Performed by: RADIOLOGY

## 2021-07-09 PROCEDURE — 99285 EMERGENCY DEPT VISIT HI MDM: CPT | Mod: 25

## 2021-07-09 PROCEDURE — 99214 OFFICE O/P EST MOD 30 MIN: CPT | Performed by: FAMILY MEDICINE

## 2021-07-09 PROCEDURE — 74176 CT ABD & PELVIS W/O CONTRAST: CPT

## 2021-07-09 RX ORDER — OXYCODONE HYDROCHLORIDE 5 MG/1
5 TABLET ORAL EVERY 6 HOURS PRN
Qty: 10 TABLET | Refills: 0 | Status: SHIPPED | OUTPATIENT
Start: 2021-07-09 | End: 2022-03-29

## 2021-07-09 RX ORDER — ONDANSETRON 4 MG/1
4 TABLET, ORALLY DISINTEGRATING ORAL EVERY 8 HOURS PRN
Qty: 12 TABLET | Refills: 0 | Status: SHIPPED | OUTPATIENT
Start: 2021-07-09 | End: 2021-07-12

## 2021-07-09 ASSESSMENT — ENCOUNTER SYMPTOMS
DIFFICULTY URINATING: 0
FREQUENCY: 0
FLANK PAIN: 1
HEMATURIA: 0
FEVER: 0
NAUSEA: 1
DYSURIA: 0

## 2021-07-09 NOTE — ED TRIAGE NOTES
Pt presents to ED with c/o flank pain. Right side worse than left. Largest stone 13 mm. UA negative at [facility]. Recommending CT scan.

## 2021-07-09 NOTE — ED PROVIDER NOTES
History   Chief Complaint:  Flank Pain       HPI   Jim Flores is a 46 year old male on coumadin with history of PE and kidney stones who presents with flank pain. Patient had an onset of left sided flank pain. He has an extensive history of kidney stones and says that this pain is similar to when he has had them in the past. His pain then moved into his right flank, and was accompanied by severe nausea which made him concerned about a potential infection. He was seen today at the [facility] and x-ray showed a 13 mm stone. His UA was negative. They sent him here for CT scan and further evaluation. Here in the ED, patient denies any fevers or urinary symptoms. He follows with Dr. Ramesh Gibson of Urology at the [facility]. He has been taking ibuprofen for his pain and his last dose was early this morning.       Review of Systems   Constitutional: Negative for fever.   Gastrointestinal: Positive for nausea.   Genitourinary: Positive for flank pain. Negative for difficulty urinating, dysuria, frequency and hematuria.   All other systems reviewed and are negative.      Allergies:   Ampicillin  Bactrim  Cefuroxime  Cipro  Penicillins  Sulfamethoxazole-Trimethoprim  Toradol    Medications:  Plaquenil   Deltasone   Inderal   Effexor   Coumadin   Ativan   Flomax    Past Medical History:    Anxiety   Hypercalciuria   Kidney stones   Pulmonary infarct   GERD  PE     Past Surgical History:    Colonoscopy  Hernia repair   Kidney stone removal      Social History:  Patient presents to the ED alone.    Physical Exam     Patient Vitals for the past 24 hrs:   BP Temp Pulse Resp SpO2   07/09/21 1658 (!) 142/107 98.7  F (37.1  C) 89 18 99 %       Physical Exam  General: No distress.   Head:  Scalp is atraumatic.  Eyes:  The pupils are equal, round, and reactive to light. Normal conjunctiva.   ENT:                                      Ears:  The external ears are normal.   Nose:  The external nose is normal.  Throat:  The  oropharynx is normal. Mucus membranes are moist.                 Neck:  Normal range of motion.   CV:  Normal rate. No murmur. 2+ radial pulses  Resp:  Breath sounds are clear bilaterally. Non-labored, no retractions or accessory muscle use.  GI:  Abdomen is soft, no distension, no tenderness. No CVA tenderness.   MS:  Normal range of motion. No acute deformities.   Skin:  Warm and dry. No rash.   Neuro:   Alert. Strength and sensation grossly intact.   Psych: Awake. Alert.  Appropriate interactions.       Emergency Department Course     Imaging:  CT abdomen pelvis stone protocol with contrast:  1. There is a new 5 x 4 mm obstructing right UPJ stone with mild right hydronephrosis. 2. Numerous bilateral renal calculi. Reading per radiology.     Laboratory:  CBC: WBC: 7.0, HGB: 16.2, PLT: 203  BMP: Anion Gap: 2 (low), GFR: 57 (low), Creatinine: 1.45 (H) o/w WNL     UA: blood small (!), protein albumin 20 (!), leukocyte esterase trace (!), WBC 13 (H), RBC 24 (H), mucous present (!), sperm present (!) o/w WNL  Urine Culture Aerobic Bacterial: Pending    Emergency Department Course:    Reviewed:  nursing notes, vitals, past medical history and care everywhere    Assessments:    1845 Initial assessment     1959 I rechecked the patient and discussed the results of their workup thus far.     Consults:     1933 I spoke with Dr. Turner of the Urology service from [place] regarding patient's presentation, findings, and plan of care.    Interventions:  1940 NS 1L IV Bolus     Disposition:  The patient was discharged to home.       Impression & Plan     Medical Decision Making:  Jim Flores is a 46 year old male with medical history including kidney stones followed by urology at the [facility] presents emergency department with right flank pain.  Work-up in the emergency department reveals a 5 x 4 mm obstructing right UPJ stone with right mild hydronephrosis.  There is mild acute kidney injury with creatinine 1.45.  Urine without  clear signs of infection, urine culture pending.  There is no leukocytosis or fever to suggest infection.  Given the presence of acute kidney injury, I consulted the patient's urology clinic, they agreed with plan for close follow-up.  Patient had no pain while in the emergency department and no intervention necessary.  Discussed pain management at home including ibuprofen, oxycodone as needed for breakthrough pain, and heat.  Flomax prescribed.  Zofran also prescribed for nausea.  Patient has a long history of kidney stones and understands portance close follow-up and return precautions discussed.  All questions and concerns addressed prior to discharge home.    Diagnosis:    ICD-10-CM    1. Right ureteral stone  N20.1 Routine UA with microscopic   2. Hydronephrosis, right  N13.30    3. Acute kidney injury (H)  N17.9        Discharge Medications:  New Prescriptions    ONDANSETRON (ZOFRAN ODT) 4 MG ODT TAB    Take 1 tablet (4 mg) by mouth every 8 hours as needed for nausea    OXYCODONE (ROXICODONE) 5 MG TABLET    Take 1 tablet (5 mg) by mouth every 6 hours as needed for pain       Scribe Disclosure:  I, Demetris Santiago, am serving as a scribe at 6:54 PM on 7/9/2021 to document services personally performed by Leonela Hoang PA-C based on my observations and the provider's statements to me.              Leonela Hoang PA-C  07/09/21 2672

## 2021-07-09 NOTE — PROGRESS NOTES
SUBJECTIVE:   Jim Flores is a 46 year old male with a history of hyperparathyroidism s/p parathyroidectomy (in 2018) and with a history of calcium-containing kidney and ureteral stones.  Patient is presenting with a chief complaint of sudden-onset pain at the bilateral abdomen (bilateral lower abdomen) and back (bilateral lower back).  Patient also experienced nausea last night but not now.  No fevers.  The pain has come in waves.  Pain level was 8 out of 10 last night and now is 4 out of 10.     Onset of symptoms was last night..   No obvious injury to the abodmen/back.  No lifting injuries.  Patient is on Coumadin and has not noticed blood in the urine.      .    Treatment measures tried include heat packs, Ibuprofen.  The heat is helping to reduce the pain.  .  Predisposing factors include history of calcium-containing kidney and ureteral stones.  .    Past Medical History:   Diagnosis Date     Antiphospholipid antibody positive      Anxiety state, unspecified      Hypercalciuria      Nephrolithiasis      Pulmonary infarct (H) 9/1/2008     SLE with normal kidneys (H)    ureter stones  Kidney stones  Primary hyperparathyroidism s/p partial parathyroidectomy in 2018.      Current Outpatient Medications   Medication Sig Dispense Refill     fluticasone (FLONASE) 50 MCG/ACT nasal spray SHAKE LIQUID AND USE 1 TO 2 SPRAYS IN EACH NOSTRIL DAILY 16 g 4     hydroxychloroquine (PLAQUENIL) 200 MG tablet Take 200 mg by mouth 2 times daily.       LORazepam (ATIVAN) 0.5 MG tablet Take 1 tablet by mouth every 6 hours as needed for anxiety. 30 tablet 0     magnesium oxide (MAG-OX) 400 (241.3 Mg) MG tablet Take 1 tablet (400 mg) by mouth daily       NONFORMULARY Take 2 capsules by mouth every morning Methyl Guard Plus       Omega-3 Fatty Acids (FISH OIL) 1200 MG CPDR Take 2,400 Units by mouth daily       predniSONE (DELTASONE) 5 MG tablet Take 1.5 tablets (7.5 mg) by mouth daily       Probiotic Product (PROBIOTIC DAILY PO) Take  1 capsule by mouth daily       propranolol (INDERAL) 10 MG tablet prn       sildenafil (VIAGRA) 100 MG tablet Take 1 tablet (100 mg) by mouth daily as needed (ED) 6 tablet 11     tamsulosin (FLOMAX) 0.4 MG capsule TAKE 1 CAPSULE(0.4 MG) BY MOUTH DAILY 30 capsule 0     venlafaxine (EFFEXOR-XR) 150 MG 24 hr capsule Take 1 capsule by mouth 2 times daily. 30 capsule 1     VITAMIN D, CHOLECALCIFEROL, PO Take 2,000 Units by mouth 2 times daily       warfarin (COUMADIN) 10 MG tablet TK 1.5 TS D OR AS DIRECTED BASED ON LAB RESULTS  3     doxazosin (CARDURA) 1 MG tablet Take 1 tablet (1 mg) by mouth At Bedtime (Patient not taking: Reported on 7/9/2021) 30 tablet 1     Social History     Tobacco Use     Smoking status: Never Smoker     Smokeless tobacco: Never Used   Substance Use Topics     Alcohol use: Yes     Alcohol/week: 0.0 standard drinks     Frequency: 4 or more times a week     Drinks per session: 1 or 2     Binge frequency: Never     Comment: three per week       ROS:  CONSTITUTIONAL:NEGATIVE  for fevers.   : positive for bilateral low back pain, bilateral lower abdominal pain.      OBJECTIVE:  BP (!) 146/101   Pulse 89   Temp 98.9  F (37.2  C) (Tympanic)   SpO2 99%   GENERAL APPEARANCE: healthy, alert and no distress  ABDOMEN:  soft, nontender, no HSM or masses and bowel sounds normal  SKIN: no suspicious lesions or rashes   BACK:  There is mild discomfort with percussion over the right costovertebral angle.   No pain with palpation over the spinous processes of the lumbar spine and over the muscles of the lumbar back.      LABS:    Results for orders placed or performed in visit on 07/09/21   UA reflex to Microscopic and Culture     Status: Abnormal    Specimen: Midstream Urine   Result Value Ref Range    Color Urine Yellow     Appearance Urine Clear     Glucose Urine Negative NEG^Negative mg/dL    Bilirubin Urine Negative NEG^Negative    Ketones Urine Negative NEG^Negative mg/dL    Specific Gravity Urine  1.020 1.003 - 1.035    Blood Urine Trace (A) NEG^Negative    pH Urine 7.0 5.0 - 7.0 pH    Protein Albumin Urine Negative NEG^Negative mg/dL    Urobilinogen Urine 0.2 0.2 - 1.0 EU/dL    Nitrite Urine Negative NEG^Negative    Leukocyte Esterase Urine Negative NEG^Negative    Source Midstream Urine    Urine Microscopic     Status: Abnormal   Result Value Ref Range    WBC Urine 0 - 5 OTO5^0 - 5 /HPF    RBC Urine O - 2 OTO2^O - 2 /HPF    Squamous Epithelial /LPF Urine Few FEW^Few /LPF    Bacteria Urine Few (A) NEG^Negative /HPF    Mucous Urine Present (A) NEG^Negative /LPF     X-rays:  I viewed all X-ray images during this clinic encounter.  The KUB X-ray showed bilateral renal calculi with largest stones measuring more than 13mm diameter (especially on the right kidney).  .    ASSESSMENT:  Renal calculi with the largest stones measuring more than 13mm diameter, thus, less likely for the stone to pass on its own.      PLAN:  Patient will go to the [facility] emergency room for further evaluation and treatment.        Jaswinder Mathews MD

## 2021-07-10 NOTE — DISCHARGE INSTRUCTIONS
*You may resume diet and activities.  Drink plenty of fluids.  *Take medications as prescribed.  Ibuprofen and/or tylenol for pain.  Oxycodone for severe pain not relieved by ibuprofen/tylenol.  Flomax.  Zofran for nausea. Continue your current medications.  *Follow-up with your doctor or urology as directed.  *Return if you develop fever, are unable to urinate, cannot keep fluids down, or become worse in any way.    Discharge Instructions  Kidney Stones    Kidney stones are a common problem that can cause a lot of pain but fortunately are usually not dangerous and can be generally treated with medicine at home.  However, sometimes your condition may be worse than it seemed at first, or may get worse with time.     You need to follow-up with your regular doctor within 3 days.    Most kidney stones will pass on their own, but occasionally stones may need to be removed by an urologist. We will send you home with a urine strainer. Be sure to urinate into this, or urinate into a container and pour the urine through the fine filter to catch the kidney stone as it comes out. The stone will seem like a pebble or grain of sand. Be sure to save this in a zip-lock bag and take it to the doctor s office with you.       Return to the Emergency Department if:  Your pain is not controlled.  You are vomiting and can t keep fluids or medications down.  You develop fever (>101)  You feel much more ill or develop new symptoms  What can I do to help myself?  Be sure to drink plenty of fluids  Staying active is good, and may help the stone to pass. You may do whatever you feel up to doing without restrictions.   Treatment:  Non-steroidal anti-inflammatory drugs (NSAIDs). This includes prescription medicines like Toradol   and non-prescription medicines like ibuprofen (Advil , Nuprin  ). These pain relievers are very effective for kidney stones.  Narcotic pain pills. If you have been given a narcotic (such as codeine, hydrocodone, or  oxycodone) do not drive for four hours after you have taken it. If the narcotic contains acetaminophen (Tylenol), do not take Tylenol with it. All narcotics will cause constipation, so eat a high fiber diet.    Nausea medication.  Nausea and vomiting are common with kidney stones, so your physician may send you home with medicine for this.   Flomax (Tamsulosin). This medicine is sometimes used for men with prostate problems, but also can help kidney stones to pass. This medicine can lower blood pressure, and you may feel faint, especially when you first stand up. Be sure to get up gradually, sit down if you feel faint, and avoid activity where feeling faint would be dangerous, such as climbing ladders.     Remember that you can always come back to the Emergency Department if you are not able to see your regular doctor in the amount of time listed above, if you get any new symptoms, or if there is anything that worries you.      Opioid Medication Information    You have been given a prescription for an opioid (narcotic) pain medicine and/or have received a pain medicine while here in the Emergency Department. These medicines can make you drowsy or impaired. You must not drive, operate dangerous equipment, or engage in any other dangerous activities while taking these medications. If you drive while taking these medications, you could be arrested for DUI, or driving under the influence. Do not drink any alcohol while you are taking these medications.   Opioid pain medications can cause addiction. If you have a history of chemical dependency of any type, you are at a higher risk of becoming addicted to pain medications.  Only take these prescribed medications to treat your pain when all other options have been tried. Take it for as short a time and as few doses as possible. Store your pain pills in a secure place, as they are frequently stolen and provide a dangerous opportunity for children or visitors in your house  to start abusing these powerful medications. We will not replace any lost or stolen medicine.  As soon as your pain is better, you should flush all your remaining medication.   Many prescription pain medications contain Tylenol  (acetaminophen), including Vicodin , Tylenol #3 , Norco , Lortab , and Percocet .  You should not take any extra pills of Tylenol  if you are using these prescription medications or you can get very sick.  Do not ever take more than 4000 mg of acetaminophen in any 24 hour period.  All opioids tend to cause constipation. Drink plenty of water and eat foods that have a lot of fiber, such as fruits, vegetables, prune juice, apple juice and high fiber cereal.  Take a laxative if you don t move your bowels at least every other day. Miralax , Milk of Magnesia, Colace , or Senna  can be used to keep you regular.

## 2021-07-11 LAB
BACTERIA SPEC CULT: NO GROWTH
Lab: NORMAL
SPECIMEN SOURCE: NORMAL

## 2021-09-01 ENCOUNTER — MYC MEDICAL ADVICE (OUTPATIENT)
Dept: PEDIATRICS | Facility: CLINIC | Age: 46
End: 2021-09-01

## 2021-09-01 DIAGNOSIS — Z13.6 CARDIOVASCULAR SCREENING; LDL GOAL LESS THAN 130: ICD-10-CM

## 2021-09-01 DIAGNOSIS — E21.3 HYPERPARATHYROIDISM (H): Primary | ICD-10-CM

## 2021-09-03 ENCOUNTER — LAB (OUTPATIENT)
Dept: LAB | Facility: CLINIC | Age: 46
End: 2021-09-03
Payer: COMMERCIAL

## 2021-09-03 DIAGNOSIS — M32.9 SYSTEMIC LUPUS ERYTHEMATOSUS (H): ICD-10-CM

## 2021-09-03 DIAGNOSIS — Z79.899 ENCOUNTER FOR LONG-TERM (CURRENT) USE OF MEDICATIONS: Primary | ICD-10-CM

## 2021-09-03 LAB
ALBUMIN UR-MCNC: 100 MG/DL
APPEARANCE UR: ABNORMAL
BACTERIA #/AREA URNS HPF: ABNORMAL /HPF
BASOPHILS # BLD AUTO: 0 10E3/UL (ref 0–0.2)
BASOPHILS NFR BLD AUTO: 1 %
BILIRUB UR QL STRIP: NEGATIVE
COLOR UR AUTO: YELLOW
EOSINOPHIL # BLD AUTO: 0.2 10E3/UL (ref 0–0.7)
EOSINOPHIL NFR BLD AUTO: 4 %
ERYTHROCYTE [DISTWIDTH] IN BLOOD BY AUTOMATED COUNT: 14.5 % (ref 10–15)
GLUCOSE UR STRIP-MCNC: NEGATIVE MG/DL
HCT VFR BLD AUTO: 46.8 % (ref 40–53)
HGB BLD-MCNC: 15.6 G/DL (ref 13.3–17.7)
HGB UR QL STRIP: ABNORMAL
KETONES UR STRIP-MCNC: NEGATIVE MG/DL
LEUKOCYTE ESTERASE UR QL STRIP: ABNORMAL
LYMPHOCYTES # BLD AUTO: 0.8 10E3/UL (ref 0.8–5.3)
LYMPHOCYTES NFR BLD AUTO: 20 %
MCH RBC QN AUTO: 28.6 PG (ref 26.5–33)
MCHC RBC AUTO-ENTMCNC: 33.3 G/DL (ref 31.5–36.5)
MCV RBC AUTO: 86 FL (ref 78–100)
MONOCYTES # BLD AUTO: 0.5 10E3/UL (ref 0–1.3)
MONOCYTES NFR BLD AUTO: 13 %
NEUTROPHILS # BLD AUTO: 2.3 10E3/UL (ref 1.6–8.3)
NEUTROPHILS NFR BLD AUTO: 62 %
NITRATE UR QL: NEGATIVE
PH UR STRIP: 7 [PH] (ref 5–7)
PLATELET # BLD AUTO: 189 10E3/UL (ref 150–450)
RBC # BLD AUTO: 5.46 10E6/UL (ref 4.4–5.9)
RBC #/AREA URNS AUTO: ABNORMAL /HPF
SP GR UR STRIP: 1.02 (ref 1–1.03)
SQUAMOUS #/AREA URNS AUTO: ABNORMAL /LPF
UROBILINOGEN UR STRIP-ACNC: 0.2 E.U./DL
WBC # BLD AUTO: 3.7 10E3/UL (ref 4–11)
WBC #/AREA URNS AUTO: ABNORMAL /HPF

## 2021-09-03 PROCEDURE — 86160 COMPLEMENT ANTIGEN: CPT

## 2021-09-03 PROCEDURE — 81001 URINALYSIS AUTO W/SCOPE: CPT

## 2021-09-03 PROCEDURE — 85025 COMPLETE CBC W/AUTO DIFF WBC: CPT

## 2021-09-03 PROCEDURE — 84450 TRANSFERASE (AST) (SGOT): CPT

## 2021-09-03 PROCEDURE — 36415 COLL VENOUS BLD VENIPUNCTURE: CPT

## 2021-09-03 PROCEDURE — 82040 ASSAY OF SERUM ALBUMIN: CPT

## 2021-09-03 PROCEDURE — 82565 ASSAY OF CREATININE: CPT

## 2021-09-03 PROCEDURE — 86225 DNA ANTIBODY NATIVE: CPT

## 2021-09-03 PROCEDURE — 84460 ALANINE AMINO (ALT) (SGPT): CPT

## 2021-09-04 LAB
ALBUMIN SERPL-MCNC: 3.8 G/DL (ref 3.4–5)
ALT SERPL W P-5'-P-CCNC: 31 U/L (ref 0–70)
AST SERPL W P-5'-P-CCNC: 29 U/L (ref 0–45)
CREAT SERPL-MCNC: 1.17 MG/DL (ref 0.66–1.25)
GFR SERPL CREATININE-BSD FRML MDRD: 73 ML/MIN/1.73M2

## 2021-09-07 LAB
C3 SERPL-MCNC: 100 MG/DL (ref 81–157)
C4 SERPL-MCNC: 23 MG/DL (ref 13–39)
DSDNA AB SER-ACNC: 0.8 IU/ML

## 2021-09-19 ENCOUNTER — HEALTH MAINTENANCE LETTER (OUTPATIENT)
Age: 46
End: 2021-09-19

## 2021-09-20 ENCOUNTER — LAB (OUTPATIENT)
Dept: LAB | Facility: CLINIC | Age: 46
End: 2021-09-20
Payer: COMMERCIAL

## 2021-09-20 DIAGNOSIS — E21.3 HYPERPARATHYROIDISM (H): ICD-10-CM

## 2021-09-20 DIAGNOSIS — Z13.6 CARDIOVASCULAR SCREENING; LDL GOAL LESS THAN 130: ICD-10-CM

## 2021-09-20 PROCEDURE — 82306 VITAMIN D 25 HYDROXY: CPT

## 2021-09-20 PROCEDURE — 83090 ASSAY OF HOMOCYSTEINE: CPT

## 2021-09-20 PROCEDURE — 83704 LIPOPROTEIN BLD QUAN PART: CPT

## 2021-09-20 PROCEDURE — 36415 COLL VENOUS BLD VENIPUNCTURE: CPT

## 2021-09-20 PROCEDURE — 82172 ASSAY OF APOLIPOPROTEIN: CPT | Mod: 90

## 2021-09-20 PROCEDURE — 99000 SPECIMEN HANDLING OFFICE-LAB: CPT

## 2021-09-20 PROCEDURE — 80061 LIPID PANEL: CPT | Mod: 90

## 2021-09-21 LAB
APO B100 SERPL-MCNC: 122 MG/DL
CHOLEST SERPL-MCNC: 232 MG/DL
DEPRECATED CALCIDIOL+CALCIFEROL SERPL-MC: 83 UG/L (ref 20–75)
FASTING STATUS PATIENT QL REPORTED: YES
HDLC SERPL-MCNC: 46 MG/DL
LDLC SERPL CALC-MCNC: 166 MG/DL
NONHDLC SERPL-MCNC: 186 MG/DL
TRIGL SERPL-MCNC: 98 MG/DL

## 2021-09-22 LAB — HCYS SERPL-SCNC: 8.9 UMOL/L (ref 4–12)

## 2021-10-05 ENCOUNTER — TRANSFERRED RECORDS (OUTPATIENT)
Dept: HEALTH INFORMATION MANAGEMENT | Facility: CLINIC | Age: 46
End: 2021-10-05
Payer: COMMERCIAL

## 2021-10-15 ENCOUNTER — OFFICE VISIT (OUTPATIENT)
Dept: PEDIATRICS | Facility: CLINIC | Age: 46
End: 2021-10-15
Payer: COMMERCIAL

## 2021-10-15 VITALS
BODY MASS INDEX: 24.1 KG/M2 | RESPIRATION RATE: 12 BRPM | DIASTOLIC BLOOD PRESSURE: 84 MMHG | HEART RATE: 90 BPM | HEIGHT: 69 IN | SYSTOLIC BLOOD PRESSURE: 126 MMHG | TEMPERATURE: 97.8 F | OXYGEN SATURATION: 98 % | WEIGHT: 162.7 LBS

## 2021-10-15 DIAGNOSIS — D68.59 HYPERCOAGULATION SYNDROME (H): ICD-10-CM

## 2021-10-15 DIAGNOSIS — Z86.711 HISTORY OF PULMONARY EMBOLISM: ICD-10-CM

## 2021-10-15 DIAGNOSIS — Z00.00 ROUTINE GENERAL MEDICAL EXAMINATION AT A HEALTH CARE FACILITY: Primary | ICD-10-CM

## 2021-10-15 DIAGNOSIS — E21.3 HYPERPARATHYROIDISM (H): ICD-10-CM

## 2021-10-15 DIAGNOSIS — M32.9 SYSTEMIC LUPUS ERYTHEMATOSUS, UNSPECIFIED SLE TYPE, UNSPECIFIED ORGAN INVOLVEMENT STATUS (H): ICD-10-CM

## 2021-10-15 PROCEDURE — 99396 PREV VISIT EST AGE 40-64: CPT | Performed by: INTERNAL MEDICINE

## 2021-10-15 SDOH — HEALTH STABILITY: PHYSICAL HEALTH: ON AVERAGE, HOW MANY DAYS PER WEEK DO YOU ENGAGE IN MODERATE TO STRENUOUS EXERCISE (LIKE A BRISK WALK)?: 1 DAY

## 2021-10-15 SDOH — ECONOMIC STABILITY: FOOD INSECURITY: WITHIN THE PAST 12 MONTHS, YOU WORRIED THAT YOUR FOOD WOULD RUN OUT BEFORE YOU GOT MONEY TO BUY MORE.: NEVER TRUE

## 2021-10-15 SDOH — ECONOMIC STABILITY: INCOME INSECURITY: HOW HARD IS IT FOR YOU TO PAY FOR THE VERY BASICS LIKE FOOD, HOUSING, MEDICAL CARE, AND HEATING?: NOT HARD AT ALL

## 2021-10-15 SDOH — ECONOMIC STABILITY: INCOME INSECURITY: IN THE LAST 12 MONTHS, WAS THERE A TIME WHEN YOU WERE NOT ABLE TO PAY THE MORTGAGE OR RENT ON TIME?: NO

## 2021-10-15 SDOH — ECONOMIC STABILITY: FOOD INSECURITY: WITHIN THE PAST 12 MONTHS, THE FOOD YOU BOUGHT JUST DIDN'T LAST AND YOU DIDN'T HAVE MONEY TO GET MORE.: NEVER TRUE

## 2021-10-15 SDOH — HEALTH STABILITY: PHYSICAL HEALTH: ON AVERAGE, HOW MANY MINUTES DO YOU ENGAGE IN EXERCISE AT THIS LEVEL?: 20 MIN

## 2021-10-15 ASSESSMENT — ENCOUNTER SYMPTOMS
SHORTNESS OF BREATH: 0
NERVOUS/ANXIOUS: 0
MYALGIAS: 0
COUGH: 0
HEARTBURN: 0
JOINT SWELLING: 0
CHILLS: 0
PARESTHESIAS: 0
ARTHRALGIAS: 0
HEADACHES: 1
FEVER: 0
NAUSEA: 0
WEAKNESS: 0
DIARRHEA: 0
PALPITATIONS: 0
ABDOMINAL PAIN: 0
FREQUENCY: 0
DIZZINESS: 0
DYSURIA: 0
EYE PAIN: 0
CONSTIPATION: 0
HEMATOCHEZIA: 1
HEMATURIA: 1
SORE THROAT: 0

## 2021-10-15 ASSESSMENT — SOCIAL DETERMINANTS OF HEALTH (SDOH)
DO YOU BELONG TO ANY CLUBS OR ORGANIZATIONS SUCH AS CHURCH GROUPS UNIONS, FRATERNAL OR ATHLETIC GROUPS, OR SCHOOL GROUPS?: NO
HOW OFTEN DO YOU GET TOGETHER WITH FRIENDS OR RELATIVES?: ONCE A WEEK
HOW OFTEN DO YOU ATTEND CHURCH OR RELIGIOUS SERVICES?: NEVER
IN A TYPICAL WEEK, HOW MANY TIMES DO YOU TALK ON THE PHONE WITH FAMILY, FRIENDS, OR NEIGHBORS?: TWICE A WEEK

## 2021-10-15 ASSESSMENT — LIFESTYLE VARIABLES
HOW OFTEN DO YOU HAVE A DRINK CONTAINING ALCOHOL: 2-3 TIMES A WEEK
HOW MANY STANDARD DRINKS CONTAINING ALCOHOL DO YOU HAVE ON A TYPICAL DAY: 1 OR 2
HOW OFTEN DO YOU HAVE SIX OR MORE DRINKS ON ONE OCCASION: NEVER

## 2021-10-15 ASSESSMENT — MIFFLIN-ST. JEOR: SCORE: 1598.38

## 2021-10-15 NOTE — PROGRESS NOTES
SUBJECTIVE:   CC: Jim Flores is an 46 year old male who presents for preventative health visit.         Patient has been advised of split billing requirements and indicates understanding: Yes  Healthy Habits:     Getting at least 3 servings of Calcium per day:  Yes    Bi-annual eye exam:  Yes    Dental care twice a year:  NO    Sleep apnea or symptoms of sleep apnea:  None    Diet:  Low salt and Gluten-free/reduced    Frequency of exercise:  2-3 days/week    Duration of exercise:  15-30 minutes    Taking medications regularly:  Yes    Medication side effects:  None    PHQ-2 Total Score: 0    Additional concerns today:  No    SLE w/ hypercoagulable state. Has hx of PE.  On warfarin chronically.  Managed by rheumatology and hematology.   Sx overall are controlled.     Hyperparathyroidism. Hx of surgery.  Managed by endocrinology.     Today's PHQ-2 Score:   PHQ-2 ( 1999 Pfizer) 10/15/2021   Q1: Little interest or pleasure in doing things 0   Q2: Feeling down, depressed or hopeless 0   PHQ-2 Score 0   Q1: Little interest or pleasure in doing things Not at all   Q2: Feeling down, depressed or hopeless Not at all   PHQ-2 Score 0       Abuse: Current or Past(Physical, Sexual or Emotional)- No  Do you feel safe in your environment? Yes    Have you ever done Advance Care Planning? (For example, a Health Directive, POLST, or a discussion with a medical provider or your loved ones about your wishes): No, advance care planning information given to patient to review.  Patient declined advance care planning discussion at this time.    Social History     Tobacco Use     Smoking status: Never Smoker     Smokeless tobacco: Never Used   Substance Use Topics     Alcohol use: Yes     Alcohol/week: 0.0 standard drinks     Comment: three per week         Alcohol Use 10/15/2021   Prescreen: >3 drinks/day or >7 drinks/week? No   Prescreen: >3 drinks/day or >7 drinks/week? -   AUDIT SCORE  -       Last PSA:   PSA   Date Value Ref Range  "Status   08/23/2008 0.75 0 - 4 ug/L Final       Reviewed orders with patient. Reviewed health maintenance and updated orders accordingly - Yes      Review of Systems   Constitutional: Negative for chills and fever.   HENT: Negative for congestion, ear pain, hearing loss and sore throat.    Eyes: Negative for pain and visual disturbance.   Respiratory: Negative for cough and shortness of breath.    Cardiovascular: Negative for chest pain, palpitations and peripheral edema.   Gastrointestinal: Positive for hematochezia. Negative for abdominal pain, constipation, diarrhea, heartburn and nausea.   Genitourinary: Positive for hematuria. Negative for discharge, dysuria, frequency, genital sores, impotence and urgency.   Musculoskeletal: Negative for arthralgias, joint swelling and myalgias.   Skin: Negative for rash.   Neurological: Positive for headaches. Negative for dizziness, weakness and paresthesias.   Psychiatric/Behavioral: Negative for mood changes. The patient is not nervous/anxious.        OBJECTIVE:   /84 (BP Location: Right arm, Patient Position: Sitting, Cuff Size: Adult Regular)   Pulse 90   Temp 97.8  F (36.6  C) (Temporal)   Resp 12   Ht 1.753 m (5' 9\")   Wt 73.8 kg (162 lb 11.2 oz)   SpO2 98%   BMI 24.03 kg/m      Physical Exam  GENERAL: healthy, alert and no distress  EYES: Eyes grossly normal to inspection, PERRL and conjunctivae and sclerae normal  HENT: ear canals and TM's normal  NECK: no adenopathy, no asymmetry  RESP: lungs clear to auscultation - no rales, rhonchi or wheezes  CV: regular rate and rhythm, normal S1 S2, no murmur, no peripheral edema and peripheral pulses strong  ABDOMEN: soft, nontender, bowel sounds normal  MS: no gross musculoskeletal defects noted, no edema  SKIN: no suspicious lesions or rashes  NEURO: Normal strength and tone, mentation intact and speech normal  PSYCH: mentation appears normal, affect normal/bright    ASSESSMENT/PLAN:       ICD-10-CM    1. " "Routine general medical examination at a health care facility  Z00.00    2. Hypercoagulation syndrome (H)  D68.59    3. Systemic lupus erythematosus, unspecified SLE type, unspecified organ involvement status (H)  M32.9    4. Hyperparathyroidism (H)  E21.3    5. History of pulmonary embolism  Z86.711          COUNSELING:   Reviewed preventive health counseling, as reflected in patient instructions    Estimated body mass index is 24.03 kg/m  as calculated from the following:    Height as of this encounter: 1.753 m (5' 9\").    Weight as of this encounter: 73.8 kg (162 lb 11.2 oz).       He reports that he has never smoked. He has never used smokeless tobacco.        Good Mooney MD  [facility]  "

## 2022-02-11 ENCOUNTER — TRANSFERRED RECORDS (OUTPATIENT)
Dept: HEALTH INFORMATION MANAGEMENT | Facility: CLINIC | Age: 47
End: 2022-02-11
Payer: COMMERCIAL

## 2022-02-28 DIAGNOSIS — Z11.59 ENCOUNTER FOR SCREENING FOR OTHER VIRAL DISEASES: Primary | ICD-10-CM

## 2022-03-18 ENCOUNTER — TELEPHONE (OUTPATIENT)
Dept: PEDIATRICS | Facility: CLINIC | Age: 47
End: 2022-03-18
Payer: COMMERCIAL

## 2022-03-18 ENCOUNTER — LAB (OUTPATIENT)
Dept: LAB | Facility: CLINIC | Age: 47
End: 2022-03-18
Payer: COMMERCIAL

## 2022-03-18 DIAGNOSIS — E55.9 VITAMIN D DEFICIENCY: ICD-10-CM

## 2022-03-18 DIAGNOSIS — Z79.899 ENCOUNTER FOR LONG-TERM (CURRENT) USE OF OTHER MEDICATIONS: ICD-10-CM

## 2022-03-18 DIAGNOSIS — E55.9 VITAMIN D DEFICIENCY: Primary | ICD-10-CM

## 2022-03-18 DIAGNOSIS — N20.0 NEPHROLITHIASIS: Primary | ICD-10-CM

## 2022-03-18 DIAGNOSIS — M32.9 SYSTEMIC LUPUS ERYTHEMATOSUS (H): ICD-10-CM

## 2022-03-18 LAB — DEPRECATED CALCIDIOL+CALCIFEROL SERPL-MC: 31 UG/L (ref 20–75)

## 2022-03-18 PROCEDURE — 82306 VITAMIN D 25 HYDROXY: CPT

## 2022-03-18 PROCEDURE — 84460 ALANINE AMINO (ALT) (SGPT): CPT

## 2022-03-18 PROCEDURE — 82565 ASSAY OF CREATININE: CPT | Mod: XE

## 2022-03-18 PROCEDURE — 80069 RENAL FUNCTION PANEL: CPT

## 2022-03-18 PROCEDURE — 36415 COLL VENOUS BLD VENIPUNCTURE: CPT

## 2022-03-18 PROCEDURE — 84450 TRANSFERASE (AST) (SGOT): CPT

## 2022-03-18 PROCEDURE — 82040 ASSAY OF SERUM ALBUMIN: CPT | Mod: 59

## 2022-03-18 NOTE — TELEPHONE ENCOUNTER
Pt talked to Geno that he is coming in today to get lab done which were ordered by outside provider. He would like  to place an order to check Vitamin D today. He usually get vitamin D done through [facility].     After huddling with , placed vitamin D order.    Anjelica, RN  [facility] (PAL)  [facility]

## 2022-03-19 LAB
ALBUMIN SERPL-MCNC: 3.5 G/DL (ref 3.4–5)
ALBUMIN SERPL-MCNC: 3.6 G/DL (ref 3.4–5)
ALT SERPL W P-5'-P-CCNC: 39 U/L (ref 0–70)
ANION GAP SERPL CALCULATED.3IONS-SCNC: 5 MMOL/L (ref 3–14)
AST SERPL W P-5'-P-CCNC: 27 U/L (ref 0–45)
BUN SERPL-MCNC: 11 MG/DL (ref 7–30)
CALCIUM SERPL-MCNC: 9.1 MG/DL (ref 8.5–10.1)
CHLORIDE BLD-SCNC: 108 MMOL/L (ref 94–109)
CO2 SERPL-SCNC: 27 MMOL/L (ref 20–32)
CREAT SERPL-MCNC: 1.03 MG/DL (ref 0.66–1.25)
CREAT SERPL-MCNC: 1.06 MG/DL (ref 0.66–1.25)
GFR SERPL CREATININE-BSD FRML MDRD: 87 ML/MIN/1.73M2
GFR SERPL CREATININE-BSD FRML MDRD: 90 ML/MIN/1.73M2
GLUCOSE BLD-MCNC: 82 MG/DL (ref 70–99)
PHOSPHATE SERPL-MCNC: 2.2 MG/DL (ref 2.5–4.5)
POTASSIUM BLD-SCNC: 4.1 MMOL/L (ref 3.4–5.3)
SODIUM SERPL-SCNC: 140 MMOL/L (ref 133–144)

## 2022-03-29 ENCOUNTER — VIRTUAL VISIT (OUTPATIENT)
Dept: PEDIATRICS | Facility: CLINIC | Age: 47
End: 2022-03-29
Payer: COMMERCIAL

## 2022-03-29 DIAGNOSIS — F41.1 ANXIETY STATE: Primary | ICD-10-CM

## 2022-03-29 PROCEDURE — 99213 OFFICE O/P EST LOW 20 MIN: CPT | Mod: 95 | Performed by: INTERNAL MEDICINE

## 2022-03-29 RX ORDER — VENLAFAXINE HYDROCHLORIDE 225 MG/1
225 TABLET, EXTENDED RELEASE ORAL DAILY
Qty: 90 TABLET | Refills: 3 | Status: SHIPPED | OUTPATIENT
Start: 2022-03-29 | End: 2023-02-13 | Stop reason: DRUGHIGH

## 2022-03-29 RX ORDER — LORAZEPAM 0.5 MG/1
0.5 TABLET ORAL EVERY 6 HOURS PRN
Qty: 20 TABLET | Refills: 0 | Status: SHIPPED | OUTPATIENT
Start: 2022-03-29 | End: 2022-11-21

## 2022-04-07 ENCOUNTER — TRANSFERRED RECORDS (OUTPATIENT)
Dept: HEALTH INFORMATION MANAGEMENT | Facility: CLINIC | Age: 47
End: 2022-04-07
Payer: COMMERCIAL

## 2022-04-07 LAB — HEP C HIM: NORMAL

## 2022-04-07 RX ORDER — HYDROCHLOROTHIAZIDE 12.5 MG/1
12.5 TABLET ORAL DAILY
Status: ON HOLD | COMMUNITY
End: 2023-09-07

## 2022-04-08 ENCOUNTER — LAB (OUTPATIENT)
Dept: LAB | Facility: CLINIC | Age: 47
End: 2022-04-08
Attending: COLON & RECTAL SURGERY
Payer: COMMERCIAL

## 2022-04-08 DIAGNOSIS — Z11.59 ENCOUNTER FOR SCREENING FOR OTHER VIRAL DISEASES: ICD-10-CM

## 2022-04-08 PROCEDURE — U0005 INFEC AGEN DETEC AMPLI PROBE: HCPCS

## 2022-04-09 LAB — SARS-COV-2 RNA RESP QL NAA+PROBE: NEGATIVE

## 2022-04-12 ENCOUNTER — HOSPITAL ENCOUNTER (OUTPATIENT)
Facility: CLINIC | Age: 47
Discharge: HOME | End: 2022-04-12
Attending: COLON & RECTAL SURGERY | Admitting: COLON & RECTAL SURGERY
Payer: COMMERCIAL

## 2022-04-12 VITALS
SYSTOLIC BLOOD PRESSURE: 116 MMHG | WEIGHT: 165 LBS | TEMPERATURE: 97.3 F | RESPIRATION RATE: 16 BRPM | OXYGEN SATURATION: 98 % | HEIGHT: 69 IN | DIASTOLIC BLOOD PRESSURE: 87 MMHG | BODY MASS INDEX: 24.44 KG/M2 | HEART RATE: 78 BPM

## 2022-04-12 LAB
COLONOSCOPY: NORMAL
INR PPP: 1.08 (ref 0.85–1.15)

## 2022-04-12 PROCEDURE — 88305 TISSUE EXAM BY PATHOLOGIST: CPT | Mod: TC | Performed by: COLON & RECTAL SURGERY

## 2022-04-12 PROCEDURE — 85610 PROTHROMBIN TIME: CPT | Performed by: COLON & RECTAL SURGERY

## 2022-04-12 PROCEDURE — 99153 MOD SED SAME PHYS/QHP EA: CPT | Performed by: COLON & RECTAL SURGERY

## 2022-04-12 PROCEDURE — G0500 MOD SEDAT ENDO SERVICE >5YRS: HCPCS | Performed by: COLON & RECTAL SURGERY

## 2022-04-12 PROCEDURE — 36415 COLL VENOUS BLD VENIPUNCTURE: CPT | Performed by: COLON & RECTAL SURGERY

## 2022-04-12 PROCEDURE — 250N000011 HC RX IP 250 OP 636: Performed by: COLON & RECTAL SURGERY

## 2022-04-12 PROCEDURE — 45385 COLONOSCOPY W/LESION REMOVAL: CPT | Performed by: COLON & RECTAL SURGERY

## 2022-04-12 RX ORDER — NALOXONE HYDROCHLORIDE 0.4 MG/ML
0.2 INJECTION, SOLUTION INTRAMUSCULAR; INTRAVENOUS; SUBCUTANEOUS
Status: DISCONTINUED | OUTPATIENT
Start: 2022-04-12 | End: 2022-04-12 | Stop reason: HOSPADM

## 2022-04-12 RX ORDER — DIPHENHYDRAMINE HYDROCHLORIDE 50 MG/ML
25-50 INJECTION INTRAMUSCULAR; INTRAVENOUS
Status: DISCONTINUED | OUTPATIENT
Start: 2022-04-12 | End: 2022-04-12 | Stop reason: HOSPADM

## 2022-04-12 RX ORDER — NALOXONE HYDROCHLORIDE 0.4 MG/ML
0.4 INJECTION, SOLUTION INTRAMUSCULAR; INTRAVENOUS; SUBCUTANEOUS
Status: DISCONTINUED | OUTPATIENT
Start: 2022-04-12 | End: 2022-04-12 | Stop reason: HOSPADM

## 2022-04-12 RX ORDER — PROCHLORPERAZINE MALEATE 10 MG
10 TABLET ORAL EVERY 6 HOURS PRN
Status: DISCONTINUED | OUTPATIENT
Start: 2022-04-12 | End: 2022-04-12 | Stop reason: HOSPADM

## 2022-04-12 RX ORDER — FENTANYL CITRATE 0.05 MG/ML
50-100 INJECTION, SOLUTION INTRAMUSCULAR; INTRAVENOUS EVERY 5 MIN PRN
Status: DISCONTINUED | OUTPATIENT
Start: 2022-04-12 | End: 2022-04-12 | Stop reason: HOSPADM

## 2022-04-12 RX ORDER — ONDANSETRON 2 MG/ML
4 INJECTION INTRAMUSCULAR; INTRAVENOUS EVERY 6 HOURS PRN
Status: DISCONTINUED | OUTPATIENT
Start: 2022-04-12 | End: 2022-04-12 | Stop reason: HOSPADM

## 2022-04-12 RX ORDER — SIMETHICONE 40MG/0.6ML
133 SUSPENSION, DROPS(FINAL DOSAGE FORM)(ML) ORAL
Status: DISCONTINUED | OUTPATIENT
Start: 2022-04-12 | End: 2022-04-12 | Stop reason: HOSPADM

## 2022-04-12 RX ORDER — ONDANSETRON 4 MG/1
4 TABLET, ORALLY DISINTEGRATING ORAL EVERY 6 HOURS PRN
Status: DISCONTINUED | OUTPATIENT
Start: 2022-04-12 | End: 2022-04-12 | Stop reason: HOSPADM

## 2022-04-12 RX ORDER — FLUMAZENIL 0.1 MG/ML
0.2 INJECTION, SOLUTION INTRAVENOUS
Status: DISCONTINUED | OUTPATIENT
Start: 2022-04-12 | End: 2022-04-12 | Stop reason: HOSPADM

## 2022-04-12 RX ORDER — LIDOCAINE 40 MG/G
CREAM TOPICAL
Status: DISCONTINUED | OUTPATIENT
Start: 2022-04-12 | End: 2022-04-12 | Stop reason: HOSPADM

## 2022-04-12 RX ORDER — ONDANSETRON 2 MG/ML
4 INJECTION INTRAMUSCULAR; INTRAVENOUS
Status: DISCONTINUED | OUTPATIENT
Start: 2022-04-12 | End: 2022-04-12 | Stop reason: HOSPADM

## 2022-04-12 RX ORDER — ATROPINE SULFATE 0.4 MG/ML
1 AMPUL (ML) INJECTION
Status: DISCONTINUED | OUTPATIENT
Start: 2022-04-12 | End: 2022-04-12 | Stop reason: HOSPADM

## 2022-04-12 RX ORDER — EPINEPHRINE 1 MG/ML
0.1 INJECTION, SOLUTION INTRAMUSCULAR; SUBCUTANEOUS
Status: DISCONTINUED | OUTPATIENT
Start: 2022-04-12 | End: 2022-04-12 | Stop reason: HOSPADM

## 2022-04-12 RX ADMIN — FENTANYL CITRATE 100 MCG: 0.05 INJECTION, SOLUTION INTRAMUSCULAR; INTRAVENOUS at 11:08

## 2022-04-12 RX ADMIN — MIDAZOLAM 2 MG: 1 INJECTION INTRAMUSCULAR; INTRAVENOUS at 11:08

## 2022-04-12 NOTE — H&P
Pre-Endoscopy History and Physical     Jim Flores MRN# [id number]   YOB: 1975 Age: 46 year old     Date of Procedure: 4/12/2022  Primary care provider: Good Mooney  Type of Endoscopy: colonoscopy  Reason for Procedure:screening  Type of Anesthesia Anticipated: Moderate Sedation    HPI:    Jim is a 46 year old male who will be undergoing the above procedure.      A history and physical has been performed. The patient's medications and allergies have been reviewed. The risks and benefits of the procedure and the sedation options and risks were discussed with the patient.  All questions were answered and informed consent was obtained.      He denies a personal or family history of anesthesia complications or bleeding disorders.     Allergies   Allergen Reactions     Ampicillin Hives     Bactrim Hives     Cefuroxime      Headache, stiff neck and joint pain     Cipro [Ciprofloxacin]      Stiff neck, joint pain, muscle aches     Extract Of Poison Ivy Unknown     Rodolfo Flavor Unknown     rodolfo fruit gives lip swelling, hives     Penicillins Hives and Unknown     Sulfamethoxazole-Trimethoprim Unknown     Toradol Itching        Prior to Admission Medications   Prescriptions Last Dose Informant Patient Reported? Taking?   LORazepam (ATIVAN) 0.5 MG tablet 4/12/2022 at Unknown time  No Yes   Sig: Take 1 tablet (0.5 mg) by mouth every 6 hours as needed for anxiety   NONFORMULARY   Yes Yes   Sig: Take 2 capsules by mouth every morning Methyl Guard Plus   Omega-3 Fatty Acids (FISH OIL) 1200 MG CPDR   Yes Yes   Sig: Take 2,400 Units by mouth daily   Probiotic Product (PROBIOTIC DAILY PO)   Yes Yes   Sig: Take 1 capsule by mouth daily   VITAMIN D, CHOLECALCIFEROL, PO   Yes Yes   Sig: Take 2,000 Units by mouth 2 times daily   fluticasone (FLONASE) 50 MCG/ACT nasal spray   No Yes   Sig: SHAKE LIQUID AND USE 1 TO 2 SPRAYS IN EACH NOSTRIL DAILY   hydrochlorothiazide (HYDRODIURIL) 12.5 MG tablet Past Month at  Unknown time  Yes Yes   Sig: Take 12.5 mg by mouth daily   hydroxychloroquine (PLAQUENIL) 200 MG tablet 4/11/2022 at Unknown time  Yes Yes   Sig: Take 200 mg by mouth 2 times daily.   magnesium oxide (MAG-OX) 400 (241.3 Mg) MG tablet   Yes Yes   Sig: Take 1 tablet (400 mg) by mouth daily   predniSONE (DELTASONE) 5 MG tablet 4/11/2022 at Unknown time  Yes Yes   Sig: Take 1.5 tablets (7.5 mg) by mouth daily   propranolol (INDERAL) 10 MG tablet   Yes Yes   Sig: prn   tamsulosin (FLOMAX) 0.4 MG capsule   No Yes   Sig: TAKE 1 CAPSULE(0.4 MG) BY MOUTH DAILY   venlafaxine (EFFEXOR-ER) 225 MG 24 hr tablet 4/11/2022 at Unknown time  No Yes   Sig: Take 1 tablet (225 mg) by mouth daily   warfarin (COUMADIN) 10 MG tablet   Yes Yes   Sig: TK 1.5 TS D OR AS DIRECTED BASED ON LAB RESULTS      Facility-Administered Medications: None       Patient Active Problem List   Diagnosis     Anxiety state     History of pulmonary embolism     Systemic lupus erythematosus (H)     Chronic rhinitis     Renal stone     GERD (gastroesophageal reflux disease)     Ureterolithiasis     Hyperparathyroidism (H)     Hypercoagulation syndrome (H)        Past Medical History:   Diagnosis Date     Antiphospholipid antibody positive      Anxiety state, unspecified      Hypercalciuria      Nephrolithiasis      Pulmonary infarct (H) 9/1/2008     SLE with normal kidneys (H)         Past Surgical History:   Procedure Laterality Date     BIOPSY  2014, 2021    Mole check - all good     COLONOSCOPY  11/19/2012    Procedure: COLONOSCOPY;  Colonoscopy;  Surgeon: Lupe Ratliff MD;  Location: [facility]     COMBINED CYSTOSCOPY, RETROGRADES, URETEROSCOPY, LASER HOLMIUM LITHOTRIPSY URETER(S), INSERT STENT Left 6/24/2015    Procedure: COMBINED CYSTOSCOPY, RETROGRADES, URETEROSCOPY, LASER HOLMIUM LITHOTRIPSY URETER(S), INSERT STENT;  Surgeon: Ramesh Johnson MD;  Location: [facility]     GENITOURINARY SURGERY  2015 2018    kidney stone lithotripsy     HC REPAIR  "INCISIONAL HERNIA,REDUCIBLE  1991    Hernia Repair, Incisional, Unilateral     HEAD & NECK SURGERY  2018    parathyroidectomy     yumiko stone remov      at [facility] March 12 2018     LASER HOLMIUM LITHOTRIPSY URETER(S), INSERT STENT, COMBINED Right 11/11/2015    Procedure: COMBINED CYSTOSCOPY, URETEROSCOPY, LASER HOLMIUM LITHOTRIPSY URETER(S), INSERT STENT;  Surgeon: Ramesh Johnson MD;  Location: [facility]       Social History     Tobacco Use     Smoking status: Never Smoker     Smokeless tobacco: Never Used   Substance Use Topics     Alcohol use: Yes     Alcohol/week: 0.0 standard drinks     Comment: three per week       Family History   Problem Relation Age of Onset     Cancer Mother         thyroid and uterine     Hyperlipidemia Mother      Other Cancer Mother         Ovarian     Thyroid Disease Mother      Diabetes Father      Anxiety Disorder Father      Prostate Cancer Maternal Grandfather      Arthritis Paternal Grandmother         Type not known     Cardiovascular Paternal Grandfather         CHF     Gastrointestinal Disease Daughter         Celiac disease     Colon Cancer No family hx of        REVIEW OF SYSTEMS:     5 point ROS negative except as noted above in HPI, including Gen., Resp., CV, GI &  system review.      PHYSICAL EXAM:   Ht 1.753 m (5' 9\")   Wt 74.8 kg (165 lb)   BMI 24.37 kg/m   Estimated body mass index is 24.37 kg/m  as calculated from the following:    Height as of this encounter: 1.753 m (5' 9\").    Weight as of this encounter: 74.8 kg (165 lb).   GENERAL APPEARANCE: healthy and alert  MENTAL STATUS: alert  AIRWAY EXAM: Mallampatti Class II (visualization of the soft palate, fauces, and uvula)  RESP: lungs clear to auscultation - no rales, rhonchi or wheezes  CV: regular rates and rhythm      DIAGNOSTICS:    Not indicated      IMPRESSION   ASA Class 2 - Mild systemic disease        PLAN:       Plan for colonoscopy. We discussed the risks, benefits and alternatives and the patient " wished to proceed.    The above has been forwarded to the consulting provider.      Signed Electronically by: Lupe Ratliff MD, MD  April 12, 2022

## 2022-04-12 NOTE — DISCHARGE INSTRUCTIONS
Understanding Colon and Rectal Polyps     The colon has a smooth lining composed of millions of cells.     The colon (also called the large intestine) is a muscular tube that forms the last part of the digestive tract. It absorbs water and stores food waste. The colon is about 4 to 6 feet long. The rectum is the last 6 inches of the colon. The colon and rectum have a smooth lining composed of millions of cells. Changes in these cells can lead to growths in the colon that can become cancerous and should be removed.     When the Colon Lining Changes  Changes that occur in the cells that line the colon or rectum can lead to growths called polyps. Over a period of years, polyps can turn cancerous. Removing polyps early may prevent cancer from ever forming.      Polyps  Polyps are fleshy clumps of tissue that form on the lining of the colon or rectum. Small polyps are usually benign (not cancerous). However, over time, cells in a polyp can change and become cancerous. The larger a polyp grows, the more likely this is to happen. Also, certain types of polyps known as adenomatous polyps are considered premalignant. This means that they will almost always become cancerous if they re not removed.          Cancer  Almost all colorectal cancers start when polyp cells begin growing abnormally. As a cancerous tumor grows, it may involve more and more of the colon or rectum. In time, cancer can also grow beyond the colon or rectum and spread to nearby organs or to glands called lymph nodes. The cells can also travel to other parts of the body. This is known as metastasis. The earlier a cancerous tumor is removed, the better the chance of preventing its spread.        [id number] [facility], [address] [place] [address], [place] [address]. All rights reserved. This information is not intended as a substitute for professional medical care. Always follow your healthcare professional's instructions.

## 2022-04-13 LAB
PATH REPORT.COMMENTS IMP SPEC: NORMAL
PATH REPORT.COMMENTS IMP SPEC: NORMAL
PATH REPORT.FINAL DX SPEC: NORMAL
PATH REPORT.GROSS SPEC: NORMAL
PATH REPORT.MICROSCOPIC SPEC OTHER STN: NORMAL
PATH REPORT.RELEVANT HX SPEC: NORMAL
PHOTO IMAGE: NORMAL

## 2022-04-13 PROCEDURE — 88305 TISSUE EXAM BY PATHOLOGIST: CPT | Mod: 26 | Performed by: PATHOLOGY

## 2022-06-16 ENCOUNTER — TRANSFERRED RECORDS (OUTPATIENT)
Dept: HEALTH INFORMATION MANAGEMENT | Facility: CLINIC | Age: 47
End: 2022-06-16

## 2022-08-29 ENCOUNTER — TRANSFERRED RECORDS (OUTPATIENT)
Dept: PEDIATRICS | Facility: CLINIC | Age: 47
End: 2022-08-29

## 2022-10-24 ENCOUNTER — TRANSFERRED RECORDS (OUTPATIENT)
Dept: PEDIATRICS | Facility: CLINIC | Age: 47
End: 2022-10-24

## 2022-10-24 LAB
ALT SERPL-CCNC: 28 IU/L (ref 5–40)
AST SERPL-CCNC: 30 U/L (ref 5–34)
CREATININE (EXTERNAL): 0.88 MG/DL (ref 0.5–1.3)

## 2022-11-15 ENCOUNTER — LAB (OUTPATIENT)
Dept: LAB | Facility: CLINIC | Age: 47
End: 2022-11-15
Payer: COMMERCIAL

## 2022-11-15 DIAGNOSIS — M32.9 SYSTEMIC LUPUS ERYTHEMATOSUS, UNSPECIFIED SLE TYPE, UNSPECIFIED ORGAN INVOLVEMENT STATUS (H): ICD-10-CM

## 2022-11-15 DIAGNOSIS — Z13.6 CARDIOVASCULAR SCREENING; LDL GOAL LESS THAN 130: ICD-10-CM

## 2022-11-15 DIAGNOSIS — E21.3 HYPERPARATHYROIDISM (H): ICD-10-CM

## 2022-11-15 LAB
ALBUMIN SERPL BCG-MCNC: 4.2 G/DL (ref 3.5–5.2)
ALP SERPL-CCNC: 47 U/L (ref 40–129)
ALT SERPL W P-5'-P-CCNC: 28 U/L (ref 10–50)
ANION GAP SERPL CALCULATED.3IONS-SCNC: 8 MMOL/L (ref 7–15)
AST SERPL W P-5'-P-CCNC: 30 U/L (ref 10–50)
BILIRUB SERPL-MCNC: 0.2 MG/DL
BUN SERPL-MCNC: 15 MG/DL (ref 6–20)
CALCIUM SERPL-MCNC: 9.1 MG/DL (ref 8.6–10)
CHLORIDE SERPL-SCNC: 107 MMOL/L (ref 98–107)
CHOLEST SERPL-MCNC: 210 MG/DL
CREAT SERPL-MCNC: 0.99 MG/DL (ref 0.67–1.17)
DEPRECATED HCO3 PLAS-SCNC: 28 MMOL/L (ref 22–29)
GFR SERPL CREATININE-BSD FRML MDRD: >90 ML/MIN/1.73M2
GLUCOSE SERPL-MCNC: 87 MG/DL (ref 70–99)
HDLC SERPL-MCNC: 46 MG/DL
LDLC SERPL CALC-MCNC: 146 MG/DL
NONHDLC SERPL-MCNC: 164 MG/DL
PHOSPHATE SERPL-MCNC: 2.7 MG/DL (ref 2.5–4.5)
POTASSIUM SERPL-SCNC: 4.3 MMOL/L (ref 3.4–5.3)
PROT SERPL-MCNC: 6.4 G/DL (ref 6.4–8.3)
PTH-INTACT SERPL-MCNC: 41 PG/ML (ref 15–65)
SODIUM SERPL-SCNC: 143 MMOL/L (ref 136–145)
TRIGL SERPL-MCNC: 90 MG/DL

## 2022-11-15 PROCEDURE — 80053 COMPREHEN METABOLIC PANEL: CPT

## 2022-11-15 PROCEDURE — 82172 ASSAY OF APOLIPOPROTEIN: CPT | Mod: 90

## 2022-11-15 PROCEDURE — 82306 VITAMIN D 25 HYDROXY: CPT

## 2022-11-15 PROCEDURE — 84100 ASSAY OF PHOSPHORUS: CPT

## 2022-11-15 PROCEDURE — 83970 ASSAY OF PARATHORMONE: CPT

## 2022-11-15 PROCEDURE — 99000 SPECIMEN HANDLING OFFICE-LAB: CPT

## 2022-11-15 PROCEDURE — 83090 ASSAY OF HOMOCYSTEINE: CPT

## 2022-11-15 PROCEDURE — 36415 COLL VENOUS BLD VENIPUNCTURE: CPT

## 2022-11-15 PROCEDURE — 80061 LIPID PANEL: CPT

## 2022-11-16 LAB — DEPRECATED CALCIDIOL+CALCIFEROL SERPL-MC: 29 UG/L (ref 20–75)

## 2022-11-17 LAB
APO B100 SERPL-MCNC: 96 MG/DL
HCYS SERPL-SCNC: 7.8 UMOL/L (ref 4–12)

## 2022-11-19 SDOH — HEALTH STABILITY: PHYSICAL HEALTH: ON AVERAGE, HOW MANY MINUTES DO YOU ENGAGE IN EXERCISE AT THIS LEVEL?: 20 MIN

## 2022-11-19 SDOH — ECONOMIC STABILITY: INCOME INSECURITY: IN THE LAST 12 MONTHS, WAS THERE A TIME WHEN YOU WERE NOT ABLE TO PAY THE MORTGAGE OR RENT ON TIME?: NO

## 2022-11-19 SDOH — ECONOMIC STABILITY: FOOD INSECURITY: WITHIN THE PAST 12 MONTHS, THE FOOD YOU BOUGHT JUST DIDN'T LAST AND YOU DIDN'T HAVE MONEY TO GET MORE.: NEVER TRUE

## 2022-11-19 SDOH — ECONOMIC STABILITY: INCOME INSECURITY: HOW HARD IS IT FOR YOU TO PAY FOR THE VERY BASICS LIKE FOOD, HOUSING, MEDICAL CARE, AND HEATING?: NOT VERY HARD

## 2022-11-19 SDOH — HEALTH STABILITY: PHYSICAL HEALTH: ON AVERAGE, HOW MANY DAYS PER WEEK DO YOU ENGAGE IN MODERATE TO STRENUOUS EXERCISE (LIKE A BRISK WALK)?: 1 DAY

## 2022-11-19 SDOH — ECONOMIC STABILITY: FOOD INSECURITY: WITHIN THE PAST 12 MONTHS, YOU WORRIED THAT YOUR FOOD WOULD RUN OUT BEFORE YOU GOT MONEY TO BUY MORE.: NEVER TRUE

## 2022-11-19 ASSESSMENT — ENCOUNTER SYMPTOMS
ABDOMINAL PAIN: 0
NERVOUS/ANXIOUS: 1
COUGH: 0
SORE THROAT: 0
FREQUENCY: 0
PALPITATIONS: 0
NAUSEA: 0
DIARRHEA: 0
CONSTIPATION: 0
DYSURIA: 0
CHILLS: 0
PARESTHESIAS: 0
EYE PAIN: 0
HEADACHES: 1
HEARTBURN: 0
ARTHRALGIAS: 1
SHORTNESS OF BREATH: 0
DIZZINESS: 0
JOINT SWELLING: 0
FEVER: 0
WEAKNESS: 0
HEMATOCHEZIA: 1
MYALGIAS: 0
HEMATURIA: 1

## 2022-11-19 ASSESSMENT — SOCIAL DETERMINANTS OF HEALTH (SDOH)
HOW OFTEN DO YOU GET TOGETHER WITH FRIENDS OR RELATIVES?: ONCE A WEEK
HOW OFTEN DO YOU ATTEND CHURCH OR RELIGIOUS SERVICES?: NEVER
DO YOU BELONG TO ANY CLUBS OR ORGANIZATIONS SUCH AS CHURCH GROUPS UNIONS, FRATERNAL OR ATHLETIC GROUPS, OR SCHOOL GROUPS?: NO
IN A TYPICAL WEEK, HOW MANY TIMES DO YOU TALK ON THE PHONE WITH FAMILY, FRIENDS, OR NEIGHBORS?: TWICE A WEEK

## 2022-11-19 ASSESSMENT — LIFESTYLE VARIABLES
AUDIT-C TOTAL SCORE: 2
HOW MANY STANDARD DRINKS CONTAINING ALCOHOL DO YOU HAVE ON A TYPICAL DAY: 1 OR 2
HOW OFTEN DO YOU HAVE A DRINK CONTAINING ALCOHOL: 2-4 TIMES A MONTH
SKIP TO QUESTIONS 9-10: 1
HOW OFTEN DO YOU HAVE SIX OR MORE DRINKS ON ONE OCCASION: NEVER

## 2022-11-21 ENCOUNTER — HEALTH MAINTENANCE LETTER (OUTPATIENT)
Age: 47
End: 2022-11-21

## 2022-11-21 ENCOUNTER — OFFICE VISIT (OUTPATIENT)
Dept: PEDIATRICS | Facility: CLINIC | Age: 47
End: 2022-11-21
Payer: COMMERCIAL

## 2022-11-21 VITALS
HEIGHT: 69 IN | OXYGEN SATURATION: 98 % | RESPIRATION RATE: 16 BRPM | WEIGHT: 172.6 LBS | HEART RATE: 84 BPM | DIASTOLIC BLOOD PRESSURE: 84 MMHG | BODY MASS INDEX: 25.56 KG/M2 | SYSTOLIC BLOOD PRESSURE: 122 MMHG | TEMPERATURE: 97.6 F

## 2022-11-21 DIAGNOSIS — J31.0 CHRONIC RHINITIS: ICD-10-CM

## 2022-11-21 DIAGNOSIS — E21.3 HYPERPARATHYROIDISM (H): ICD-10-CM

## 2022-11-21 DIAGNOSIS — E78.5 HYPERLIPIDEMIA LDL GOAL <130: ICD-10-CM

## 2022-11-21 DIAGNOSIS — N20.0 KIDNEY STONE: ICD-10-CM

## 2022-11-21 DIAGNOSIS — M32.9 SYSTEMIC LUPUS ERYTHEMATOSUS, UNSPECIFIED SLE TYPE, UNSPECIFIED ORGAN INVOLVEMENT STATUS (H): ICD-10-CM

## 2022-11-21 DIAGNOSIS — D68.59 HYPERCOAGULATION SYNDROME (H): ICD-10-CM

## 2022-11-21 DIAGNOSIS — F41.1 ANXIETY STATE: ICD-10-CM

## 2022-11-21 DIAGNOSIS — Z00.00 ROUTINE GENERAL MEDICAL EXAMINATION AT A HEALTH CARE FACILITY: Primary | ICD-10-CM

## 2022-11-21 PROCEDURE — 99396 PREV VISIT EST AGE 40-64: CPT | Performed by: INTERNAL MEDICINE

## 2022-11-21 RX ORDER — TAMSULOSIN HYDROCHLORIDE 0.4 MG/1
0.4 CAPSULE ORAL DAILY PRN
Qty: 30 CAPSULE | Refills: 5 | Status: ON HOLD | OUTPATIENT
Start: 2022-11-21 | End: 2023-09-07

## 2022-11-21 RX ORDER — LORAZEPAM 0.5 MG/1
0.5 TABLET ORAL EVERY 6 HOURS PRN
Qty: 20 TABLET | Refills: 0 | Status: SHIPPED | OUTPATIENT
Start: 2022-11-21 | End: 2024-09-04

## 2022-11-21 RX ORDER — ATORVASTATIN CALCIUM 10 MG/1
10 TABLET, FILM COATED ORAL DAILY
Qty: 90 TABLET | Refills: 4 | Status: SHIPPED | OUTPATIENT
Start: 2022-11-21 | End: 2023-12-15

## 2022-11-21 RX ORDER — PROPRANOLOL HYDROCHLORIDE 10 MG/1
10 TABLET ORAL 2 TIMES DAILY PRN
Qty: 30 TABLET | Refills: 2 | Status: SHIPPED | OUTPATIENT
Start: 2022-11-21 | End: 2023-12-15

## 2022-11-21 RX ORDER — FLUTICASONE PROPIONATE 50 MCG
1-2 SPRAY, SUSPENSION (ML) NASAL DAILY
Qty: 48 G | Refills: 3 | Status: ON HOLD | OUTPATIENT
Start: 2022-11-21 | End: 2023-09-07

## 2022-11-21 ASSESSMENT — ENCOUNTER SYMPTOMS
NERVOUS/ANXIOUS: 1
CONSTIPATION: 0
NAUSEA: 0
ARTHRALGIAS: 1
DIZZINESS: 0
EYE PAIN: 0
JOINT SWELLING: 0
HEMATOCHEZIA: 1
COUGH: 0
DIARRHEA: 0
HEADACHES: 1
FEVER: 0
PALPITATIONS: 0
MYALGIAS: 0
ABDOMINAL PAIN: 0
FREQUENCY: 0
HEMATURIA: 1
PARESTHESIAS: 0
SHORTNESS OF BREATH: 0
SORE THROAT: 0
HEARTBURN: 0
DYSURIA: 0
CHILLS: 0
WEAKNESS: 0

## 2022-11-21 ASSESSMENT — PAIN SCALES - GENERAL: PAINLEVEL: NO PAIN (0)

## 2022-11-21 NOTE — PROGRESS NOTES
SUBJECTIVE:   CC: Jim is an 47 year old who presents for preventative health visit.     Healthy Habits:     Getting at least 3 servings of Calcium per day:  Yes    Bi-annual eye exam:  Yes    Dental care twice a year:  Yes    Sleep apnea or symptoms of sleep apnea:  None    Diet:  Low salt, Gluten-free/reduced and Breakfast skipped    Frequency of exercise:  1 day/week    Duration of exercise:  Less than 15 minutes    Taking medications regularly:  Yes    Medication side effects:  None    PHQ-2 Total Score: 0    Additional concerns today:  No    Here for CPE.  Overall feeling well.    Reviewed chronic health issues.    Anxiety. Intermittent sx. Treats with alprazolam prn. Last rx for #20 from March.   Has propranolol he uses prn prior to public speaking which also works well for him.  Takes venlafaxine 225 mg daily as well.    Kidney stones. No active sx at this time. Has tamsulosin he takes prn.     SLE w/ hypercoagulable state. Latter treated w/ warfarin. Reviewed other routine medications, all managed by rheumatology.     Hyperlipidemia. Not currently treated. Is interested in starting medication therapy. Options discussed.   Lab Results   Component Value Date     11/15/2022     09/20/2021     12/11/2020     07/19/2019                 Today's PHQ-2 Score:   PHQ-2 ( 1999 Pfizer) 11/19/2022   Q1: Little interest or pleasure in doing things 0   Q2: Feeling down, depressed or hopeless 0   PHQ-2 Score 0   PHQ-2 Total Score (12-17 Years)- Positive if 3 or more points; Administer PHQ-A if positive -   Q1: Little interest or pleasure in doing things Not at all   Q2: Feeling down, depressed or hopeless Not at all   PHQ-2 Score 0           Social History     Tobacco Use     Smoking status: Never     Smokeless tobacco: Never   Substance Use Topics     Alcohol use: Yes     Alcohol/week: 0.0 standard drinks     Comment: three per week         Alcohol Use 11/19/2022   Prescreen: >3 drinks/day or  ">7 drinks/week? No   Prescreen: >3 drinks/day or >7 drinks/week? -   AUDIT SCORE  -       Last PSA:   PSA   Date Value Ref Range Status   08/23/2008 0.75 0 - 4 ug/L Final       Reviewed orders with patient. Reviewed health maintenance and updated orders accordingly - Yes      Review of Systems   Constitutional: Negative for chills and fever.   HENT: Positive for congestion. Negative for ear pain, hearing loss and sore throat.    Eyes: Negative for pain and visual disturbance.   Respiratory: Negative for cough and shortness of breath.    Cardiovascular: Negative for chest pain, palpitations and peripheral edema.   Gastrointestinal: Positive for hematochezia. Negative for abdominal pain, constipation, diarrhea, heartburn and nausea.   Genitourinary: Positive for hematuria. Negative for dysuria, frequency, genital sores, impotence, penile discharge and urgency.   Musculoskeletal: Positive for arthralgias. Negative for joint swelling and myalgias.   Skin: Positive for rash.   Neurological: Positive for headaches. Negative for dizziness, weakness and paresthesias.   Psychiatric/Behavioral: Negative for mood changes. The patient is nervous/anxious.          OBJECTIVE:   /84 (BP Location: Right arm, Patient Position: Chair, Cuff Size: Adult Regular)   Pulse 84   Temp 97.6  F (36.4  C) (Tympanic)   Resp 16   Ht 1.753 m (5' 9\")   Wt 78.3 kg (172 lb 9.6 oz)   SpO2 98%   BMI 25.49 kg/m      Physical Exam  GENERAL: healthy, alert and no distress  EYES: PERRL, EOMI  HENT: ear canals and TM's normal  NECK: no adenopathy  RESP: lungs clear to auscultation - no rales, rhonchi or wheezes  CV: regular rate and rhythm, normal S1 S2, no murmur, no peripheral edema and peripheral pulses strong  ABDOMEN: soft, nontender, bowel sounds normal  MS: no gross musculoskeletal defects noted, no edema  SKIN: no suspicious lesions or rashes  NEURO: Normal strength and tone  PSYCH: mentation appears normal, affect normal/bright "       ASSESSMENT/PLAN:       ICD-10-CM    1. Routine general medical examination at a health care facility  Z00.00       2. Anxiety state  F41.1 propranolol (INDERAL) 10 MG tablet     LORazepam (ATIVAN) 0.5 MG tablet      3. Chronic rhinitis  J31.0 fluticasone (FLONASE) 50 MCG/ACT nasal spray      4. Kidney stone  N20.0 tamsulosin (FLOMAX) 0.4 MG capsule      5. Hyperlipidemia LDL goal <130  E78.5 atorvastatin (LIPITOR) 10 MG tablet     Lipid panel reflex to direct LDL Fasting     ALT      6. Systemic lupus erythematosus, unspecified SLE type, unspecified organ involvement status (H)  M32.9       7. Hyperparathyroidism (H)  E21.3       8. Hypercoagulation syndrome (H)  D68.59         Refilled routine medications as needed.  Start atorvastatin 10 mg daily. Recheck labs in 2-3 months.   Continue w/ specialist visits as he has been doing.       COUNSELING:   Reviewed preventive health counseling, as reflected in patient instructions        He reports that he has never smoked. He has never used smokeless tobacco.        Good Mooney MD  [facility]

## 2022-11-28 ENCOUNTER — TRANSFERRED RECORDS (OUTPATIENT)
Dept: HEALTH INFORMATION MANAGEMENT | Facility: CLINIC | Age: 47
End: 2022-11-28

## 2023-01-04 ENCOUNTER — LAB (OUTPATIENT)
Dept: LAB | Facility: CLINIC | Age: 48
End: 2023-01-04
Payer: COMMERCIAL

## 2023-01-04 DIAGNOSIS — M32.9 SYSTEMIC LUPUS ERYTHEMATOSUS, UNSPECIFIED SLE TYPE, UNSPECIFIED ORGAN INVOLVEMENT STATUS (H): ICD-10-CM

## 2023-01-04 DIAGNOSIS — M32.9 SYSTEMIC LUPUS ERYTHEMATOSUS, UNSPECIFIED SLE TYPE, UNSPECIFIED ORGAN INVOLVEMENT STATUS (H): Primary | ICD-10-CM

## 2023-01-04 DIAGNOSIS — E78.5 HYPERLIPIDEMIA LDL GOAL <130: ICD-10-CM

## 2023-01-04 LAB
ALBUMIN SERPL BCG-MCNC: 4.5 G/DL (ref 3.5–5.2)
ALP SERPL-CCNC: 48 U/L (ref 40–129)
ALT SERPL W P-5'-P-CCNC: 39 U/L (ref 10–50)
ANION GAP SERPL CALCULATED.3IONS-SCNC: 15 MMOL/L (ref 7–15)
AST SERPL W P-5'-P-CCNC: 36 U/L (ref 10–50)
BASOPHILS # BLD AUTO: 0 10E3/UL (ref 0–0.2)
BASOPHILS NFR BLD AUTO: 0 %
BILIRUB SERPL-MCNC: 0.4 MG/DL
BUN SERPL-MCNC: 13.3 MG/DL (ref 6–20)
CALCIUM SERPL-MCNC: 9.3 MG/DL (ref 8.6–10)
CHLORIDE SERPL-SCNC: 103 MMOL/L (ref 98–107)
CHOLEST SERPL-MCNC: 262 MG/DL
CREAT SERPL-MCNC: 0.99 MG/DL (ref 0.67–1.17)
DEPRECATED HCO3 PLAS-SCNC: 21 MMOL/L (ref 22–29)
EOSINOPHIL # BLD AUTO: 0.1 10E3/UL (ref 0–0.7)
EOSINOPHIL NFR BLD AUTO: 1 %
ERYTHROCYTE [DISTWIDTH] IN BLOOD BY AUTOMATED COUNT: 14.9 % (ref 10–15)
GFR SERPL CREATININE-BSD FRML MDRD: >90 ML/MIN/1.73M2
GLUCOSE SERPL-MCNC: 81 MG/DL (ref 70–99)
HCT VFR BLD AUTO: 46.4 % (ref 40–53)
HDLC SERPL-MCNC: 59 MG/DL
HGB BLD-MCNC: 15.4 G/DL (ref 13.3–17.7)
LDLC SERPL CALC-MCNC: 185 MG/DL
LYMPHOCYTES # BLD AUTO: 0.4 10E3/UL (ref 0.8–5.3)
LYMPHOCYTES NFR BLD AUTO: 8 %
MCH RBC QN AUTO: 28 PG (ref 26.5–33)
MCHC RBC AUTO-ENTMCNC: 33.2 G/DL (ref 31.5–36.5)
MCV RBC AUTO: 84 FL (ref 78–100)
MONOCYTES # BLD AUTO: 0.5 10E3/UL (ref 0–1.3)
MONOCYTES NFR BLD AUTO: 9 %
NEUTROPHILS # BLD AUTO: 4.5 10E3/UL (ref 1.6–8.3)
NEUTROPHILS NFR BLD AUTO: 81 %
NONHDLC SERPL-MCNC: 203 MG/DL
PLATELET # BLD AUTO: 188 10E3/UL (ref 150–450)
POTASSIUM SERPL-SCNC: 4.2 MMOL/L (ref 3.4–5.3)
PROT SERPL-MCNC: 6.7 G/DL (ref 6.4–8.3)
RBC # BLD AUTO: 5.5 10E6/UL (ref 4.4–5.9)
SODIUM SERPL-SCNC: 139 MMOL/L (ref 136–145)
TRIGL SERPL-MCNC: 90 MG/DL
WBC # BLD AUTO: 5.5 10E3/UL (ref 4–11)

## 2023-01-04 PROCEDURE — 80061 LIPID PANEL: CPT

## 2023-01-04 PROCEDURE — 80053 COMPREHEN METABOLIC PANEL: CPT

## 2023-01-04 PROCEDURE — 36415 COLL VENOUS BLD VENIPUNCTURE: CPT

## 2023-01-04 PROCEDURE — 85025 COMPLETE CBC W/AUTO DIFF WBC: CPT

## 2023-02-13 ENCOUNTER — E-VISIT (OUTPATIENT)
Dept: PEDIATRICS | Facility: CLINIC | Age: 48
End: 2023-02-13
Payer: COMMERCIAL

## 2023-02-13 DIAGNOSIS — F41.1 ANXIETY STATE: Primary | ICD-10-CM

## 2023-02-13 PROCEDURE — 99421 OL DIG E/M SVC 5-10 MIN: CPT | Performed by: INTERNAL MEDICINE

## 2023-02-13 RX ORDER — VENLAFAXINE HYDROCHLORIDE 150 MG/1
300 CAPSULE, EXTENDED RELEASE ORAL DAILY
Qty: 180 CAPSULE | Refills: 1 | Status: SHIPPED | OUTPATIENT
Start: 2023-02-13 | End: 2023-08-15

## 2023-02-13 ASSESSMENT — ANXIETY QUESTIONNAIRES
7. FEELING AFRAID AS IF SOMETHING AWFUL MIGHT HAPPEN: SEVERAL DAYS
3. WORRYING TOO MUCH ABOUT DIFFERENT THINGS: MORE THAN HALF THE DAYS
1. FEELING NERVOUS, ANXIOUS, OR ON EDGE: MORE THAN HALF THE DAYS
5. BEING SO RESTLESS THAT IT IS HARD TO SIT STILL: NOT AT ALL
6. BECOMING EASILY ANNOYED OR IRRITABLE: SEVERAL DAYS
8. IF YOU CHECKED OFF ANY PROBLEMS, HOW DIFFICULT HAVE THESE MADE IT FOR YOU TO DO YOUR WORK, TAKE CARE OF THINGS AT HOME, OR GET ALONG WITH OTHER PEOPLE?: SOMEWHAT DIFFICULT
GAD7 TOTAL SCORE: 9
GAD7 TOTAL SCORE: 9
4. TROUBLE RELAXING: SEVERAL DAYS
7. FEELING AFRAID AS IF SOMETHING AWFUL MIGHT HAPPEN: SEVERAL DAYS
GAD7 TOTAL SCORE: 9
2. NOT BEING ABLE TO STOP OR CONTROL WORRYING: MORE THAN HALF THE DAYS

## 2023-02-13 NOTE — TELEPHONE ENCOUNTER
Provider E-Visit time total (minutes): 5    PHQ 3/25/2016 4/20/2018   PHQ-9 Total Score 2 3   Q9: Thoughts of better off dead/self-harm past 2 weeks Not at all Not at all     CALVIN-7 SCORE 3/25/2016 4/20/2018 2/13/2023   Total Score - - 9 (mild anxiety)   Total Score 3 1 9

## 2023-02-14 ASSESSMENT — ANXIETY QUESTIONNAIRES: GAD7 TOTAL SCORE: 9

## 2023-02-24 ENCOUNTER — TRANSFERRED RECORDS (OUTPATIENT)
Dept: HEALTH INFORMATION MANAGEMENT | Facility: CLINIC | Age: 48
End: 2023-02-24
Payer: COMMERCIAL

## 2023-03-01 ENCOUNTER — TRANSFERRED RECORDS (OUTPATIENT)
Dept: URGENT CARE | Facility: CLINIC | Age: 48
End: 2023-03-01
Payer: COMMERCIAL

## 2023-03-01 LAB
ALT SERPL-CCNC: 40 IU/L (ref 5–40)
AST SERPL-CCNC: 31 U/L (ref 5–34)
CREATININE (EXTERNAL): 0.85 MG/DL (ref 0.5–1.3)

## 2023-04-08 ENCOUNTER — MYC MEDICAL ADVICE (OUTPATIENT)
Dept: PEDIATRICS | Facility: CLINIC | Age: 48
End: 2023-04-08
Payer: COMMERCIAL

## 2023-06-07 ENCOUNTER — MYC MEDICAL ADVICE (OUTPATIENT)
Dept: PEDIATRICS | Facility: CLINIC | Age: 48
End: 2023-06-07
Payer: COMMERCIAL

## 2023-06-07 DIAGNOSIS — E78.5 HYPERLIPIDEMIA LDL GOAL <130: Primary | ICD-10-CM

## 2023-06-07 NOTE — TELEPHONE ENCOUNTER
Patient requesting fasting lipid check. Per visit from 11/21/2022:    Start atorvastatin 10 mg daily. Recheck labs in 2-3 months.     Please sign pended lab order and route back so we can let patient know. Thanks!    KRISTAL Jacques on 6/7/2023 at 2:08 PM

## 2023-06-08 DIAGNOSIS — M32.9 SYSTEMIC LUPUS ERYTHEMATOSUS (H): Primary | ICD-10-CM

## 2023-06-08 DIAGNOSIS — M35.00 SJOGREN'S SYNDROME (H): ICD-10-CM

## 2023-06-13 ENCOUNTER — LAB (OUTPATIENT)
Dept: LAB | Facility: CLINIC | Age: 48
End: 2023-06-13
Payer: COMMERCIAL

## 2023-06-13 DIAGNOSIS — M35.00 SJOGREN'S SYNDROME (H): ICD-10-CM

## 2023-06-13 DIAGNOSIS — E78.5 HYPERLIPIDEMIA LDL GOAL <130: ICD-10-CM

## 2023-06-13 DIAGNOSIS — M32.9 SYSTEMIC LUPUS ERYTHEMATOSUS (H): Primary | ICD-10-CM

## 2023-06-13 LAB
ALBUMIN SERPL BCG-MCNC: 4.3 G/DL (ref 3.5–5.2)
ALBUMIN UR-MCNC: 30 MG/DL
ALP SERPL-CCNC: 43 U/L (ref 40–129)
ALT SERPL W P-5'-P-CCNC: 54 U/L (ref 0–70)
ANION GAP SERPL CALCULATED.3IONS-SCNC: 8 MMOL/L (ref 7–15)
APPEARANCE UR: CLEAR
AST SERPL W P-5'-P-CCNC: 39 U/L (ref 0–45)
BASOPHILS # BLD AUTO: 0.1 10E3/UL (ref 0–0.2)
BASOPHILS NFR BLD AUTO: 1 %
BILIRUB SERPL-MCNC: 0.2 MG/DL
BILIRUB UR QL STRIP: NEGATIVE
BUN SERPL-MCNC: 11.8 MG/DL (ref 6–20)
CALCIUM SERPL-MCNC: 9 MG/DL (ref 8.6–10)
CHLORIDE SERPL-SCNC: 108 MMOL/L (ref 98–107)
CHOLEST SERPL-MCNC: 144 MG/DL
COLOR UR AUTO: YELLOW
CREAT SERPL-MCNC: 0.98 MG/DL (ref 0.67–1.17)
DEPRECATED HCO3 PLAS-SCNC: 26 MMOL/L (ref 22–29)
EOSINOPHIL # BLD AUTO: 0.2 10E3/UL (ref 0–0.7)
EOSINOPHIL NFR BLD AUTO: 4 %
ERYTHROCYTE [DISTWIDTH] IN BLOOD BY AUTOMATED COUNT: 13.5 % (ref 10–15)
GFR SERPL CREATININE-BSD FRML MDRD: >90 ML/MIN/1.73M2
GLUCOSE SERPL-MCNC: 80 MG/DL (ref 70–99)
GLUCOSE UR STRIP-MCNC: NEGATIVE MG/DL
HCT VFR BLD AUTO: 46.1 % (ref 40–53)
HDLC SERPL-MCNC: 57 MG/DL
HGB BLD-MCNC: 14.9 G/DL (ref 13.3–17.7)
HGB UR QL STRIP: ABNORMAL
IMM GRANULOCYTES # BLD: 0 10E3/UL
IMM GRANULOCYTES NFR BLD: 0 %
KETONES UR STRIP-MCNC: NEGATIVE MG/DL
LDLC SERPL CALC-MCNC: 71 MG/DL
LEUKOCYTE ESTERASE UR QL STRIP: NEGATIVE
LYMPHOCYTES # BLD AUTO: 1 10E3/UL (ref 0.8–5.3)
LYMPHOCYTES NFR BLD AUTO: 21 %
MCH RBC QN AUTO: 28.5 PG (ref 26.5–33)
MCHC RBC AUTO-ENTMCNC: 32.3 G/DL (ref 31.5–36.5)
MCV RBC AUTO: 88 FL (ref 78–100)
MONOCYTES # BLD AUTO: 0.5 10E3/UL (ref 0–1.3)
MONOCYTES NFR BLD AUTO: 10 %
NEUTROPHILS # BLD AUTO: 2.9 10E3/UL (ref 1.6–8.3)
NEUTROPHILS NFR BLD AUTO: 64 %
NITRATE UR QL: NEGATIVE
NONHDLC SERPL-MCNC: 87 MG/DL
PH UR STRIP: 5.5 [PH] (ref 5–7)
PLATELET # BLD AUTO: 175 10E3/UL (ref 150–450)
POTASSIUM SERPL-SCNC: 4 MMOL/L (ref 3.4–5.3)
PROT SERPL-MCNC: 6.3 G/DL (ref 6.4–8.3)
RBC # BLD AUTO: 5.23 10E6/UL (ref 4.4–5.9)
SODIUM SERPL-SCNC: 142 MMOL/L (ref 136–145)
SP GR UR STRIP: >=1.03 (ref 1–1.03)
TOTAL PROTEIN SERUM FOR ELP: 6.2 G/DL (ref 6.4–8.3)
TRIGL SERPL-MCNC: 81 MG/DL
UROBILINOGEN UR STRIP-ACNC: 0.2 E.U./DL
WBC # BLD AUTO: 4.5 10E3/UL (ref 4–11)

## 2023-06-13 PROCEDURE — 80061 LIPID PANEL: CPT

## 2023-06-13 PROCEDURE — 83516 IMMUNOASSAY NONANTIBODY: CPT | Mod: 90

## 2023-06-13 PROCEDURE — 99000 SPECIMEN HANDLING OFFICE-LAB: CPT

## 2023-06-13 PROCEDURE — 86038 ANTINUCLEAR ANTIBODIES: CPT

## 2023-06-13 PROCEDURE — 86235 NUCLEAR ANTIGEN ANTIBODY: CPT

## 2023-06-13 PROCEDURE — 84155 ASSAY OF PROTEIN SERUM: CPT

## 2023-06-13 PROCEDURE — 86160 COMPLEMENT ANTIGEN: CPT

## 2023-06-13 PROCEDURE — 85025 COMPLETE CBC W/AUTO DIFF WBC: CPT

## 2023-06-13 PROCEDURE — 81003 URINALYSIS AUTO W/O SCOPE: CPT

## 2023-06-13 PROCEDURE — 86039 ANTINUCLEAR ANTIBODIES (ANA): CPT

## 2023-06-13 PROCEDURE — 80053 COMPREHEN METABOLIC PANEL: CPT

## 2023-06-13 PROCEDURE — 36415 COLL VENOUS BLD VENIPUNCTURE: CPT

## 2023-06-13 PROCEDURE — 84165 PROTEIN E-PHORESIS SERUM: CPT | Performed by: PATHOLOGY

## 2023-06-13 PROCEDURE — 86225 DNA ANTIBODY NATIVE: CPT

## 2023-06-13 PROCEDURE — 86235 NUCLEAR ANTIGEN ANTIBODY: CPT | Mod: 59

## 2023-06-13 PROCEDURE — 84156 ASSAY OF PROTEIN URINE: CPT

## 2023-06-14 LAB
ALBUMIN MFR UR ELPH: 17.1 MG/DL
ANA PAT SER IF-IMP: ABNORMAL
ANA SER QL IF: POSITIVE
ANA TITR SER IF: ABNORMAL {TITER}
C3 SERPL-MCNC: 106 MG/DL (ref 81–157)
C4 SERPL-MCNC: 21 MG/DL (ref 13–39)
CREAT UR-MCNC: 136 MG/DL
DSDNA AB SER-ACNC: <0.6 IU/ML
ENA SS-B IGG SER IA-ACNC: 207 U/ML
ENA SS-B IGG SER IA-ACNC: POSITIVE
Lab: NORMAL
PERFORMING LABORATORY: NORMAL
PROT/CREAT 24H UR: 0.13 MG/MG CR (ref 0–0.2)
SPECIMEN STATUS: NORMAL
TEST NAME: NORMAL

## 2023-06-15 LAB
ALBUMIN SERPL ELPH-MCNC: 4 G/DL (ref 3.7–5.1)
ALPHA1 GLOB SERPL ELPH-MCNC: 0.3 G/DL (ref 0.2–0.4)
ALPHA2 GLOB SERPL ELPH-MCNC: 0.6 G/DL (ref 0.5–0.9)
B-GLOBULIN SERPL ELPH-MCNC: 0.7 G/DL (ref 0.6–1)
GAMMA GLOB SERPL ELPH-MCNC: 0.7 G/DL (ref 0.7–1.6)
M PROTEIN SERPL ELPH-MCNC: 0 G/DL
MISCELLANEOUS TEST 1 (ARUP): NORMAL
PROT PATTERN SERPL ELPH-IMP: NORMAL
RNAP III IGG SERPL IA-ACNC: 1 UNITS

## 2023-06-16 LAB
CENTROMERE B AB SER-ACNC: <0.7 U/ML
CENTROMERE B AB SER-ACNC: NEGATIVE
ENA SCL70 IGG SER IA-ACNC: <0.6 U/ML
ENA SCL70 IGG SER IA-ACNC: NEGATIVE
ENA SM IGG SER IA-ACNC: <0.7 U/ML
ENA SM IGG SER IA-ACNC: NEGATIVE
ENA SS-A AB SER IA-ACNC: >240 U/ML
ENA SS-A AB SER IA-ACNC: POSITIVE
U1 SNRNP IGG SER IA-ACNC: <1.1 U/ML
U1 SNRNP IGG SER IA-ACNC: NEGATIVE

## 2023-07-07 ENCOUNTER — HOSPITAL ENCOUNTER (EMERGENCY)
Facility: CLINIC | Age: 48
Discharge: HOME | End: 2023-07-07
Attending: EMERGENCY MEDICINE | Admitting: EMERGENCY MEDICINE
Payer: COMMERCIAL

## 2023-07-07 ENCOUNTER — APPOINTMENT (OUTPATIENT)
Dept: CT IMAGING | Facility: CLINIC | Age: 48
End: 2023-07-07
Attending: EMERGENCY MEDICINE
Payer: COMMERCIAL

## 2023-07-07 VITALS
DIASTOLIC BLOOD PRESSURE: 106 MMHG | TEMPERATURE: 97.7 F | HEART RATE: 84 BPM | RESPIRATION RATE: 18 BRPM | SYSTOLIC BLOOD PRESSURE: 157 MMHG | OXYGEN SATURATION: 98 %

## 2023-07-07 DIAGNOSIS — N23 RENAL COLIC ON LEFT SIDE: ICD-10-CM

## 2023-07-07 LAB
ALBUMIN SERPL BCG-MCNC: 4.7 G/DL (ref 3.5–5.2)
ALBUMIN UR-MCNC: NEGATIVE MG/DL
ALP SERPL-CCNC: 45 U/L (ref 40–129)
ALT SERPL W P-5'-P-CCNC: 45 U/L (ref 0–70)
ANION GAP SERPL CALCULATED.3IONS-SCNC: 10 MMOL/L (ref 7–15)
APPEARANCE UR: CLEAR
AST SERPL W P-5'-P-CCNC: 39 U/L (ref 0–45)
BASOPHILS # BLD AUTO: 0 10E3/UL (ref 0–0.2)
BASOPHILS NFR BLD AUTO: 0 %
BILIRUB SERPL-MCNC: 0.4 MG/DL
BILIRUB UR QL STRIP: NEGATIVE
BUN SERPL-MCNC: 11.3 MG/DL (ref 6–20)
CALCIUM SERPL-MCNC: 9.7 MG/DL (ref 8.6–10)
CHLORIDE SERPL-SCNC: 101 MMOL/L (ref 98–107)
COLOR UR AUTO: ABNORMAL
CREAT SERPL-MCNC: 1.06 MG/DL (ref 0.67–1.17)
DEPRECATED HCO3 PLAS-SCNC: 27 MMOL/L (ref 22–29)
EOSINOPHIL # BLD AUTO: 0 10E3/UL (ref 0–0.7)
EOSINOPHIL NFR BLD AUTO: 0 %
ERYTHROCYTE [DISTWIDTH] IN BLOOD BY AUTOMATED COUNT: 13.2 % (ref 10–15)
GFR SERPL CREATININE-BSD FRML MDRD: 85 ML/MIN/1.73M2
GLUCOSE SERPL-MCNC: 87 MG/DL (ref 70–99)
GLUCOSE UR STRIP-MCNC: NEGATIVE MG/DL
HCT VFR BLD AUTO: 46.4 % (ref 40–53)
HGB BLD-MCNC: 15.7 G/DL (ref 13.3–17.7)
HGB UR QL STRIP: ABNORMAL
IMM GRANULOCYTES # BLD: 0 10E3/UL
IMM GRANULOCYTES NFR BLD: 0 %
KETONES UR STRIP-MCNC: NEGATIVE MG/DL
LEUKOCYTE ESTERASE UR QL STRIP: NEGATIVE
LYMPHOCYTES # BLD AUTO: 0.4 10E3/UL (ref 0.8–5.3)
LYMPHOCYTES NFR BLD AUTO: 5 %
MCH RBC QN AUTO: 29.8 PG (ref 26.5–33)
MCHC RBC AUTO-ENTMCNC: 33.8 G/DL (ref 31.5–36.5)
MCV RBC AUTO: 88 FL (ref 78–100)
MONOCYTES # BLD AUTO: 0.5 10E3/UL (ref 0–1.3)
MONOCYTES NFR BLD AUTO: 5 %
MUCOUS THREADS #/AREA URNS LPF: PRESENT /LPF
NEUTROPHILS # BLD AUTO: 7.9 10E3/UL (ref 1.6–8.3)
NEUTROPHILS NFR BLD AUTO: 90 %
NITRATE UR QL: NEGATIVE
NRBC # BLD AUTO: 0 10E3/UL
NRBC BLD AUTO-RTO: 0 /100
PH UR STRIP: 7 [PH] (ref 5–7)
PLATELET # BLD AUTO: 194 10E3/UL (ref 150–450)
POTASSIUM SERPL-SCNC: 4.3 MMOL/L (ref 3.4–5.3)
PROT SERPL-MCNC: 6.9 G/DL (ref 6.4–8.3)
RBC # BLD AUTO: 5.27 10E6/UL (ref 4.4–5.9)
RBC URINE: 6 /HPF
SODIUM SERPL-SCNC: 138 MMOL/L (ref 136–145)
SP GR UR STRIP: 1.01 (ref 1–1.03)
SQUAMOUS EPITHELIAL: <1 /HPF
UROBILINOGEN UR STRIP-MCNC: NORMAL MG/DL
WBC # BLD AUTO: 8.9 10E3/UL (ref 4–11)
WBC URINE: 2 /HPF

## 2023-07-07 PROCEDURE — 81001 URINALYSIS AUTO W/SCOPE: CPT | Performed by: EMERGENCY MEDICINE

## 2023-07-07 PROCEDURE — 74176 CT ABD & PELVIS W/O CONTRAST: CPT

## 2023-07-07 PROCEDURE — 99284 EMERGENCY DEPT VISIT MOD MDM: CPT | Mod: 25

## 2023-07-07 PROCEDURE — 258N000003 HC RX IP 258 OP 636: Performed by: EMERGENCY MEDICINE

## 2023-07-07 PROCEDURE — 80053 COMPREHEN METABOLIC PANEL: CPT | Performed by: EMERGENCY MEDICINE

## 2023-07-07 PROCEDURE — 85025 COMPLETE CBC W/AUTO DIFF WBC: CPT | Performed by: EMERGENCY MEDICINE

## 2023-07-07 PROCEDURE — 96361 HYDRATE IV INFUSION ADD-ON: CPT

## 2023-07-07 PROCEDURE — 96360 HYDRATION IV INFUSION INIT: CPT

## 2023-07-07 PROCEDURE — 36415 COLL VENOUS BLD VENIPUNCTURE: CPT | Performed by: EMERGENCY MEDICINE

## 2023-07-07 RX ORDER — SODIUM CHLORIDE 9 MG/ML
INJECTION, SOLUTION INTRAVENOUS ONCE
Status: COMPLETED | OUTPATIENT
Start: 2023-07-07 | End: 2023-07-07

## 2023-07-07 RX ORDER — HYDROMORPHONE HYDROCHLORIDE 2 MG/1
2 TABLET ORAL EVERY 6 HOURS PRN
Qty: 10 TABLET | Refills: 0 | Status: SHIPPED | OUTPATIENT
Start: 2023-07-07 | End: 2023-07-10

## 2023-07-07 RX ADMIN — SODIUM CHLORIDE: 9 INJECTION, SOLUTION INTRAVENOUS at 17:12

## 2023-07-07 NOTE — ED TRIAGE NOTES
Patient arrives with left kidney stone pain- patient has chronic kidney stone pain, has passed multiple over the couple of weeks. Patient states pain worsened yesterday evening. Hx of two of kidney stone removal and stents. Mild nausea, no vomiting. No fevers/chills.

## 2023-07-08 NOTE — ED PROVIDER NOTES
History     Chief Complaint:  Flank Pain       HPI   Jim Florse is a 48 year old male anticoagulated on warfarin with a history of kidney stones who presents today with left flank pain. Patient has a long history of kidney stones. Reports 17 in the last year. Notes that this one has been worsening in pain and going for a long time. Just been taking ibuprofen and tylenol. Notes Flomax makes him congested and results in headaches.      Independent Historian:   None - Patient Only    Review of External Notes:     Medications:  Atorvastatin  Hydrodiuril   Plaquenil   Lorazepam  Prednisone  Inderal  Flomax  Venlafaxine  Warfarin    Past Medical History:     Anxiety  Hypercalciuria   Nephrolithiasis   Pulmonary infarct  SLE with normal kidneys    Past Surgical History:    Biopsy  Colonoscopy x2  Combined cystoscopy, retrogrades, ureteroscopy, laser holmium lithotripsy ureter insert stent   Kidney stne lithotripsy  Hernia repair   Parathyroidectomy   Kidney stone removal   Laser holmium lithotripsy ureter     Physical Exam     Patient Vitals for the past 24 hrs:   BP Temp Temp src Pulse Resp SpO2   07/07/23 1702 (!) 160/116 97.7  F (36.5  C) Temporal 92 20 98 %      Physical Exam  Vitals reviewed.   HENT:      Head: Normocephalic.   Eyes:      Pupils: Pupils are equal, round, and reactive to light.   Cardiovascular:      Rate and Rhythm: Normal rate.   Pulmonary:      Effort: Pulmonary effort is normal.   Abdominal:      General: Abdomen is flat. Bowel sounds are normal.   Musculoskeletal:         General: Normal range of motion.   Skin:     General: Skin is warm.      Capillary Refill: Capillary refill takes less than 2 seconds.   Neurological:      General: No focal deficit present.      Mental Status: He is alert.   Psychiatric:         Mood and Affect: Mood normal.         Emergency Department Course   Imaging:  CT Abdomen Pelvis w/o Contrast   Final Result   IMPRESSION:    1.  There is mild left-sided  hydronephrosis with a 5 mm stone at the left UPJ. In addition, the ureter beyond the UPJ is mildly dilated down to the bladder base and suspect recent passage of a stone from the left ureter, although no stone is seen in the    bladder. There are multiple additional stones throughout both kidneys with greater than 10 stones present in each kidney. The largest cluster of stones in the lower pole of the left kidney measures 1.1 cm in greatest dimension. Stone burden slightly    increased since the prior study.      2.  Mild urinary bladder wall thickening again seen.            Report per radiology    Laboratory:  Labs Ordered and Resulted from Time of ED Arrival to Time of ED Departure   ROUTINE UA WITH MICROSCOPIC REFLEX TO CULTURE - Abnormal       Result Value    Color Urine Light Yellow      Appearance Urine Clear      Glucose Urine Negative      Bilirubin Urine Negative      Ketones Urine Negative      Specific Gravity Urine 1.009      Blood Urine Trace (*)     pH Urine 7.0      Protein Albumin Urine Negative      Urobilinogen Urine Normal      Nitrite Urine Negative      Leukocyte Esterase Urine Negative      Mucus Urine Present (*)     RBC Urine 6 (*)     WBC Urine 2      Squamous Epithelials Urine <1     CBC WITH PLATELETS AND DIFFERENTIAL - Abnormal    WBC Count 8.9      RBC Count 5.27      Hemoglobin 15.7      Hematocrit 46.4      MCV 88      MCH 29.8      MCHC 33.8      RDW 13.2      Platelet Count 194      % Neutrophils 90      % Lymphocytes 5      % Monocytes 5      % Eosinophils 0      % Basophils 0      % Immature Granulocytes 0      NRBCs per 100 WBC 0      Absolute Neutrophils 7.9      Absolute Lymphocytes 0.4 (*)     Absolute Monocytes 0.5      Absolute Eosinophils 0.0      Absolute Basophils 0.0      Absolute Immature Granulocytes 0.0      Absolute NRBCs 0.0     COMPREHENSIVE METABOLIC PANEL - Normal    Sodium 138      Potassium 4.3      Chloride 101      Carbon Dioxide (CO2) 27      Anion Gap 10       Urea Nitrogen 11.3      Creatinine 1.06      Calcium 9.7      Glucose 87      Alkaline Phosphatase 45      AST 39      ALT 45      Protein Total 6.9      Albumin 4.7      Bilirubin Total 0.4      GFR Estimate 85        Procedures       Emergency Department Course & Assessments:       Interventions:  Medications   sodium chloride 0.9% infusion (0 mLs Intravenous Stopped 7/7/23 1947)      Assessments:  1923 I obtained history and examined the patient as noted above.   2000 I rechecked and updated the patient.     Independent Interpretation (X-rays, CTs, rhythm strip):      Consultations/Discussion of Management or Tests:  None       Social Determinants of Health affecting care:   None    Disposition:  The patient was discharged to home.     Impression & Plan    Medical Decision Making:  Patient presents with a left flank pain history of prior kidney stones.  Patient has been dealing with kidney stones this time for a few weeks.  Is concerned that the pain has not moved.  CT is positive for stone patient is on anticoagulation not a candidate for ibuprofen feels Flomax gives him a headache offered pain medication as a bridge to follow-up with urology.`    Diagnosis:    ICD-10-CM    1. Renal colic on left side  N23            Discharge Medications:  Discharge Medication List as of 7/7/2023  8:08 PM      START taking these medications    Details   HYDROmorphone (DILAUDID) 2 MG tablet Take 1 tablet (2 mg) by mouth every 6 hours as needed for pain, Disp-10 tablet, R-0, Local Print           Scribe Disclosure:  I, Marilin Shetty, am serving as a scribe at 7:35 PM on 7/7/2023 to document services personally performed by Ganesh Gibson MD based on my observations and the provider's statements to me.     7/7/2023   Ganesh Gibson MD Goodman, Brian Samuel, MD  07/09/23 1917

## 2023-07-08 NOTE — DISCHARGE INSTRUCTIONS
Your CT continues to struggle show increase stone burden bilaterally.  There is a 5 mm stone on the left.  Please continue to hydrate orally use medications for pain as needed please take Flomax.  Strain your urine.  Follow-up with urology to discuss definitive management of your kidney stones.  Return to the emergency room with fever greater than 100.4 pain not managed with medication or vomiting unable to tolerate pills.

## 2023-07-09 ENCOUNTER — HOSPITAL ENCOUNTER (EMERGENCY)
Facility: CLINIC | Age: 48
Discharge: HOME | End: 2023-07-09
Attending: PHYSICIAN ASSISTANT | Admitting: PHYSICIAN ASSISTANT
Payer: COMMERCIAL

## 2023-07-09 VITALS
HEART RATE: 88 BPM | SYSTOLIC BLOOD PRESSURE: 143 MMHG | OXYGEN SATURATION: 96 % | RESPIRATION RATE: 20 BRPM | DIASTOLIC BLOOD PRESSURE: 98 MMHG | TEMPERATURE: 98 F

## 2023-07-09 DIAGNOSIS — N20.1 LEFT URETERAL STONE: ICD-10-CM

## 2023-07-09 LAB
ALBUMIN UR-MCNC: NEGATIVE MG/DL
ANION GAP SERPL CALCULATED.3IONS-SCNC: 8 MMOL/L (ref 7–15)
APPEARANCE UR: CLEAR
BASOPHILS # BLD AUTO: 0 10E3/UL (ref 0–0.2)
BASOPHILS NFR BLD AUTO: 0 %
BILIRUB UR QL STRIP: NEGATIVE
BUN SERPL-MCNC: 11.9 MG/DL (ref 6–20)
CALCIUM SERPL-MCNC: 9.2 MG/DL (ref 8.6–10)
CHLORIDE SERPL-SCNC: 106 MMOL/L (ref 98–107)
COLOR UR AUTO: ABNORMAL
CREAT SERPL-MCNC: 1.09 MG/DL (ref 0.67–1.17)
DEPRECATED HCO3 PLAS-SCNC: 26 MMOL/L (ref 22–29)
EOSINOPHIL # BLD AUTO: 0.1 10E3/UL (ref 0–0.7)
EOSINOPHIL NFR BLD AUTO: 1 %
ERYTHROCYTE [DISTWIDTH] IN BLOOD BY AUTOMATED COUNT: 13.2 % (ref 10–15)
GFR SERPL CREATININE-BSD FRML MDRD: 83 ML/MIN/1.73M2
GLUCOSE SERPL-MCNC: 107 MG/DL (ref 70–99)
GLUCOSE UR STRIP-MCNC: NEGATIVE MG/DL
HCT VFR BLD AUTO: 47.1 % (ref 40–53)
HGB BLD-MCNC: 15.6 G/DL (ref 13.3–17.7)
HGB UR QL STRIP: ABNORMAL
HOLD SPECIMEN: NORMAL
HOLD SPECIMEN: NORMAL
IMM GRANULOCYTES # BLD: 0 10E3/UL
IMM GRANULOCYTES NFR BLD: 0 %
KETONES UR STRIP-MCNC: NEGATIVE MG/DL
LEUKOCYTE ESTERASE UR QL STRIP: NEGATIVE
LYMPHOCYTES # BLD AUTO: 0.7 10E3/UL (ref 0.8–5.3)
LYMPHOCYTES NFR BLD AUTO: 7 %
MCH RBC QN AUTO: 29.2 PG (ref 26.5–33)
MCHC RBC AUTO-ENTMCNC: 33.1 G/DL (ref 31.5–36.5)
MCV RBC AUTO: 88 FL (ref 78–100)
MONOCYTES # BLD AUTO: 0.9 10E3/UL (ref 0–1.3)
MONOCYTES NFR BLD AUTO: 8 %
MUCOUS THREADS #/AREA URNS LPF: PRESENT /LPF
NEUTROPHILS # BLD AUTO: 8.7 10E3/UL (ref 1.6–8.3)
NEUTROPHILS NFR BLD AUTO: 84 %
NITRATE UR QL: NEGATIVE
NRBC # BLD AUTO: 0 10E3/UL
NRBC BLD AUTO-RTO: 0 /100
PH UR STRIP: 6.5 [PH] (ref 5–7)
PLATELET # BLD AUTO: 182 10E3/UL (ref 150–450)
POTASSIUM SERPL-SCNC: 4.1 MMOL/L (ref 3.4–5.3)
RBC # BLD AUTO: 5.34 10E6/UL (ref 4.4–5.9)
RBC URINE: 12 /HPF
SODIUM SERPL-SCNC: 140 MMOL/L (ref 136–145)
SP GR UR STRIP: 1.01 (ref 1–1.03)
UROBILINOGEN UR STRIP-MCNC: NORMAL MG/DL
WBC # BLD AUTO: 10.5 10E3/UL (ref 4–11)
WBC URINE: 1 /HPF

## 2023-07-09 PROCEDURE — 96374 THER/PROPH/DIAG INJ IV PUSH: CPT

## 2023-07-09 PROCEDURE — 82310 ASSAY OF CALCIUM: CPT | Performed by: PHYSICIAN ASSISTANT

## 2023-07-09 PROCEDURE — 99284 EMERGENCY DEPT VISIT MOD MDM: CPT | Mod: 25

## 2023-07-09 PROCEDURE — 81001 URINALYSIS AUTO W/SCOPE: CPT | Performed by: PHYSICIAN ASSISTANT

## 2023-07-09 PROCEDURE — 36415 COLL VENOUS BLD VENIPUNCTURE: CPT | Performed by: PHYSICIAN ASSISTANT

## 2023-07-09 PROCEDURE — 96361 HYDRATE IV INFUSION ADD-ON: CPT

## 2023-07-09 PROCEDURE — 258N000003 HC RX IP 258 OP 636: Performed by: PHYSICIAN ASSISTANT

## 2023-07-09 PROCEDURE — 85025 COMPLETE CBC W/AUTO DIFF WBC: CPT | Performed by: PHYSICIAN ASSISTANT

## 2023-07-09 PROCEDURE — 250N000013 HC RX MED GY IP 250 OP 250 PS 637: Performed by: PHYSICIAN ASSISTANT

## 2023-07-09 PROCEDURE — 96375 TX/PRO/DX INJ NEW DRUG ADDON: CPT

## 2023-07-09 PROCEDURE — 250N000011 HC RX IP 250 OP 636: Mod: JZ | Performed by: PHYSICIAN ASSISTANT

## 2023-07-09 RX ORDER — HYDROMORPHONE HYDROCHLORIDE 1 MG/ML
0.5 INJECTION, SOLUTION INTRAMUSCULAR; INTRAVENOUS; SUBCUTANEOUS ONCE
Status: COMPLETED | OUTPATIENT
Start: 2023-07-09 | End: 2023-07-09

## 2023-07-09 RX ORDER — ACETAMINOPHEN 500 MG
1000 TABLET ORAL ONCE
Status: COMPLETED | OUTPATIENT
Start: 2023-07-09 | End: 2023-07-09

## 2023-07-09 RX ORDER — HYDROMORPHONE HYDROCHLORIDE 2 MG/1
2 TABLET ORAL ONCE
Status: COMPLETED | OUTPATIENT
Start: 2023-07-09 | End: 2023-07-09

## 2023-07-09 RX ORDER — ONDANSETRON 2 MG/ML
4 INJECTION INTRAMUSCULAR; INTRAVENOUS ONCE
Status: COMPLETED | OUTPATIENT
Start: 2023-07-09 | End: 2023-07-09

## 2023-07-09 RX ADMIN — ACETAMINOPHEN 1000 MG: 500 TABLET, FILM COATED ORAL at 12:17

## 2023-07-09 RX ADMIN — HYDROMORPHONE HYDROCHLORIDE 0.5 MG: 1 INJECTION, SOLUTION INTRAMUSCULAR; INTRAVENOUS; SUBCUTANEOUS at 11:06

## 2023-07-09 RX ADMIN — SODIUM CHLORIDE 1000 ML: 9 INJECTION, SOLUTION INTRAVENOUS at 11:01

## 2023-07-09 RX ADMIN — HYDROMORPHONE HYDROCHLORIDE 2 MG: 2 TABLET ORAL at 13:19

## 2023-07-09 RX ADMIN — ONDANSETRON 4 MG: 2 INJECTION INTRAMUSCULAR; INTRAVENOUS at 11:06

## 2023-07-09 ASSESSMENT — ACTIVITIES OF DAILY LIVING (ADL)
ADLS_ACUITY_SCORE: 37
ADLS_ACUITY_SCORE: 37

## 2023-07-09 NOTE — ED PROVIDER NOTES
History     Chief Complaint:  Pain       HPI   Jim Flores is a 48 year old male with a history of kidney stones, PE, anxiety, systemic lupus, anticoagulated on coumadin, diagnosed with a kidney stone on 7/7 who presents with flank pain. The patient reports that he has been having difficulty managing the pain at home with ibuprofen and Dilaudid. His last dose of Dilaudid was this morning around 0700. He has had associated nausea but no vomiting. The pain is in his left back radiating around to his LLQ. He has not passed any stones. He denies any fever.     CT abdomen pelvis w/o contrast 7/7/2023:  IMPRESSION:   1.  There is mild left-sided hydronephrosis with a 5 mm stone at the left UPJ. In addition, the ureter beyond the UPJ is mildly dilated down to the bladder base and suspect recent passage of a stone from the left ureter, although no stone is seen in the   bladder. There are multiple additional stones throughout both kidneys with greater than 10 stones present in each kidney. The largest cluster of stones in the lower pole of the left kidney measures 1.1 cm in greatest dimension. Stone burden slightly   increased since the prior study.     2.  Mild urinary bladder wall thickening again seen.      Independent Historian:  Independent     Review of External Notes:        Medications:    Lipitor  Colcyrs   Prednisone   Effexor   Coumadin   Plaquenil  Dilaudid    Past Medical History:    Anxiety   Nephrolithiasis   Pulmonary infarct   SLE with normal kidneys   GERD  PE  Hyperparathyroidism  Systemic Lupus  Sjogren's syndrome     Past Surgical History:    Biopsy, mole    Lithotripsy, insert stent, x 3  Parathyroidectomy   Hernia repair   Colonoscopy x 2     Physical Exam     Patient Vitals for the past 24 hrs:   BP Temp Temp src Pulse Resp SpO2   07/09/23 1217 -- -- -- -- -- 96 %   07/09/23 1119 (!) 143/98 -- -- 88 -- 95 %   07/09/23 1112 (!) 145/97 -- -- 87 -- 97 %   07/09/23 1004 (!) 158/109 98  F (36.7  C)  Temporal 80 20 97 %        Physical Exam  Vitals and nursing note reviewed.   Constitutional:       Comments: Looks uncomfortable   Eyes:      Conjunctiva/sclera: Conjunctivae normal.   Cardiovascular:      Rate and Rhythm: Normal rate and regular rhythm.      Heart sounds: Normal heart sounds. No murmur heard.     No friction rub. No gallop.   Abdominal:      General: There is no distension.      Palpations: Abdomen is soft.      Tenderness: There is no abdominal tenderness. There is no right CVA tenderness, left CVA tenderness or guarding.   Neurological:      Mental Status: He is alert and oriented to person, place, and time. Mental status is at baseline.   Psychiatric:         Mood and Affect: Mood normal.         Behavior: Behavior normal.         Thought Content: Thought content normal.           Emergency Department Course   Laboratory:  Labs Ordered and Resulted from Time of ED Arrival to Time of ED Departure   BASIC METABOLIC PANEL - Abnormal       Result Value    Sodium 140      Potassium 4.1      Chloride 106      Carbon Dioxide (CO2) 26      Anion Gap 8      Urea Nitrogen 11.9      Creatinine 1.09      Calcium 9.2      Glucose 107 (*)     GFR Estimate 83     ROUTINE UA WITH MICROSCOPIC REFLEX TO CULTURE - Abnormal    Color Urine Light Yellow      Appearance Urine Clear      Glucose Urine Negative      Bilirubin Urine Negative      Ketones Urine Negative      Specific Gravity Urine 1.009      Blood Urine Small (*)     pH Urine 6.5      Protein Albumin Urine Negative      Urobilinogen Urine Normal      Nitrite Urine Negative      Leukocyte Esterase Urine Negative      Mucus Urine Present (*)     RBC Urine 12 (*)     WBC Urine 1     CBC WITH PLATELETS AND DIFFERENTIAL - Abnormal    WBC Count 10.5      RBC Count 5.34      Hemoglobin 15.6      Hematocrit 47.1      MCV 88      MCH 29.2      MCHC 33.1      RDW 13.2      Platelet Count 182      % Neutrophils 84      % Lymphocytes 7      % Monocytes 8      %  Eosinophils 1      % Basophils 0      % Immature Granulocytes 0      NRBCs per 100 WBC 0      Absolute Neutrophils 8.7 (*)     Absolute Lymphocytes 0.7 (*)     Absolute Monocytes 0.9      Absolute Eosinophils 0.1      Absolute Basophils 0.0      Absolute Immature Granulocytes 0.0      Absolute NRBCs 0.0          Emergency Department Course & Assessments:             Interventions:  Medications   HYDROmorphone (PF) (DILAUDID) injection 0.5 mg (0.5 mg Intravenous $Given 7/9/23 1106)   ondansetron (ZOFRAN) injection 4 mg (4 mg Intravenous $Given 7/9/23 1106)   0.9% sodium chloride BOLUS (0 mLs Intravenous Stopped 7/9/23 1217)   acetaminophen (TYLENOL) tablet 1,000 mg (1,000 mg Oral $Given 7/9/23 1217)   HYDROmorphone (DILAUDID) tablet 2 mg (2 mg Oral $Given 7/9/23 1319)        Assessments:  1029 I evaluated the patient and obtained history    1149 I rechecked the patient.    1311 I rechecked the patient, his pain was starting to come back, will try oral Dilaudid.     1401 I rechecked the patient, his pain was better under control and he was requesting to be discharged     Independent Interpretation (X-rays, CTs, rhythm strip):  None    Consultations/Discussion of Management or Tests:  None       Social Determinants of Health affecting care:  None     Disposition:  The patient was discharged to home.     Impression & Plan    Medical Decision Making:    This is 48-year-old male who presents with worsening pain and uncontrolled pain from recent diagnosis of kidney stones.  Patient has well-known stone disease.  CT imaging from few days ago was consistent with left ureteral stone and stone disease.  Patient does not have any fevers, vomiting or acute abdominal tenderness on exam.  At this time I do not feel there is any indication for further CT imaging.  Labs are normal without evidence of kidney dysfunction.  UA not consistent with infection.  Patient's pain was controlled at first with IV medication but then switched to  oral medicine.  I suspect the patient got behind on his pain as he did not utilize his narcotic medication until 2 days after his diagnosis.  Discussed using Tylenol every 6 hours consistently to reduce pain levels and then keeping up with pain with his Dilaudid medication.  At this time patient is adequately controlled with his pain with oral Dilaudid he will follow-up with the primary care and/or urologist as indicated previously.  At this time I feel the patient safe to discharge home and does not require further admission to the hospital.  This is the patient's preference.  Patient understands if he develops worsening pain, nausea vomiting, fevers or increasing abdominal pain flank pain or worsening condition to return back to the emergency department.  He should avoid NSAIDs due to his chronic Coumadin use.      Diagnosis:    ICD-10-CM    1. Left ureteral stone  N20.1            Discharge Medications:  New Prescriptions    No medications on file          Scribe Disclosure:  CHANTAL, Sheng Vance, am serving as a scribe at 10:32 AM on 7/9/2023 to document services personally performed by Andrew Tirado PA-C based on my observations and the provider's statements to me.  7/9/2023   Andrew Tirado PA-C Kruger, Jacob C, PA-C  07/09/23 1937

## 2023-07-09 NOTE — DISCHARGE INSTRUCTIONS
Keep up on your pain control.  Tylenol 1000 mg every 6 hours.,  You can continue using your Dilaudid 2 mg every 6 hours as needed.

## 2023-07-09 NOTE — ED TRIAGE NOTES
Pt presents to the ED complaining of pain from kidney stones. Pt was in the ED on Friday and diagnosed with kidney stones. He is unable to manage pain at home. Dilaudid taken at 0700. Pain 6-8/10 depending on movement.       Triage Assessment     Row Name 07/09/23 1005       Triage Assessment (Adult)    Airway WDL WDL       Respiratory WDL    Respiratory WDL WDL       Skin Circulation/Temperature WDL    Skin Circulation/Temperature WDL WDL       Cardiac WDL    Cardiac WDL X  HTN       Peripheral/Neurovascular WDL    Peripheral Neurovascular WDL WDL       Cognitive/Neuro/Behavioral WDL    Cognitive/Neuro/Behavioral WDL WDL

## 2023-08-14 DIAGNOSIS — F41.1 ANXIETY STATE: ICD-10-CM

## 2023-08-15 RX ORDER — VENLAFAXINE HYDROCHLORIDE 150 MG/1
300 CAPSULE, EXTENDED RELEASE ORAL DAILY
Qty: 180 CAPSULE | Refills: 0 | Status: ON HOLD | OUTPATIENT
Start: 2023-08-15 | End: 2023-09-07

## 2023-09-07 ENCOUNTER — ANESTHESIA (OUTPATIENT)
Dept: SURGERY | Facility: CLINIC | Age: 48
End: 2023-09-07
Payer: COMMERCIAL

## 2023-09-07 ENCOUNTER — APPOINTMENT (OUTPATIENT)
Dept: CT IMAGING | Facility: CLINIC | Age: 48
End: 2023-09-07
Attending: EMERGENCY MEDICINE
Payer: COMMERCIAL

## 2023-09-07 ENCOUNTER — ANESTHESIA EVENT (OUTPATIENT)
Dept: SURGERY | Facility: CLINIC | Age: 48
End: 2023-09-07
Payer: COMMERCIAL

## 2023-09-07 ENCOUNTER — HOSPITAL ENCOUNTER (OUTPATIENT)
Facility: CLINIC | Age: 48
Setting detail: OBSERVATION
Discharge: HOME | End: 2023-09-08
Attending: EMERGENCY MEDICINE | Admitting: UROLOGY
Payer: COMMERCIAL

## 2023-09-07 ENCOUNTER — APPOINTMENT (OUTPATIENT)
Dept: GENERAL RADIOLOGY | Facility: CLINIC | Age: 48
End: 2023-09-07
Attending: HOSPITALIST
Payer: COMMERCIAL

## 2023-09-07 DIAGNOSIS — R07.9 CHEST PAIN, UNSPECIFIED TYPE: ICD-10-CM

## 2023-09-07 DIAGNOSIS — I31.39 PERICARDIAL EFFUSION: ICD-10-CM

## 2023-09-07 DIAGNOSIS — J90 PLEURAL EFFUSION ON LEFT: ICD-10-CM

## 2023-09-07 DIAGNOSIS — I51.7 CARDIOMEGALY: ICD-10-CM

## 2023-09-07 DIAGNOSIS — N20.0 BILATERAL NEPHROLITHIASIS: ICD-10-CM

## 2023-09-07 DIAGNOSIS — N20.1 RIGHT URETERAL STONE: ICD-10-CM

## 2023-09-07 LAB
ALBUMIN UR-MCNC: 10 MG/DL
ANION GAP SERPL CALCULATED.3IONS-SCNC: 11 MMOL/L (ref 7–15)
APPEARANCE UR: CLEAR
ATRIAL RATE - MUSE: 91 BPM
BASOPHILS # BLD AUTO: 0 10E3/UL (ref 0–0.2)
BASOPHILS NFR BLD AUTO: 0 %
BILIRUB UR QL STRIP: NEGATIVE
BUN SERPL-MCNC: 13.1 MG/DL (ref 6–20)
CALCIUM SERPL-MCNC: 9 MG/DL (ref 8.6–10)
CHLORIDE SERPL-SCNC: 99 MMOL/L (ref 98–107)
COLOR UR AUTO: YELLOW
CREAT SERPL-MCNC: 1.13 MG/DL (ref 0.67–1.17)
DEPRECATED HCO3 PLAS-SCNC: 26 MMOL/L (ref 22–29)
DIASTOLIC BLOOD PRESSURE - MUSE: NORMAL MMHG
EGFRCR SERPLBLD CKD-EPI 2021: 79 ML/MIN/1.73M2
EOSINOPHIL # BLD AUTO: 0.1 10E3/UL (ref 0–0.7)
EOSINOPHIL NFR BLD AUTO: 1 %
ERYTHROCYTE [DISTWIDTH] IN BLOOD BY AUTOMATED COUNT: 13.2 % (ref 10–15)
GLUCOSE SERPL-MCNC: 106 MG/DL (ref 70–99)
GLUCOSE UR STRIP-MCNC: NEGATIVE MG/DL
HCT VFR BLD AUTO: 42.4 % (ref 40–53)
HGB BLD-MCNC: 14.2 G/DL (ref 13.3–17.7)
HGB UR QL STRIP: ABNORMAL
IMM GRANULOCYTES # BLD: 0 10E3/UL
IMM GRANULOCYTES NFR BLD: 0 %
INR PPP: 1.09 (ref 0.85–1.15)
INTERPRETATION ECG - MUSE: NORMAL
KETONES UR STRIP-MCNC: NEGATIVE MG/DL
LACTATE SERPL-SCNC: 1.2 MMOL/L (ref 0.7–2)
LEUKOCYTE ESTERASE UR QL STRIP: NEGATIVE
LYMPHOCYTES # BLD AUTO: 1 10E3/UL (ref 0.8–5.3)
LYMPHOCYTES NFR BLD AUTO: 11 %
MCH RBC QN AUTO: 28.6 PG (ref 26.5–33)
MCHC RBC AUTO-ENTMCNC: 33.5 G/DL (ref 31.5–36.5)
MCV RBC AUTO: 86 FL (ref 78–100)
MONOCYTES # BLD AUTO: 0.9 10E3/UL (ref 0–1.3)
MONOCYTES NFR BLD AUTO: 10 %
MUCOUS THREADS #/AREA URNS LPF: PRESENT /LPF
NEUTROPHILS # BLD AUTO: 7.3 10E3/UL (ref 1.6–8.3)
NEUTROPHILS NFR BLD AUTO: 78 %
NITRATE UR QL: NEGATIVE
NRBC # BLD AUTO: 0 10E3/UL
NRBC BLD AUTO-RTO: 0 /100
P AXIS - MUSE: 15 DEGREES
PH UR STRIP: 6 [PH] (ref 5–7)
PLATELET # BLD AUTO: 215 10E3/UL (ref 150–450)
POTASSIUM SERPL-SCNC: 3.6 MMOL/L (ref 3.4–5.3)
PR INTERVAL - MUSE: 170 MS
QRS DURATION - MUSE: 102 MS
QT - MUSE: 380 MS
QTC - MUSE: 467 MS
R AXIS - MUSE: -1 DEGREES
RBC # BLD AUTO: 4.96 10E6/UL (ref 4.4–5.9)
RBC URINE: 68 /HPF
SODIUM SERPL-SCNC: 136 MMOL/L (ref 136–145)
SP GR UR STRIP: 1.02 (ref 1–1.03)
SPERM #/AREA URNS HPF: PRESENT /HPF
SYSTOLIC BLOOD PRESSURE - MUSE: NORMAL MMHG
T AXIS - MUSE: 44 DEGREES
TROPONIN T SERPL HS-MCNC: 7 NG/L
UROBILINOGEN UR STRIP-MCNC: NORMAL MG/DL
VENTRICULAR RATE- MUSE: 91 BPM
WBC # BLD AUTO: 9.4 10E3/UL (ref 4–11)
WBC URINE: 3 /HPF

## 2023-09-07 PROCEDURE — 81001 URINALYSIS AUTO W/SCOPE: CPT | Performed by: EMERGENCY MEDICINE

## 2023-09-07 PROCEDURE — 250N000012 HC RX MED GY IP 250 OP 636 PS 637: Performed by: PHYSICIAN ASSISTANT

## 2023-09-07 PROCEDURE — G0378 HOSPITAL OBSERVATION PER HR: HCPCS

## 2023-09-07 PROCEDURE — 74177 CT ABD & PELVIS W/CONTRAST: CPT

## 2023-09-07 PROCEDURE — 250N000025 HC SEVOFLURANE, PER MIN: Performed by: UROLOGY

## 2023-09-07 PROCEDURE — C1758 CATHETER, URETERAL: HCPCS | Performed by: UROLOGY

## 2023-09-07 PROCEDURE — 250N000011 HC RX IP 250 OP 636: Performed by: EMERGENCY MEDICINE

## 2023-09-07 PROCEDURE — 258N000003 HC RX IP 258 OP 636: Performed by: ANESTHESIOLOGY

## 2023-09-07 PROCEDURE — 85610 PROTHROMBIN TIME: CPT | Performed by: EMERGENCY MEDICINE

## 2023-09-07 PROCEDURE — 74420 UROGRAPHY RTRGR +-KUB: CPT | Mod: 26 | Performed by: UROLOGY

## 2023-09-07 PROCEDURE — 250N000011 HC RX IP 250 OP 636: Mod: JZ | Performed by: EMERGENCY MEDICINE

## 2023-09-07 PROCEDURE — 36415 COLL VENOUS BLD VENIPUNCTURE: CPT | Performed by: EMERGENCY MEDICINE

## 2023-09-07 PROCEDURE — 250N000009 HC RX 250: Performed by: EMERGENCY MEDICINE

## 2023-09-07 PROCEDURE — 52332 CYSTOSCOPY AND TREATMENT: CPT | Mod: 50 | Performed by: UROLOGY

## 2023-09-07 PROCEDURE — 250N000011 HC RX IP 250 OP 636: Performed by: STUDENT IN AN ORGANIZED HEALTH CARE EDUCATION/TRAINING PROGRAM

## 2023-09-07 PROCEDURE — 999N000179 XR SURGERY CARM FLUORO LESS THAN 5 MIN W STILLS: Mod: TC

## 2023-09-07 PROCEDURE — 96361 HYDRATE IV INFUSION ADD-ON: CPT

## 2023-09-07 PROCEDURE — 96374 THER/PROPH/DIAG INJ IV PUSH: CPT | Mod: 59

## 2023-09-07 PROCEDURE — 85025 COMPLETE CBC W/AUTO DIFF WBC: CPT | Performed by: EMERGENCY MEDICINE

## 2023-09-07 PROCEDURE — 250N000009 HC RX 250: Performed by: NURSE ANESTHETIST, CERTIFIED REGISTERED

## 2023-09-07 PROCEDURE — 96376 TX/PRO/DX INJ SAME DRUG ADON: CPT | Mod: 59

## 2023-09-07 PROCEDURE — 999N000141 HC STATISTIC PRE-PROCEDURE NURSING ASSESSMENT: Performed by: UROLOGY

## 2023-09-07 PROCEDURE — 83605 ASSAY OF LACTIC ACID: CPT | Performed by: EMERGENCY MEDICINE

## 2023-09-07 PROCEDURE — C2617 STENT, NON-COR, TEM W/O DEL: HCPCS | Performed by: UROLOGY

## 2023-09-07 PROCEDURE — 272N000001 HC OR GENERAL SUPPLY STERILE: Performed by: UROLOGY

## 2023-09-07 PROCEDURE — 99204 OFFICE O/P NEW MOD 45 MIN: CPT | Mod: 25 | Performed by: UROLOGY

## 2023-09-07 PROCEDURE — 250N000011 HC RX IP 250 OP 636: Performed by: NURSE ANESTHETIST, CERTIFIED REGISTERED

## 2023-09-07 PROCEDURE — 84484 ASSAY OF TROPONIN QUANT: CPT | Performed by: EMERGENCY MEDICINE

## 2023-09-07 PROCEDURE — 93005 ELECTROCARDIOGRAM TRACING: CPT

## 2023-09-07 PROCEDURE — 360N000083 HC SURGERY LEVEL 3 W/ FLUORO, PER MIN: Performed by: UROLOGY

## 2023-09-07 PROCEDURE — 80048 BASIC METABOLIC PNL TOTAL CA: CPT | Performed by: EMERGENCY MEDICINE

## 2023-09-07 PROCEDURE — 258N000003 HC RX IP 258 OP 636: Performed by: EMERGENCY MEDICINE

## 2023-09-07 PROCEDURE — 96361 HYDRATE IV INFUSION ADD-ON: CPT | Mod: 59

## 2023-09-07 PROCEDURE — 250N000013 HC RX MED GY IP 250 OP 250 PS 637: Performed by: EMERGENCY MEDICINE

## 2023-09-07 PROCEDURE — 258N000003 HC RX IP 258 OP 636: Performed by: PHYSICIAN ASSISTANT

## 2023-09-07 PROCEDURE — 87086 URINE CULTURE/COLONY COUNT: CPT | Performed by: PHYSICIAN ASSISTANT

## 2023-09-07 PROCEDURE — 710N000009 HC RECOVERY PHASE 1, LEVEL 1, PER MIN: Performed by: UROLOGY

## 2023-09-07 PROCEDURE — 99222 1ST HOSP IP/OBS MODERATE 55: CPT | Mod: AI | Performed by: PHYSICIAN ASSISTANT

## 2023-09-07 PROCEDURE — 96375 TX/PRO/DX INJ NEW DRUG ADDON: CPT | Mod: 59

## 2023-09-07 PROCEDURE — 370N000017 HC ANESTHESIA TECHNICAL FEE, PER MIN: Performed by: UROLOGY

## 2023-09-07 PROCEDURE — 250N000011 HC RX IP 250 OP 636: Performed by: ANESTHESIOLOGY

## 2023-09-07 PROCEDURE — 250N000013 HC RX MED GY IP 250 OP 250 PS 637: Performed by: PHYSICIAN ASSISTANT

## 2023-09-07 PROCEDURE — 258N000003 HC RX IP 258 OP 636: Performed by: STUDENT IN AN ORGANIZED HEALTH CARE EDUCATION/TRAINING PROGRAM

## 2023-09-07 PROCEDURE — 99285 EMERGENCY DEPT VISIT HI MDM: CPT | Mod: 25

## 2023-09-07 DEVICE — STENT URETERAL POLARIS ULTRA 6FRX26CM M0061921330
Type: IMPLANTABLE DEVICE | Site: URETER | Status: NON-FUNCTIONAL
Removed: 2023-09-25

## 2023-09-07 RX ORDER — LIDOCAINE HYDROCHLORIDE 20 MG/ML
INJECTION, SOLUTION INFILTRATION; PERINEURAL PRN
Status: DISCONTINUED | OUTPATIENT
Start: 2023-09-07 | End: 2023-09-07

## 2023-09-07 RX ORDER — TAMSULOSIN HYDROCHLORIDE 0.4 MG/1
0.4 CAPSULE ORAL DAILY
Status: DISCONTINUED | OUTPATIENT
Start: 2023-09-07 | End: 2023-09-08

## 2023-09-07 RX ORDER — ONDANSETRON 4 MG/1
4 TABLET, ORALLY DISINTEGRATING ORAL EVERY 8 HOURS PRN
Qty: 10 TABLET | Refills: 0 | Status: SHIPPED | OUTPATIENT
Start: 2023-09-07 | End: 2023-09-08

## 2023-09-07 RX ORDER — HYDRALAZINE HYDROCHLORIDE 20 MG/ML
2.5-5 INJECTION INTRAMUSCULAR; INTRAVENOUS EVERY 10 MIN PRN
Status: DISCONTINUED | OUTPATIENT
Start: 2023-09-07 | End: 2023-09-07 | Stop reason: HOSPADM

## 2023-09-07 RX ORDER — ONDANSETRON 2 MG/ML
4 INJECTION INTRAMUSCULAR; INTRAVENOUS EVERY 30 MIN PRN
Status: DISCONTINUED | OUTPATIENT
Start: 2023-09-07 | End: 2023-09-07 | Stop reason: HOSPADM

## 2023-09-07 RX ORDER — DIPHENHYDRAMINE HCL 25 MG
25 CAPSULE ORAL EVERY 6 HOURS PRN
Status: DISCONTINUED | OUTPATIENT
Start: 2023-09-07 | End: 2023-09-07 | Stop reason: HOSPADM

## 2023-09-07 RX ORDER — DIAZEPAM 10 MG/2ML
2.5 INJECTION, SOLUTION INTRAMUSCULAR; INTRAVENOUS
Status: DISCONTINUED | OUTPATIENT
Start: 2023-09-07 | End: 2023-09-07 | Stop reason: HOSPADM

## 2023-09-07 RX ORDER — OXYCODONE HYDROCHLORIDE 5 MG/1
5 TABLET ORAL ONCE
Status: COMPLETED | OUTPATIENT
Start: 2023-09-07 | End: 2023-09-07

## 2023-09-07 RX ORDER — HYDROMORPHONE HYDROCHLORIDE 1 MG/ML
0.5 INJECTION, SOLUTION INTRAMUSCULAR; INTRAVENOUS; SUBCUTANEOUS EVERY 30 MIN PRN
Status: DISCONTINUED | OUTPATIENT
Start: 2023-09-07 | End: 2023-09-08 | Stop reason: ALTCHOICE

## 2023-09-07 RX ORDER — ONDANSETRON 4 MG/1
4 TABLET, ORALLY DISINTEGRATING ORAL EVERY 30 MIN PRN
Status: DISCONTINUED | OUTPATIENT
Start: 2023-09-07 | End: 2023-09-07 | Stop reason: HOSPADM

## 2023-09-07 RX ORDER — FENTANYL CITRATE 50 UG/ML
INJECTION, SOLUTION INTRAMUSCULAR; INTRAVENOUS PRN
Status: DISCONTINUED | OUTPATIENT
Start: 2023-09-07 | End: 2023-09-07

## 2023-09-07 RX ORDER — DIPHENHYDRAMINE HYDROCHLORIDE 50 MG/ML
25 INJECTION INTRAMUSCULAR; INTRAVENOUS EVERY 6 HOURS PRN
Status: DISCONTINUED | OUTPATIENT
Start: 2023-09-07 | End: 2023-09-07 | Stop reason: HOSPADM

## 2023-09-07 RX ORDER — OXYCODONE HYDROCHLORIDE 5 MG/1
5 TABLET ORAL EVERY 4 HOURS PRN
Status: DISCONTINUED | OUTPATIENT
Start: 2023-09-07 | End: 2023-09-08 | Stop reason: HOSPADM

## 2023-09-07 RX ORDER — SODIUM CHLORIDE, SODIUM LACTATE, POTASSIUM CHLORIDE, CALCIUM CHLORIDE 600; 310; 30; 20 MG/100ML; MG/100ML; MG/100ML; MG/100ML
INJECTION, SOLUTION INTRAVENOUS CONTINUOUS
Status: DISCONTINUED | OUTPATIENT
Start: 2023-09-07 | End: 2023-09-07 | Stop reason: HOSPADM

## 2023-09-07 RX ORDER — NALOXONE HYDROCHLORIDE 0.4 MG/ML
0.4 INJECTION, SOLUTION INTRAMUSCULAR; INTRAVENOUS; SUBCUTANEOUS
Status: DISCONTINUED | OUTPATIENT
Start: 2023-09-07 | End: 2023-09-08 | Stop reason: HOSPADM

## 2023-09-07 RX ORDER — OXYCODONE HYDROCHLORIDE 5 MG/1
5 TABLET ORAL
Status: DISCONTINUED | OUTPATIENT
Start: 2023-09-07 | End: 2023-09-07 | Stop reason: HOSPADM

## 2023-09-07 RX ORDER — HALOPERIDOL 5 MG/ML
1 INJECTION INTRAMUSCULAR
Status: DISCONTINUED | OUTPATIENT
Start: 2023-09-07 | End: 2023-09-07 | Stop reason: HOSPADM

## 2023-09-07 RX ORDER — VENLAFAXINE HYDROCHLORIDE 150 MG/1
150 TABLET, EXTENDED RELEASE ORAL 2 TIMES DAILY
COMMUNITY
End: 2023-12-15

## 2023-09-07 RX ORDER — GLYCOPYRROLATE 0.2 MG/ML
INJECTION, SOLUTION INTRAMUSCULAR; INTRAVENOUS PRN
Status: DISCONTINUED | OUTPATIENT
Start: 2023-09-07 | End: 2023-09-07

## 2023-09-07 RX ORDER — NALOXONE HYDROCHLORIDE 0.4 MG/ML
0.2 INJECTION, SOLUTION INTRAMUSCULAR; INTRAVENOUS; SUBCUTANEOUS
Status: DISCONTINUED | OUTPATIENT
Start: 2023-09-07 | End: 2023-09-08 | Stop reason: HOSPADM

## 2023-09-07 RX ORDER — HYDROXYCHLOROQUINE SULFATE 200 MG/1
200 TABLET, FILM COATED ORAL 2 TIMES DAILY
Status: DISCONTINUED | OUTPATIENT
Start: 2023-09-07 | End: 2023-09-08 | Stop reason: HOSPADM

## 2023-09-07 RX ORDER — PREDNISONE 20 MG/1
20 TABLET ORAL DAILY
Status: DISCONTINUED | OUTPATIENT
Start: 2023-09-07 | End: 2023-09-08 | Stop reason: HOSPADM

## 2023-09-07 RX ORDER — KETOCONAZOLE 20 MG/ML
SHAMPOO TOPICAL
COMMUNITY

## 2023-09-07 RX ORDER — ACETAMINOPHEN 325 MG/1
650 TABLET ORAL EVERY 6 HOURS PRN
Status: DISCONTINUED | OUTPATIENT
Start: 2023-09-07 | End: 2023-09-08 | Stop reason: HOSPADM

## 2023-09-07 RX ORDER — ONDANSETRON 2 MG/ML
INJECTION INTRAMUSCULAR; INTRAVENOUS PRN
Status: DISCONTINUED | OUTPATIENT
Start: 2023-09-07 | End: 2023-09-07

## 2023-09-07 RX ORDER — ONDANSETRON 2 MG/ML
4 INJECTION INTRAMUSCULAR; INTRAVENOUS EVERY 6 HOURS PRN
Status: DISCONTINUED | OUTPATIENT
Start: 2023-09-07 | End: 2023-09-08 | Stop reason: HOSPADM

## 2023-09-07 RX ORDER — OXYCODONE HYDROCHLORIDE 5 MG/1
10 TABLET ORAL
Status: DISCONTINUED | OUTPATIENT
Start: 2023-09-07 | End: 2023-09-07 | Stop reason: HOSPADM

## 2023-09-07 RX ORDER — HYDROMORPHONE HCL IN WATER/PF 6 MG/30 ML
0.4 PATIENT CONTROLLED ANALGESIA SYRINGE INTRAVENOUS EVERY 5 MIN PRN
Status: DISCONTINUED | OUTPATIENT
Start: 2023-09-07 | End: 2023-09-07 | Stop reason: HOSPADM

## 2023-09-07 RX ORDER — LIDOCAINE 40 MG/G
CREAM TOPICAL
Status: DISCONTINUED | OUTPATIENT
Start: 2023-09-07 | End: 2023-09-07 | Stop reason: HOSPADM

## 2023-09-07 RX ORDER — ACETAMINOPHEN 325 MG/1
975 TABLET ORAL ONCE
Status: DISCONTINUED | OUTPATIENT
Start: 2023-09-07 | End: 2023-09-07 | Stop reason: HOSPADM

## 2023-09-07 RX ORDER — IOPAMIDOL 755 MG/ML
500 INJECTION, SOLUTION INTRAVASCULAR ONCE
Status: COMPLETED | OUTPATIENT
Start: 2023-09-07 | End: 2023-09-07

## 2023-09-07 RX ORDER — LABETALOL HYDROCHLORIDE 5 MG/ML
10 INJECTION, SOLUTION INTRAVENOUS
Status: DISCONTINUED | OUTPATIENT
Start: 2023-09-07 | End: 2023-09-07 | Stop reason: HOSPADM

## 2023-09-07 RX ORDER — HYDROMORPHONE HCL IN WATER/PF 6 MG/30 ML
0.2 PATIENT CONTROLLED ANALGESIA SYRINGE INTRAVENOUS EVERY 5 MIN PRN
Status: DISCONTINUED | OUTPATIENT
Start: 2023-09-07 | End: 2023-09-07 | Stop reason: HOSPADM

## 2023-09-07 RX ORDER — FLUTICASONE PROPIONATE 50 MCG
1-2 SPRAY, SUSPENSION (ML) NASAL DAILY PRN
COMMUNITY
End: 2023-10-04

## 2023-09-07 RX ORDER — WARFARIN SODIUM 5 MG/1
TABLET ORAL DAILY
COMMUNITY

## 2023-09-07 RX ORDER — PREDNISONE 20 MG/1
20 TABLET ORAL DAILY
COMMUNITY
Start: 2023-09-04 | End: 2023-09-29

## 2023-09-07 RX ORDER — FENTANYL CITRATE 50 UG/ML
25 INJECTION, SOLUTION INTRAMUSCULAR; INTRAVENOUS EVERY 5 MIN PRN
Status: DISCONTINUED | OUTPATIENT
Start: 2023-09-07 | End: 2023-09-07 | Stop reason: HOSPADM

## 2023-09-07 RX ORDER — ONDANSETRON 2 MG/ML
4 INJECTION INTRAMUSCULAR; INTRAVENOUS ONCE
Status: COMPLETED | OUTPATIENT
Start: 2023-09-07 | End: 2023-09-07

## 2023-09-07 RX ORDER — TRIAMCINOLONE ACETONIDE 1 MG/G
CREAM TOPICAL
COMMUNITY

## 2023-09-07 RX ORDER — HYDROMORPHONE HCL IN WATER/PF 6 MG/30 ML
0.4 PATIENT CONTROLLED ANALGESIA SYRINGE INTRAVENOUS
Status: DISCONTINUED | OUTPATIENT
Start: 2023-09-07 | End: 2023-09-08 | Stop reason: HOSPADM

## 2023-09-07 RX ORDER — SODIUM CHLORIDE 9 MG/ML
INJECTION, SOLUTION INTRAVENOUS CONTINUOUS
Status: DISCONTINUED | OUTPATIENT
Start: 2023-09-07 | End: 2023-09-08

## 2023-09-07 RX ORDER — HYDROMORPHONE HCL IN WATER/PF 6 MG/30 ML
0.2 PATIENT CONTROLLED ANALGESIA SYRINGE INTRAVENOUS
Status: DISCONTINUED | OUTPATIENT
Start: 2023-09-07 | End: 2023-09-08 | Stop reason: HOSPADM

## 2023-09-07 RX ORDER — DEXAMETHASONE SODIUM PHOSPHATE 4 MG/ML
INJECTION, SOLUTION INTRA-ARTICULAR; INTRALESIONAL; INTRAMUSCULAR; INTRAVENOUS; SOFT TISSUE PRN
Status: DISCONTINUED | OUTPATIENT
Start: 2023-09-07 | End: 2023-09-07

## 2023-09-07 RX ORDER — FENTANYL CITRATE 50 UG/ML
50 INJECTION, SOLUTION INTRAMUSCULAR; INTRAVENOUS EVERY 5 MIN PRN
Status: DISCONTINUED | OUTPATIENT
Start: 2023-09-07 | End: 2023-09-07 | Stop reason: HOSPADM

## 2023-09-07 RX ORDER — HYDROMORPHONE HYDROCHLORIDE 1 MG/ML
0.5 INJECTION, SOLUTION INTRAMUSCULAR; INTRAVENOUS; SUBCUTANEOUS ONCE
Status: COMPLETED | OUTPATIENT
Start: 2023-09-07 | End: 2023-09-07

## 2023-09-07 RX ORDER — DIMENHYDRINATE 50 MG/ML
25 INJECTION, SOLUTION INTRAMUSCULAR; INTRAVENOUS
Status: DISCONTINUED | OUTPATIENT
Start: 2023-09-07 | End: 2023-09-07 | Stop reason: HOSPADM

## 2023-09-07 RX ORDER — ONDANSETRON 4 MG/1
4 TABLET, ORALLY DISINTEGRATING ORAL EVERY 6 HOURS PRN
Status: DISCONTINUED | OUTPATIENT
Start: 2023-09-07 | End: 2023-09-08 | Stop reason: HOSPADM

## 2023-09-07 RX ORDER — ALBUTEROL SULFATE 0.83 MG/ML
2.5 SOLUTION RESPIRATORY (INHALATION) EVERY 4 HOURS PRN
Status: DISCONTINUED | OUTPATIENT
Start: 2023-09-07 | End: 2023-09-07 | Stop reason: HOSPADM

## 2023-09-07 RX ORDER — LIDOCAINE 40 MG/G
CREAM TOPICAL
Status: DISCONTINUED | OUTPATIENT
Start: 2023-09-07 | End: 2023-09-08 | Stop reason: HOSPADM

## 2023-09-07 RX ORDER — VENLAFAXINE HYDROCHLORIDE 150 MG/1
150 CAPSULE, EXTENDED RELEASE ORAL 2 TIMES DAILY
Status: DISCONTINUED | OUTPATIENT
Start: 2023-09-07 | End: 2023-09-08 | Stop reason: HOSPADM

## 2023-09-07 RX ORDER — PROPOFOL 10 MG/ML
INJECTION, EMULSION INTRAVENOUS PRN
Status: DISCONTINUED | OUTPATIENT
Start: 2023-09-07 | End: 2023-09-07

## 2023-09-07 RX ORDER — TAMSULOSIN HYDROCHLORIDE 0.4 MG/1
0.4 CAPSULE ORAL DAILY
Qty: 10 CAPSULE | Refills: 0 | Status: SHIPPED | OUTPATIENT
Start: 2023-09-07 | End: 2023-09-08

## 2023-09-07 RX ADMIN — LIDOCAINE HYDROCHLORIDE 50 MG: 20 INJECTION, SOLUTION INFILTRATION; PERINEURAL at 21:24

## 2023-09-07 RX ADMIN — SODIUM CHLORIDE 1000 ML: 9 INJECTION, SOLUTION INTRAVENOUS at 03:32

## 2023-09-07 RX ADMIN — ONDANSETRON 4 MG: 2 INJECTION INTRAMUSCULAR; INTRAVENOUS at 03:29

## 2023-09-07 RX ADMIN — GLYCOPYRROLATE 0.2 MG: 0.2 INJECTION, SOLUTION INTRAMUSCULAR; INTRAVENOUS at 21:24

## 2023-09-07 RX ADMIN — HYDROMORPHONE HYDROCHLORIDE 0.5 MG: 1 INJECTION, SOLUTION INTRAMUSCULAR; INTRAVENOUS; SUBCUTANEOUS at 04:25

## 2023-09-07 RX ADMIN — ACETAMINOPHEN 650 MG: 325 TABLET, FILM COATED ORAL at 14:21

## 2023-09-07 RX ADMIN — VENLAFAXINE HYDROCHLORIDE 150 MG: 150 CAPSULE, EXTENDED RELEASE ORAL at 22:44

## 2023-09-07 RX ADMIN — HYDROMORPHONE HYDROCHLORIDE 0.4 MG: 0.2 INJECTION, SOLUTION INTRAMUSCULAR; INTRAVENOUS; SUBCUTANEOUS at 22:48

## 2023-09-07 RX ADMIN — FENTANYL CITRATE 100 MCG: 50 INJECTION, SOLUTION INTRAMUSCULAR; INTRAVENOUS at 21:24

## 2023-09-07 RX ADMIN — SODIUM CHLORIDE, POTASSIUM CHLORIDE, SODIUM LACTATE AND CALCIUM CHLORIDE: 600; 310; 30; 20 INJECTION, SOLUTION INTRAVENOUS at 18:34

## 2023-09-07 RX ADMIN — ONDANSETRON 4 MG: 2 INJECTION INTRAMUSCULAR; INTRAVENOUS at 21:24

## 2023-09-07 RX ADMIN — DEXAMETHASONE SODIUM PHOSPHATE 8 MG: 4 INJECTION, SOLUTION INTRA-ARTICULAR; INTRALESIONAL; INTRAMUSCULAR; INTRAVENOUS; SOFT TISSUE at 21:24

## 2023-09-07 RX ADMIN — FENTANYL CITRATE 25 MCG: 50 INJECTION INTRAMUSCULAR; INTRAVENOUS at 22:34

## 2023-09-07 RX ADMIN — HYDROMORPHONE HYDROCHLORIDE 0.5 MG: 1 INJECTION, SOLUTION INTRAMUSCULAR; INTRAVENOUS; SUBCUTANEOUS at 03:32

## 2023-09-07 RX ADMIN — PREDNISONE 20 MG: 20 TABLET ORAL at 14:35

## 2023-09-07 RX ADMIN — FENTANYL CITRATE 50 MCG: 50 INJECTION INTRAMUSCULAR; INTRAVENOUS at 22:25

## 2023-09-07 RX ADMIN — GENTAMICIN SULFATE 400 MG: 40 INJECTION, SOLUTION INTRAMUSCULAR; INTRAVENOUS at 20:05

## 2023-09-07 RX ADMIN — SODIUM CHLORIDE: 9 INJECTION, SOLUTION INTRAVENOUS at 09:40

## 2023-09-07 RX ADMIN — OXYCODONE HYDROCHLORIDE 5 MG: 5 TABLET ORAL at 05:45

## 2023-09-07 RX ADMIN — FENTANYL CITRATE 25 MCG: 50 INJECTION INTRAMUSCULAR; INTRAVENOUS at 22:16

## 2023-09-07 RX ADMIN — HYDROMORPHONE HYDROCHLORIDE 0.5 MG: 1 INJECTION, SOLUTION INTRAMUSCULAR; INTRAVENOUS; SUBCUTANEOUS at 07:45

## 2023-09-07 RX ADMIN — SODIUM CHLORIDE 80 ML: 9 INJECTION, SOLUTION INTRAVENOUS at 05:27

## 2023-09-07 RX ADMIN — IOPAMIDOL 60 ML: 755 INJECTION, SOLUTION INTRAVENOUS at 05:26

## 2023-09-07 RX ADMIN — PROPOFOL 200 MG: 10 INJECTION, EMULSION INTRAVENOUS at 21:24

## 2023-09-07 ASSESSMENT — ACTIVITIES OF DAILY LIVING (ADL)
ADLS_ACUITY_SCORE: 37
ADLS_ACUITY_SCORE: 33
ADLS_ACUITY_SCORE: 37
ADLS_ACUITY_SCORE: 33
ADLS_ACUITY_SCORE: 37
ADLS_ACUITY_SCORE: 33

## 2023-09-07 NOTE — PHARMACY-ANTICOAGULATION SERVICE
Clinical Pharmacy - Warfarin Dosing Consult     Pharmacy has been consulted to manage this patient s warfarin therapy.  Indication: Other - Antiphospholipid antibody syndrome  Therapy Goal: Chr Factor 10: 20-40%(pt stated)  Warfarin Prior to Admission: Yes  Warfarin PTA Regimen: 7.5 mg Wed, 10 mg rest of week.  Significant drug interactions: Prednisone  Recent documented change in oral intake/nutrition: Unknown    INR   Date Value Ref Range Status   09/07/2023 1.09 0.85 - 1.15 Final   04/12/2022 1.08 0.85 - 1.15 Final     Chromogenic Factor 10   Date Value Ref Range Status   11/20/2015 36 (L) 70 - 130 % Final     Comment:     Therapeutic Range:  A Chromogenic Factor 10 level of approximately 20-40%   inversely correlates with an INR of 2-3 for patients receiving Warfarin.   Chromogenic Factor 10 levels below 20% indicate an INR greater than 3 and   levels above 40% indicate an INR less than 2.         No warfarin dose today as had stent placement per nephrology today and has had bleeding after previous stent placement.   Pharmacy will monitor Jim Flores daily and order warfarin doses to achieve specified goal.      Please contact pharmacy as soon as possible if the warfarin needs to be held for a procedure or if the warfarin goals change.

## 2023-09-07 NOTE — ED PROVIDER NOTES
History     Chief Complaint:  Flank Pain     HPI   Jim Flores is a 48 year old male, on Coumadin, with history of anticoagulation syndrome, nephrolithiasis, and lupus who presents for evaluation of flank pain amongst other symptoms.  Patient reports since yesterday having a nearly constant right-sided flank pain that is worsening in intensity.  He denies any radiation of the pain though reports accompanying nausea and hematuria.  He expresses concerns for recurrent kidney stone.  Patient also notes for the past week having intermittent chest pain and is currently being treated with 20 mg prednisone given concern for lupus flare.  He denies any fever, chills, cough, dyspnea, vomiting, significant abdominal pain or other symptoms.  Patient reports taking p.o. 2 mg Dilaudid prior to arrival.  He has required urologic intervention in the past.    Independent Historian:   None - Patient Only    Review of External Notes:    7/9/23 ED visit    Medications:    Atorvastatin  Fluticasone  Lorazepam  Propranolol  Tamsulosin  Venlafaxine  Hydrochlorothiazide  Hydrochloroquine  Prednisone  Warfarin     Past Medical History:    Antiphospholipid antibody positive  Anxiety  Hypercalciuria  Nephrolithiasis  Pulmonary infarct  Lupus  GERD  Hypercoagulation syndrome  Hyperparathyroidism     Past Surgical History:    Skin biopsy  Cystoscopy lithotripsy insert stent, bilateral  Incisional hernia repair  Parathyroidectomy     Physical Exam   Patient Vitals for the past 24 hrs:   BP Temp Temp src Pulse Resp SpO2   09/07/23 0304 (!) 160/99 97.4  F (36.3  C) Temporal 85 16 100 %      Physical Exam  Nursing note and vitals reviewed.  Constitutional: Well nourished.   Eyes: Conjunctiva normal.  Pupils are equal, round, and reactive to light.   ENT: Nose normal. Mucous membranes pink and moist.    Neck: Normal range of motion.  CVS: Normal rate, regular rhythm.  Normal heart sounds.    Pulmonary: Lungs clear to auscultation bilaterally. No  wheezes/rales/rhonchi.  GI: Abdomen soft. Nontender, nondistended. No rigidity or guarding. R. CVA tenderness   MSK: No calf tenderness or swelling.  Neuro: Alert. Follows simple commands.  Skin: Skin is warm and dry. No rash noted.   Psychiatric: Normal affect.       Emergency Department Course   ECG  ECG taken at 0411, ECG read at 0418  Normal sinus rhythm    Rate 91 bpm. DE interval 170 ms. QRS duration 102 ms. QT/QTc 380/4667 ms. P-R-T axes 15 -1 44.     Imaging:  CT Chest (PE) Abdomen Pelvis w Contrast    (Results Pending)       Laboratory:  Labs Ordered and Resulted from Time of ED Arrival to Time of ED Departure   BASIC METABOLIC PANEL - Abnormal       Result Value    Sodium 136      Potassium 3.6      Chloride 99      Carbon Dioxide (CO2) 26      Anion Gap 11      Urea Nitrogen 13.1      Creatinine 1.13      Calcium 9.0      Glucose 106 (*)     GFR Estimate 79     INR - Normal    INR 1.09     LACTIC ACID WHOLE BLOOD - Normal    Lactic Acid 1.2     TROPONIN T, HIGH SENSITIVITY - Normal    Troponin T, High Sensitivity 7     CBC WITH PLATELETS AND DIFFERENTIAL    WBC Count 9.4      RBC Count 4.96      Hemoglobin 14.2      Hematocrit 42.4      MCV 86      MCH 28.6      MCHC 33.5      RDW 13.2      Platelet Count 215      % Neutrophils 78      % Lymphocytes 11      % Monocytes 10      % Eosinophils 1      % Basophils 0      % Immature Granulocytes 0      NRBCs per 100 WBC 0      Absolute Neutrophils 7.3      Absolute Lymphocytes 1.0      Absolute Monocytes 0.9      Absolute Eosinophils 0.1      Absolute Basophils 0.0      Absolute Immature Granulocytes 0.0      Absolute NRBCs 0.0     ROUTINE UA WITH MICROSCOPIC     Emergency Department Course & Assessments:             Interventions:  Medications   0.9% sodium chloride BOLUS (0 mLs Intravenous Stopped 9/7/23 3691)   ondansetron (ZOFRAN) injection 4 mg (4 mg Intravenous $Given 9/7/23 6133)   HYDROmorphone (PF) (DILAUDID) injection 0.5 mg (0.5 mg Intravenous $Given  9/7/23 6942)   HYDROmorphone (PF) (DILAUDID) injection 0.5 mg (0.5 mg Intravenous $Given 9/7/23 0132)   sodium chloride for CT scan flush use (80 mLs Intravenous $Given 9/7/23 0981)   iopamidol (ISOVUE-370) solution 500 mL (60 mLs Intravenous $Given 9/7/23 2542)        Consultations/Discussion of Management or Tests:  None        Social Determinants of Health affecting care:   None    Disposition:  Care of the patient was transferred to my colleague Dr. Guevara pending UA and CT.     Impression & Plan      Medical Decision Making:  Patient is a 48-year-old male presenting with predominately complaints of flank pain as well as chest pain.  He is currently on outpatient prednisone for management of a lupus flare.  He is nontoxic on arrival.  He underwent extensive work-up during his time in the ED.  Regarding his chest pain, EKG without focal ischemia or underlying arrhythmia.  High-sensitivity screening troponin negative.  His presentation would be atypical of ACS given symptoms greater than 6 hours.  INR subtherapeutic so cannot exclude PE.  Regarding his flank pain, concern for renal calculi at this point in time.  At time of signout formal CT to exclude PE, pneumonia and intra-abdominal catastrophe pending.  UA pending as well.  Patient did report symptom improvement during his time in the ED.  He remained otherwise hemodynamically stable under my evaluation.  My partner will follow CT as well as UA pending final disposition.    Diagnosis:    ICD-10-CM    1. Chest pain, unspecified type  R07.9       2. Flank pain  R10.9            Discharge Medications:  New Prescriptions    No medications on file      Scribe Disclosure:  IGraciela, am serving as a scribe at 3:38 AM on 9/7/2023 to document services personally performed by Isi Nelson DO based on my observations and the provider's statements to me.     9/7/2023   Isi Nelson DO McDonald, Lindsey E, DO  09/07/23 9101

## 2023-09-07 NOTE — ED PROVIDER NOTES
This patient's care was signed out to me by Dr. Nelson to follow-up on the CT chest and abdomen and discharged him as appropriate.  I discussed the findings on his CT including the right ureteral calculus that is the cause of his right flank pain today as well as the incidental findings of trace pericardial effusion, cardiomegaly and left pleural effusion with him.  He was referred to urology for follow-up within the next few days.  Dr. Nelson prescribed him Flomax and Zofran and he was told to take his Dilaudid that he has at home.  He has history of lupus and pericarditis which likely account for the chest findings.  He was told to follow-up with his rheumatologist to call today for close follow-up appointment.  Since it was only trace pericardial effusion there would not be concern for cardiac tamponade and his vitals are stable.  I ordered an echocardiogram for him as well as a  cardiology follow-up appointment regarding these findings.  He was told to return if he develops shortness of breath, worsening chest pain or other concerning findings.  There was no sign of UTI.  I also discussed the incidental findings of bilateral for nephrolithiasis.    Addendum:  The patient was in too much pain to be discharged, so I consulted Urology and spoke with Dr. Barrientos who recommended to admit him for observation to the hospitalist service since he would not be able to take him to the operating room to remove the stone until late afternoon.  He recommended to keep the patient n.p.o.  He was admitted under the care of Dr. Iban Levin I spoke with Paris Blanco physician assistant.     Moni Minaya MD  09/07/23 7718       Moni Minaya MD  09/07/23 4713       Moni Minaya MD  09/07/23 5664

## 2023-09-07 NOTE — H&P (VIEW-ONLY)
[facility]  Internal Medicine  History and Physical      Patient Name: Jim Flores MRN# [id number]   Age: 48 year old YOB: 1975     Date of Admission:9/7/2023    Primary care provider: Good Mooney  Date of Service: 9/7/2023         Assessment and Plan:   Jim Flores is a 48 year old male with a history of HLD, Anxiety, GERD, Antiphospholipid Antibody, Anticardiolipin Antibody, PE, SLE, Pericarditis, Sjogren's Syndrome, Nephrolithiasis who presented to the ED today with right sided flank pain.    Acute Renal Colic - afebrile, normal wbc and creatinine.  UA does not appear c/w infection.  CT revealed a 6 mm stone in the right ureteropelvic junction with mild right hydronephrosis. Multiple bilateral nonobstructing renal stones measuring up to 5 mm on the left.  Mild urothelial thickening and enhancement of the left renal pelvis and ureter.  - IVF, antiemetics, analgesics prn  - strain urine  - start Flomax  - add on urine culture  - Urology consult    SLE - diagnosed 2013.  Followed by Rheumatology on chronic Prednisone.  Pt reports a flare of symptoms recently - fatigue, pericarditis, joint pains.  His Rheumatologist started Colchicine and his Prednisone is increased to 20mg daily.  CT chest today revealed trace pericardial effusion.   - continue pta meds and close follow up with Rheumatology is recommended.    Hx Antiphospholipid Antibody  Hx of PE - CT chest negative for PE today.  On warfarin pta.  INR 1.09   - pharmacy consulted for warfarin dosing    HLD - resume Atorvastatin upon discharge    Anxiety - continue Effexor.  Resume prn Propranolol and Ativan upon discharge    CODE: full  Diet/IVF: npo, NS  DVT ppx: on warfarin    Paris Rodrigues MS PA-C  Physician Assistant   Hospitalist Service    Awaiting formal pharmacy med rec to confirm home medications.         Chief Complaint:   Right sided flank pain         HPI:   48 year old male with a history of HLD, Anxiety, GERD,  Antiphospholipid Antibody, Anticardiolipin Antibody, PE, SLE, Pericarditis, Sjogren's Syndrome, Nephrolithiasis who presented to the ED today with right sided flank pain.    Patient reports he developed right sided flank pain with radiatio to the right groin yesterday.  He denies any dysuria or fevers.  He has noticed pink tinged urine.  He has had kidney stones in the past and this felt similar.  Additionally, he is followed by Rheumatology and is currently on an increased dose of Prednisone and is on Colchicine for and SLE flare with associated pericarditis.          Past Medical History:     Past Medical History:   Diagnosis Date    Antiphospholipid antibody positive     Anxiety state, unspecified     Cardiomegaly 9/7/2023    Hypercalciuria     Nephrolithiasis     Pulmonary infarct (H) 9/1/2008    SLE with normal kidneys (H)           Past Surgical History:     Past Surgical History:   Procedure Laterality Date    BIOPSY  2014, 2021    Mole check - all good    COLONOSCOPY  11/19/2012    Procedure: COLONOSCOPY;  Colonoscopy;  Surgeon: Lupe Ratliff MD;  Location:  GI    COLONOSCOPY Left 4/12/2022    Procedure: COLONOSCOPY, FLEXIBLE, WITH POLYP REMOVAL USING HOT SNARE;  Surgeon: Lupe Ratliff MD;  Location: [facility] GI    COMBINED CYSTOSCOPY, RETROGRADES, URETEROSCOPY, LASER HOLMIUM LITHOTRIPSY URETER(S), INSERT STENT Left 6/24/2015    Procedure: COMBINED CYSTOSCOPY, RETROGRADES, URETEROSCOPY, LASER HOLMIUM LITHOTRIPSY URETER(S), INSERT STENT;  Surgeon: Ramesh Johnson MD;  Location: [facility] OR    GENITOURINARY SURGERY  2015 2018    kidney stone lithotripsy    HEAD & NECK SURGERY  2018    parathyroidectomy    [facility] stone remov      at [place] March 12 2018    LASER HOLMIUM LITHOTRIPSY URETER(S), INSERT STENT, COMBINED Right 11/11/2015    Procedure: COMBINED CYSTOSCOPY, URETEROSCOPY, LASER HOLMIUM LITHOTRIPSY URETER(S), INSERT STENT;  Surgeon: Ramesh Johnson MD;  Location: [facility] OR    [facility] REPAIR  INCISIONAL HERNIA,REDUCIBLE  1991    Hernia Repair, Incisional, Unilateral          Social History:     Social History     Socioeconomic History    Marital status:      Spouse name: Not on file    Number of children: 1    Years of education: Not on file    Highest education level: Master's degree (e.g., MA, MS, Vicki, MEd, MSW, GILMAR)   Occupational History    Occupation: [employer]--elct      Employer: [employer]   Tobacco Use    Smoking status: Never    Smokeless tobacco: Never   Vaping Use    Vaping Use: Never used   Substance and Sexual Activity    Alcohol use: Yes     Alcohol/week: 0.0 standard drinks of alcohol     Comment: three per week    Drug use: No    Sexual activity: Yes     Partners: Female     Birth control/protection: I.U.D.   Other Topics Concern    Parent/sibling w/ CABG, MI or angioplasty before 65F 55M? No   Social History Narrative    Not on file     Social Determinants of Health     Financial Resource Strain: Low Risk  (11/19/2022)    Overall Financial Resource Strain (CARDIA)     Difficulty of Paying Living Expenses: Not very hard   Food Insecurity: No Food Insecurity (11/19/2022)    Hunger Vital Sign     Worried About Running Out of Food in the Last Year: Never true     Ran Out of Food in the Last Year: Never true   Transportation Needs: No Transportation Needs (11/19/2022)    PRAPARE - Transportation     Lack of Transportation (Medical): No     Lack of Transportation (Non-Medical): No   Physical Activity: Insufficiently Active (11/19/2022)    Exercise Vital Sign     Days of Exercise per Week: 1 day     Minutes of Exercise per Session: 20 min   Stress: Stress Concern Present (11/19/2022)    Citizen of [place] of Occupational Health - Occupational Stress Questionnaire     Feeling of Stress : To some extent   Social Connections: Moderately Isolated (11/19/2022)    Social Connection and Isolation Panel [NHANES]     Frequency of Communication with Friends and  Family: Twice a week     Frequency of Social Gatherings with Friends and Family: Once a week     Attends Mormon Services: Never     Active Member of Clubs or Organizations: No     Attends Club or Organization Meetings: Not on file     Marital Status:    Intimate Partner Violence: Not on file   Housing Stability: Low Risk  (11/19/2022)    Housing Stability Vital Sign     Unable to Pay for Housing in the Last Year: No     Number of Places Lived in the Last Year: 1     Unstable Housing in the Last Year: No          Family History:     Family History   Problem Relation Age of Onset    Cancer Mother         thyroid and uterine    Hyperlipidemia Mother     Other Cancer Mother         Ovarian    Thyroid Disease Mother     Diabetes Father     Anxiety Disorder Father     Prostate Cancer Maternal Grandfather     Arthritis Paternal Grandmother         Type not known    Cardiovascular Paternal Grandfather         CHF    Gastrointestinal Disease Daughter         Celiac disease    Colon Cancer No family hx of           Allergies:      Allergies   Allergen Reactions    Ampicillin Hives    Bactrim Hives    Cefuroxime      Headache, stiff neck and joint pain    Cipro [Ciprofloxacin]      Stiff neck, joint pain, muscle aches    Ketorolac Tromethamine Itching    Rodolfo Flavor Unknown     rodolfo fruit gives lip swelling, hives    Penicillins Hives and Unknown    Poison Ivy Extract Unknown    Sulfamethoxazole-Trimethoprim Unknown          Medications:     Prior to Admission medications    Medication Sig Last Dose Taking? Auth Provider Long Term End Date   ondansetron (ZOFRAN ODT) 4 MG ODT tab Take 1 tablet (4 mg) by mouth every 8 hours as needed for nausea  Yes Isi Nelson DO  9/10/23   tamsulosin (FLOMAX) 0.4 MG capsule Take 1 capsule (0.4 mg) by mouth daily for 10 doses  Yes Isi Nelson, DO No 9/17/23   venlafaxine (EFFEXOR XR) 150 MG 24 hr capsule TAKE TWO CAPSULES BY MOUTH ONCE DAILY   Good Mooney MD Yes     atorvastatin (LIPITOR) 10 MG tablet Take 1 tablet (10 mg) by mouth daily   Good Mooney MD Yes    fluticasone (FLONASE) 50 MCG/ACT nasal spray Spray 1-2 sprays into both nostrils daily   Good Mooney MD No    hydrochlorothiazide (HYDRODIURIL) 12.5 MG tablet Take 12.5 mg by mouth daily   Reported, Patient No    hydroxychloroquine (PLAQUENIL) 200 MG tablet Take 200 mg by mouth 2 times daily.   Reported, Patient     LORazepam (ATIVAN) 0.5 MG tablet Take 1 tablet (0.5 mg) by mouth every 6 hours as needed for anxiety   Good Mooney MD     NONFORMULARY Take 2 capsules by mouth every morning Methyl Guard Plus   Unknown, Entered By History     Omega-3 Fatty Acids (FISH OIL) 1200 MG CPDR Take 2,400 Units by mouth daily   Unknown, Entered By History     predniSONE (DELTASONE) 5 MG tablet Take 1.5 tablets (7.5 mg) by mouth daily   Good Mooney MD Yes    propranolol (INDERAL) 10 MG tablet Take 1 tablet (10 mg) by mouth 2 times daily as needed (anxiety)   Good Mooney MD Yes    tamsulosin (FLOMAX) 0.4 MG capsule Take 1 capsule (0.4 mg) by mouth daily as needed (kidney stone symptoms)   Good Mooney MD No    VITAMIN D, CHOLECALCIFEROL, PO Take 2,000 Units by mouth 2 times daily   Unknown, Entered By History     warfarin (COUMADIN) 10 MG tablet TK 1.5 TS D OR AS DIRECTED BASED ON LAB RESULTS   Reported, Patient Yes           Review of Systems:   A complete ROS was performed and is negative other than what is stated in the HPI.       Physical Exam:   Blood pressure (!) 140/94, pulse 93, temperature 97.4  F (36.3  C), temperature source Temporal, resp. rate 16, SpO2 93 %.  General: Alert, interactive, NAD  HEENT: AT/NC  Chest/Resp: clear to auscultation bilaterally, no crackles or wheezes  Heart/CV: regular rate and rhythm, no murmur  Abdomen/GI: Soft, mild right flank tenderness, nondistended. +BS.  No rebound or guarding.  Extremities/MSK: No LE edema  Skin: Warm and dry  Neuro: Alert & oriented x 3, no focal  deficits, moves all extremities equally         Labs:   ROUTINE ICU LABS (Last four results)  CMP  Recent Labs   Lab 09/07/23  0337      POTASSIUM 3.6   CHLORIDE 99   CO2 26   ANIONGAP 11   *   BUN 13.1   CR 1.13   GFRESTIMATED 79   SANDY 9.0     CBC  Recent Labs   Lab 09/07/23  0337   WBC 9.4   RBC 4.96   HGB 14.2   HCT 42.4   MCV 86   MCH 28.6   MCHC 33.5   RDW 13.2        INR  Recent Labs   Lab 09/07/23  0337   INR 1.09     Arterial Blood GasNo lab results found in last 7 days.       Imaging/Procedures:     Results for orders placed or performed during the hospital encounter of 09/07/23   CT Chest (PE) Abdomen Pelvis w Contrast    Narrative    EXAM: CT CHEST PE ABDOMEN PELVIS W CONTRAST  LOCATION: [facility]  DATE: 9/7/2023    INDICATION: chest pain and left flank pain  COMPARISON: None.  TECHNIQUE: CT chest pulmonary angiogram and routine CT abdomen pelvis with IV contrast. Arterial phase through the chest and venous phase through the abdomen and pelvis. Multiplanar reformats and MIP reconstructions were performed. Dose reduction   techniques were used.   CONTRAST: 60 mL Isovue 370    FINDINGS:  ANGIOGRAM CHEST: Pulmonary arteries are normal caliber and negative for pulmonary emboli. Thoracic aorta is negative for dissection. No CT evidence of right heart strain.     LUNGS AND PLEURA: Scattered mild atelectasis in the lung bases. No confluent consolidations. Trace left pleural effusion is present. No right pleural effusion. No pneumothorax.    MEDIASTINUM/AXILLAE: No lymphadenopathy. Heart size is mildly enlarged with trace pericardial effusion.    CORONARY ARTERY CALCIFICATION: Mild in the left coronary artery distribution..    HEPATOBILIARY: Normal.    PANCREAS: Normal.    SPLEEN: Normal.    ADRENAL GLANDS: Normal.    KIDNEYS/BLADDER: A 6 mm stone is seen in the right ureteropelvic junction with mild right hydronephrosis. [id number] stone seen in both kidneys,  nonobstructing, largest in the left kidney measuring 5 mm. Mild urothelial thickening and enhancement seen   in the left renal pelvis. Lesion in the    BOWEL: Normal, including the appendix.    LYMPH NODES: Normal.    VASCULATURE: Minimal aortoiliac atherosclerotic calcifications. No abdominal aortic aneurysm.    PELVIC ORGANS: Normal.    MUSCULOSKELETAL: Mild anterior wedge compression deformity of T8. No suspicious osseous lesions or acute fractures.        Impression    IMPRESSION:    1.  No acute findings in the chest including pulmonary embolism or pneumonia.    2.  Mild cardiomegaly with trace pericardial and trace left pleural effusions.    3.  6 mm stone in the right ureteropelvic junction with mild right hydronephrosis.    4.  Multiple bilateral nonobstructing renal stones measuring up to 5 mm on the left.    5.  Mild urothelial thickening and enhancement of the left renal pelvis and ureter. Recommend clinical correlation with urinalysis for upper urinary tract infection.

## 2023-09-07 NOTE — ED NOTES
[facility]  ED Nurse Handoff Report    ED Chief complaint: Flank Pain  . ED Diagnosis:   Final diagnoses:   Chest pain, unspecified type   Right ureteral stone   Pericardial effusion   Cardiomegaly   Pleural effusion on left   Bilateral nephrolithiasis       Allergies:   Allergies   Allergen Reactions    Ampicillin Hives    Bactrim Hives    Cefuroxime      Headache, stiff neck and joint pain    Cipro [Ciprofloxacin]      Stiff neck, joint pain, muscle aches    Ketorolac Tromethamine Itching    Rodolfo Flavor Unknown     rodolfo fruit gives lip swelling, hives    Penicillins Hives and Unknown    Poison Ivy Extract Unknown    Sulfamethoxazole-Trimethoprim Unknown       Code Status: Full Code    Activity level - Baseline/Home:  independent.  Activity Level - Current:   independent.   Lift room needed: No.   Bariatric: No   Needed: No   Isolation: No.   Infection: Not Applicable.     Respiratory status: Room air    Vital Signs (within 30 minutes):   Vitals:    09/07/23 0415 09/07/23 0430 09/07/23 0600 09/07/23 0615   BP:  (!) 144/92 (!) 140/101 133/84   Pulse:       Resp:       Temp:       TempSrc:       SpO2: 97% 92% 96% 95%       Cardiac Rhythm:  ,      Pain level:    Patient confused: No.   Patient Falls Risk: nonskid shoes/slippers when out of bed.   Elimination Status: Has voided     Patient Report - Initial Complaint: Flank pain.   Focused Assessment: Jim Flores is a 48 year old male, on Coumadin, with history of anticoagulation syndrome, nephrolithiasis, and lupus who presents for evaluation of flank pain amongst other symptoms.  Patient reports since yesterday having a nearly constant right-sided flank pain that is worsening in intensity.  He denies any radiation of the pain though reports accompanying nausea and hematuria.  He expresses concerns for recurrent kidney stone.  Patient also notes for the past week having intermittent chest pain and is currently being treated with 20  mg prednisone given concern for lupus flare.  He denies any fever, chills, cough, dyspnea, vomiting, significant abdominal pain or other symptoms.  Patient reports taking p.o. 2 mg Dilaudid prior to arrival.  He has required urologic intervention in the past.        Abnormal Results:   Labs Ordered and Resulted from Time of ED Arrival to Time of ED Departure   BASIC METABOLIC PANEL - Abnormal       Result Value    Sodium 136      Potassium 3.6      Chloride 99      Carbon Dioxide (CO2) 26      Anion Gap 11      Urea Nitrogen 13.1      Creatinine 1.13      Calcium 9.0      Glucose 106 (*)     GFR Estimate 79     ROUTINE UA WITH MICROSCOPIC - Abnormal    Color Urine Yellow      Appearance Urine Clear      Glucose Urine Negative      Bilirubin Urine Negative      Ketones Urine Negative      Specific Gravity Urine 1.017      Blood Urine Moderate (*)     pH Urine 6.0      Protein Albumin Urine 10 (*)     Urobilinogen Urine Normal      Nitrite Urine Negative      Leukocyte Esterase Urine Negative      Mucus Urine Present (*)     Sperm Urine Present (*)     RBC Urine 68 (*)     WBC Urine 3     INR - Normal    INR 1.09     LACTIC ACID WHOLE BLOOD - Normal    Lactic Acid 1.2     TROPONIN T, HIGH SENSITIVITY - Normal    Troponin T, High Sensitivity 7     CBC WITH PLATELETS AND DIFFERENTIAL    WBC Count 9.4      RBC Count 4.96      Hemoglobin 14.2      Hematocrit 42.4      MCV 86      MCH 28.6      MCHC 33.5      RDW 13.2      Platelet Count 215      % Neutrophils 78      % Lymphocytes 11      % Monocytes 10      % Eosinophils 1      % Basophils 0      % Immature Granulocytes 0      NRBCs per 100 WBC 0      Absolute Neutrophils 7.3      Absolute Lymphocytes 1.0      Absolute Monocytes 0.9      Absolute Eosinophils 0.1      Absolute Basophils 0.0      Absolute Immature Granulocytes 0.0      Absolute NRBCs 0.0          CT Chest (PE) Abdomen Pelvis w Contrast   Final Result   IMPRESSION:      1.  No acute findings in the chest  including pulmonary embolism or pneumonia.      2.  Mild cardiomegaly with trace pericardial and trace left pleural effusions.      3.  6 mm stone in the right ureteropelvic junction with mild right hydronephrosis.      4.  Multiple bilateral nonobstructing renal stones measuring up to 5 mm on the left.      5.  Mild urothelial thickening and enhancement of the left renal pelvis and ureter. Recommend clinical correlation with urinalysis for upper urinary tract infection.      Echocardiogram Complete    (Results Pending)       Treatments provided: CT scan, fluids, Dilaudid, blood work  Family Comments: No one at bedside  OBS brochure/video discussed/provided to patient:  Yes  ED Medications:   Medications   HYDROmorphone (PF) (DILAUDID) injection 0.5 mg (has no administration in time range)   0.9% sodium chloride BOLUS (0 mLs Intravenous Stopped 9/7/23 0447)   ondansetron (ZOFRAN) injection 4 mg (4 mg Intravenous $Given 9/7/23 0329)   HYDROmorphone (PF) (DILAUDID) injection 0.5 mg (0.5 mg Intravenous $Given 9/7/23 0332)   HYDROmorphone (PF) (DILAUDID) injection 0.5 mg (0.5 mg Intravenous $Given 9/7/23 0425)   sodium chloride for CT scan flush use (80 mLs Intravenous $Given 9/7/23 0527)   iopamidol (ISOVUE-370) solution 500 mL (60 mLs Intravenous $Given 9/7/23 0526)   oxyCODONE (ROXICODONE) tablet 5 mg (5 mg Oral $Given 9/7/23 0545)       Drips infusing:  No  For the majority of the shift this patient was Green.   Interventions performed were NA.    Sepsis treatment initiated: No    Cares/treatment/interventions/medications to be completed following ED care: See MAR    ED Nurse Name: Nadine Rose RN  7:04 AM  RECEIVING UNIT ED HANDOFF REVIEW    Above ED Nurse Handoff Report was reviewed: Yes  Reviewed by: Lamar Kennedy, RN on September 7, 2023 at 1:03 PM

## 2023-09-07 NOTE — PLAN OF CARE
"PRIMARY DIAGNOSIS: ACUTE RENAL COLIC    OUTPATIENT/OBSERVATION GOALS TO BE MET BEFORE DISCHARGE  1. Pain Status: Improved-controlled with oral pain medications.    2. Tolerating adequate PO diet: NPO    3. Surgical Intervention planned: Yes    4. Cleared by consultants (if involved): No    5. Return to near baseline physical activity: Yes    Discharge Planner Nurse   Safe discharge environment identified: Yes  Barriers to discharge: Yes       Entered by: Skye Navarro RN 09/07/2023 3:39 PM  Patient is A&O x 4. Up independent. Diet NPO for procedure. Pain Flank/ headache 4/10 pt received tylenol. NS running 100ml/ hr. Scheduled for procedure 7:20 today. Urology following.   /84 (BP Location: Left arm)   Pulse 82   Temp 97.7  F (36.5  C) (Oral)   Resp 16   Ht 1.753 m (5' 9\")   Wt 78.5 kg (173 lb)   SpO2 95%   BMI 25.55 kg/m      Please review provider order for any additional goals.   Nurse to notify provider when observation goals have been met and patient is ready for discharge.Goal Outcome Evaluation:                        "

## 2023-09-07 NOTE — H&P
[facility]  Internal Medicine  History and Physical      Patient Name: Jim Flores MRN# [id number]   Age: 48 year old YOB: 1975     Date of Admission:9/7/2023    Primary care provider: Good Mooney  Date of Service: 9/7/2023         Assessment and Plan:   Jim Flores is a 48 year old male with a history of HLD, Anxiety, GERD, Antiphospholipid Antibody, Anticardiolipin Antibody, PE, SLE, Pericarditis, Sjogren's Syndrome, Nephrolithiasis who presented to the ED today with right sided flank pain.    Acute Renal Colic - afebrile, normal wbc and creatinine.  UA does not appear c/w infection.  CT revealed a 6 mm stone in the right ureteropelvic junction with mild right hydronephrosis. Multiple bilateral nonobstructing renal stones measuring up to 5 mm on the left.  Mild urothelial thickening and enhancement of the left renal pelvis and ureter.  - IVF, antiemetics, analgesics prn  - strain urine  - start Flomax  - add on urine culture  - Urology consult    SLE - diagnosed 2013.  Followed by Rheumatology on chronic Prednisone.  Pt reports a flare of symptoms recently - fatigue, pericarditis, joint pains.  His Rheumatologist started Colchicine and his Prednisone is increased to 20mg daily.  CT chest today revealed trace pericardial effusion.   - continue pta meds and close follow up with Rheumatology is recommended.    Hx Antiphospholipid Antibody  Hx of PE - CT chest negative for PE today.  On warfarin pta.  INR 1.09   - pharmacy consulted for warfarin dosing    HLD - resume Atorvastatin upon discharge    Anxiety - continue Effexor.  Resume prn Propranolol and Ativan upon discharge    CODE: full  Diet/IVF: npo, NS  DVT ppx: on warfarin    Paris Rodrigues MS PA-C  Physician Assistant   Hospitalist Service    Awaiting formal pharmacy med rec to confirm home medications.         Chief Complaint:   Right sided flank pain         HPI:   48 year old male with a history of HLD, Anxiety, GERD,  Antiphospholipid Antibody, Anticardiolipin Antibody, PE, SLE, Pericarditis, Sjogren's Syndrome, Nephrolithiasis who presented to the ED today with right sided flank pain.    Patient reports he developed right sided flank pain with radiatio to the right groin yesterday.  He denies any dysuria or fevers.  He has noticed pink tinged urine.  He has had kidney stones in the past and this felt similar.  Additionally, he is followed by Rheumatology and is currently on an increased dose of Prednisone and is on Colchicine for and SLE flare with associated pericarditis.          Past Medical History:     Past Medical History:   Diagnosis Date    Antiphospholipid antibody positive     Anxiety state, unspecified     Cardiomegaly 9/7/2023    Hypercalciuria     Nephrolithiasis     Pulmonary infarct (H) 9/1/2008    SLE with normal kidneys (H)           Past Surgical History:     Past Surgical History:   Procedure Laterality Date    BIOPSY  2014, 2021    Mole check - all good    COLONOSCOPY  11/19/2012    Procedure: COLONOSCOPY;  Colonoscopy;  Surgeon: Lupe Ratliff MD;  Location:  GI    COLONOSCOPY Left 4/12/2022    Procedure: COLONOSCOPY, FLEXIBLE, WITH POLYP REMOVAL USING HOT SNARE;  Surgeon: Lupe Ratliff MD;  Location: [facility] GI    COMBINED CYSTOSCOPY, RETROGRADES, URETEROSCOPY, LASER HOLMIUM LITHOTRIPSY URETER(S), INSERT STENT Left 6/24/2015    Procedure: COMBINED CYSTOSCOPY, RETROGRADES, URETEROSCOPY, LASER HOLMIUM LITHOTRIPSY URETER(S), INSERT STENT;  Surgeon: Ramesh Johnson MD;  Location: [facility] OR    GENITOURINARY SURGERY  2015 2018    kidney stone lithotripsy    HEAD & NECK SURGERY  2018    parathyroidectomy    [facility] stone remov      at [place] March 12 2018    LASER HOLMIUM LITHOTRIPSY URETER(S), INSERT STENT, COMBINED Right 11/11/2015    Procedure: COMBINED CYSTOSCOPY, URETEROSCOPY, LASER HOLMIUM LITHOTRIPSY URETER(S), INSERT STENT;  Surgeon: Ramesh Johnson MD;  Location: [facility] OR    [facility] REPAIR  INCISIONAL HERNIA,REDUCIBLE  1991    Hernia Repair, Incisional, Unilateral          Social History:     Social History     Socioeconomic History    Marital status:      Spouse name: Not on file    Number of children: 1    Years of education: Not on file    Highest education level: Master's degree (e.g., MA, MS, Vicki, MEd, MSW, GILMAR)   Occupational History    Occupation: [employer]--elct      Employer: [employer]   Tobacco Use    Smoking status: Never    Smokeless tobacco: Never   Vaping Use    Vaping Use: Never used   Substance and Sexual Activity    Alcohol use: Yes     Alcohol/week: 0.0 standard drinks of alcohol     Comment: three per week    Drug use: No    Sexual activity: Yes     Partners: Female     Birth control/protection: I.U.D.   Other Topics Concern    Parent/sibling w/ CABG, MI or angioplasty before 65F 55M? No   Social History Narrative    Not on file     Social Determinants of Health     Financial Resource Strain: Low Risk  (11/19/2022)    Overall Financial Resource Strain (CARDIA)     Difficulty of Paying Living Expenses: Not very hard   Food Insecurity: No Food Insecurity (11/19/2022)    Hunger Vital Sign     Worried About Running Out of Food in the Last Year: Never true     Ran Out of Food in the Last Year: Never true   Transportation Needs: No Transportation Needs (11/19/2022)    PRAPARE - Transportation     Lack of Transportation (Medical): No     Lack of Transportation (Non-Medical): No   Physical Activity: Insufficiently Active (11/19/2022)    Exercise Vital Sign     Days of Exercise per Week: 1 day     Minutes of Exercise per Session: 20 min   Stress: Stress Concern Present (11/19/2022)    [place] [employer] - Occupational Stress Questionnaire     Feeling of Stress : To some extent   Social Connections: Moderately Isolated (11/19/2022)    Social Connection and Isolation Panel [NHANES]     Frequency of Communication with Friends and  Family: Twice a week     Frequency of Social Gatherings with Friends and Family: Once a week     Attends Oriental orthodox Services: Never     Active Member of Clubs or Organizations: No     Attends Club or Organization Meetings: Not on file     Marital Status:    Intimate Partner Violence: Not on file   Housing Stability: Low Risk  (11/19/2022)    Housing Stability Vital Sign     Unable to Pay for Housing in the Last Year: No     Number of Places Lived in the Last Year: 1     Unstable Housing in the Last Year: No          Family History:     Family History   Problem Relation Age of Onset    Cancer Mother         thyroid and uterine    Hyperlipidemia Mother     Other Cancer Mother         Ovarian    Thyroid Disease Mother     Diabetes Father     Anxiety Disorder Father     Prostate Cancer Maternal Grandfather     Arthritis Paternal Grandmother         Type not known    Cardiovascular Paternal Grandfather         CHF    Gastrointestinal Disease Daughter         Celiac disease    Colon Cancer No family hx of           Allergies:      Allergies   Allergen Reactions    Ampicillin Hives    Bactrim Hives    Cefuroxime      Headache, stiff neck and joint pain    Cipro [Ciprofloxacin]      Stiff neck, joint pain, muscle aches    Ketorolac Tromethamine Itching    Rodolfo Flavor Unknown     rodolfo fruit gives lip swelling, hives    Penicillins Hives and Unknown    Poison Ivy Extract Unknown    Sulfamethoxazole-Trimethoprim Unknown          Medications:     Prior to Admission medications    Medication Sig Last Dose Taking? Auth Provider Long Term End Date   ondansetron (ZOFRAN ODT) 4 MG ODT tab Take 1 tablet (4 mg) by mouth every 8 hours as needed for nausea  Yes Isi Nelson DO  9/10/23   tamsulosin (FLOMAX) 0.4 MG capsule Take 1 capsule (0.4 mg) by mouth daily for 10 doses  Yes Isi Nelson, DO No 9/17/23   venlafaxine (EFFEXOR XR) 150 MG 24 hr capsule TAKE TWO CAPSULES BY MOUTH ONCE DAILY   Good Mooney MD Yes     atorvastatin (LIPITOR) 10 MG tablet Take 1 tablet (10 mg) by mouth daily   Good Mooney MD Yes    fluticasone (FLONASE) 50 MCG/ACT nasal spray Spray 1-2 sprays into both nostrils daily   Good Mooney MD No    hydrochlorothiazide (HYDRODIURIL) 12.5 MG tablet Take 12.5 mg by mouth daily   Reported, Patient No    hydroxychloroquine (PLAQUENIL) 200 MG tablet Take 200 mg by mouth 2 times daily.   Reported, Patient     LORazepam (ATIVAN) 0.5 MG tablet Take 1 tablet (0.5 mg) by mouth every 6 hours as needed for anxiety   Good Mooney MD     NONFORMULARY Take 2 capsules by mouth every morning Methyl Guard Plus   Unknown, Entered By History     Omega-3 Fatty Acids (FISH OIL) 1200 MG CPDR Take 2,400 Units by mouth daily   Unknown, Entered By History     predniSONE (DELTASONE) 5 MG tablet Take 1.5 tablets (7.5 mg) by mouth daily   Good Mooney MD Yes    propranolol (INDERAL) 10 MG tablet Take 1 tablet (10 mg) by mouth 2 times daily as needed (anxiety)   Good Mooney MD Yes    tamsulosin (FLOMAX) 0.4 MG capsule Take 1 capsule (0.4 mg) by mouth daily as needed (kidney stone symptoms)   Good Mooney MD No    VITAMIN D, CHOLECALCIFEROL, PO Take 2,000 Units by mouth 2 times daily   Unknown, Entered By History     warfarin (COUMADIN) 10 MG tablet TK 1.5 TS D OR AS DIRECTED BASED ON LAB RESULTS   Reported, Patient Yes           Review of Systems:   A complete ROS was performed and is negative other than what is stated in the HPI.       Physical Exam:   Blood pressure (!) 140/94, pulse 93, temperature 97.4  F (36.3  C), temperature source Temporal, resp. rate 16, SpO2 93 %.  General: Alert, interactive, NAD  HEENT: AT/NC  Chest/Resp: clear to auscultation bilaterally, no crackles or wheezes  Heart/CV: regular rate and rhythm, no murmur  Abdomen/GI: Soft, mild right flank tenderness, nondistended. +BS.  No rebound or guarding.  Extremities/MSK: No LE edema  Skin: Warm and dry  Neuro: Alert & oriented x 3, no focal  deficits, moves all extremities equally         Labs:   ROUTINE ICU LABS (Last four results)  CMP  Recent Labs   Lab 09/07/23  0337      POTASSIUM 3.6   CHLORIDE 99   CO2 26   ANIONGAP 11   *   BUN 13.1   CR 1.13   GFRESTIMATED 79   SANDY 9.0     CBC  Recent Labs   Lab 09/07/23  0337   WBC 9.4   RBC 4.96   HGB 14.2   HCT 42.4   MCV 86   MCH 28.6   MCHC 33.5   RDW 13.2        INR  Recent Labs   Lab 09/07/23  0337   INR 1.09     Arterial Blood GasNo lab results found in last 7 days.       Imaging/Procedures:     Results for orders placed or performed during the hospital encounter of 09/07/23   CT Chest (PE) Abdomen Pelvis w Contrast    Narrative    EXAM: CT CHEST PE ABDOMEN PELVIS W CONTRAST  LOCATION: [facility]  DATE: 9/7/2023    INDICATION: chest pain and left flank pain  COMPARISON: None.  TECHNIQUE: CT chest pulmonary angiogram and routine CT abdomen pelvis with IV contrast. Arterial phase through the chest and venous phase through the abdomen and pelvis. Multiplanar reformats and MIP reconstructions were performed. Dose reduction   techniques were used.   CONTRAST: 60 mL Isovue 370    FINDINGS:  ANGIOGRAM CHEST: Pulmonary arteries are normal caliber and negative for pulmonary emboli. Thoracic aorta is negative for dissection. No CT evidence of right heart strain.     LUNGS AND PLEURA: Scattered mild atelectasis in the lung bases. No confluent consolidations. Trace left pleural effusion is present. No right pleural effusion. No pneumothorax.    MEDIASTINUM/AXILLAE: No lymphadenopathy. Heart size is mildly enlarged with trace pericardial effusion.    CORONARY ARTERY CALCIFICATION: Mild in the left coronary artery distribution..    HEPATOBILIARY: Normal.    PANCREAS: Normal.    SPLEEN: Normal.    ADRENAL GLANDS: Normal.    KIDNEYS/BLADDER: A 6 mm stone is seen in the right ureteropelvic junction with mild right hydronephrosis. [id number] stone seen in both kidneys,  nonobstructing, largest in the left kidney measuring 5 mm. Mild urothelial thickening and enhancement seen   in the left renal pelvis. Lesion in the    BOWEL: Normal, including the appendix.    LYMPH NODES: Normal.    VASCULATURE: Minimal aortoiliac atherosclerotic calcifications. No abdominal aortic aneurysm.    PELVIC ORGANS: Normal.    MUSCULOSKELETAL: Mild anterior wedge compression deformity of T8. No suspicious osseous lesions or acute fractures.        Impression    IMPRESSION:    1.  No acute findings in the chest including pulmonary embolism or pneumonia.    2.  Mild cardiomegaly with trace pericardial and trace left pleural effusions.    3.  6 mm stone in the right ureteropelvic junction with mild right hydronephrosis.    4.  Multiple bilateral nonobstructing renal stones measuring up to 5 mm on the left.    5.  Mild urothelial thickening and enhancement of the left renal pelvis and ureter. Recommend clinical correlation with urinalysis for upper urinary tract infection.

## 2023-09-07 NOTE — PHARMACY-ADMISSION MEDICATION HISTORY
Pharmacist Admission Medication History    Admission medication history is complete. The information provided in this note is only as accurate as the sources available at the time of the update.    Medication reconciliation/reorder completed by provider prior to medication history? Yes    Information Source(s): Patient and CareEverywhere/SureScripts via in-person    Pertinent Information: Pt started prednisone 20mg daily taper dose on 9/4/2023 due to flare up, Pt to resume 7.5mg daily dose afterwards.    Changes made to PTA medication list:  Added: Ketconazole Shampoo, Triamcinolone cream, updated Warfarin dosing.  Deleted: HCTZ, Flomax  Changed:              -Methyl Guard plus 2 caps daily-->1 cap bid             -Effexor XR 300mg once daily-->150mg BID             -Vit.D 2000u bid-->1000u daily             -Scheduled Flonase-->PRN    Medication Affordability:  Not including over the counter (OTC) medications, was there a time in the past 3 months when you did not take your medications as prescribed because of cost?: No    Allergies reviewed with patient and updates made in EHR: yes    Medication History Completed By: Dayan Woodruff RPH 9/7/2023 1:48 PM    Prior to Admission medications    Medication Sig Last Dose Taking? Auth Provider Long Term End Date   atorvastatin (LIPITOR) 10 MG tablet Take 1 tablet (10 mg) by mouth daily 9/6/2023 at pm Yes Good Mooney MD Yes    fluticasone (FLONASE) 50 MCG/ACT nasal spray Spray 1-2 sprays into both nostrils daily as needed for rhinitis or allergies prn Yes Unknown, Entered By History No    hydroxychloroquine (PLAQUENIL) 200 MG tablet Take 200 mg by mouth 2 times daily. 9/6/2023 at pm Yes Reported, Patient     ketoconazole (NIZORAL) 2 % external shampoo Use topically 1-2 times a week 9/5/2023 Yes Unknown, Entered By History     LORazepam (ATIVAN) 0.5 MG tablet Take 1 tablet (0.5 mg) by mouth every 6 hours as needed for anxiety More than a month Yes Good Mooney MD      NONFORMULARY Take 1 capsule by mouth 2 times daily Methyl Guard Plus 9/6/2023 at pm Yes Unknown, Entered By History     Omega-3 Fatty Acids (FISH OIL) 1200 MG CPDR Take 1,200 Units by mouth 2 times daily 9/6/2023 at pm Yes Unknown, Entered By History     ondansetron (ZOFRAN ODT) 4 MG ODT tab Take 1 tablet (4 mg) by mouth every 8 hours as needed for nausea  Yes Isi Nelson,   9/10/23   predniSONE (DELTASONE) 20 MG tablet Take 20 mg by mouth daily x5 days then taper dose down 9/6/2023 at am Yes Unknown, Entered By History Yes 9/8/23   predniSONE (DELTASONE) 5 MG tablet Take 1.5 tablets (7.5 mg) by mouth daily 9/3/2023 Yes Good Mooney MD Yes    propranolol (INDERAL) 10 MG tablet Take 1 tablet (10 mg) by mouth 2 times daily as needed (anxiety) prn Yes Good Mooney MD Yes             triamcinolone (KENALOG) 0.1 % external cream Apply topically to affected areas 1-2 times a week 9/4/2023 Yes Unknown, Entered By History     venlafaxine (EFFEXOR-ER) 150 MG 24 hr tablet Take 150 mg by mouth 2 times daily 9/6/2023 at pm Yes Unknown, Entered By History No    VITAMIN D, CHOLECALCIFEROL, PO Take 1,000 Units by mouth daily 9/6/2023 Yes Unknown, Entered By History     warfarin ANTICOAGULANT (COUMADIN) 5 MG tablet Take by mouth daily , 7.5mg on Wednesdays & 10mg on all other days of the week 9/6/2023 at pm Yes Unknown, Entered By History No

## 2023-09-07 NOTE — CONSULTS
[facility] Consultation by [facility]rology    Jim Flores MRN# [id number]   Age: 48 year old YOB: 1975     Date of Admission:  9/7/2023    Reason for consult: MINNA Rodrigues PA-C       Requesting PA/MD: Renal colic       Level of consult: Consult, follow and place orders             Impression and Plan:   Impression/Assessment:   Jim Flores is a 48 year old male with 5 mm left UPJ ureteral stone and bilateral nephrolithiasis with >10 stones in each kidney  Recurrent nephrolithiasis  Family history of nephrolithiasis  History of hyperparathyroidism status post parathyroidectomy      Plan:   -NPO.  -plan to OR today for cystoscopy, bilateral retrograde pyelogram, and bilateral ureteral stents.  Will need to return to the OR in several weeks for definitive stone procedure to likely include bilateral ureteroscopy with laser lithotripsy and basketing and bilateral ureteral stent exchange.  We briefly discussed ESWL but given his stone burden, would likely not recommend.  -We briefly discussed just going forward with stenting on the left given his UPJ stone.  We also discussed bilateral stenting with attempt to go back and remove as much stone bilaterally as possible.  He has elected for bilateral stenting.  -IVF fluids  -Flomax 0.4mg QHS - monitor for orthostatic hypotension.   -Strain urine.  -Pain and nausea medication per primary service.  -Possible side effects with an indwelling ureteral stent such as urgency and frequency of urination, dysuria, hematuria, symptoms of urine reflux, and some achiness in the side. Indwelling ureteral stents need to be exchanged every three months or removed by three months.    -Patient had significant gross hematuria with his bilateral ureteral stents before including clots and difficulty with urination.  May need to consider temporarily holding his blood thinner.  -Thank you for involving us in the care of this patient. We will continue to follow along.      Charissa Guerra PA-C  [facility]   [phone]               Chief Complaint:   Flank pain     History is obtained from the patient and EMR.         History of Present Illness:   This patient is a 48 year old male who presented urgency department overnight due to flank pain.  He had been having near constant right-sided flank pain over the last day.  He also noted some weeks ago that he was having some intermittent pain on the left side.  Patient does have a history of nephrolithiasis.  He has passed probably greater than 100 stones over the years.  He is also required bilateral ureteroscopy several times.  This has been done with Dr. Johnson at [place] in 2015.  He has also had evaluation and treatment at [facility].  He last saw them in 2021.  Kidney stone composition has been calcium oxalate and calcium phosphate.  He has never undergone ESWL.    Patient has had metabolic evaluation several times.  He also has undergone parathyroidectomy for elevated calcium.  He had his first kidney stone at age 24.  Family history of nephrolithiasis in his father and brothers, but he notes that they have not had it as bad as him.    Patient endorses hematuria.  He denies dysuria.  Denies history of urinary tract infections.  He did note nausea and dry heaving, but denied fevers, and chills.  Patient is having some occasional chest pain shortness of breath, but he is having pericarditis.  This is thought to be secondary to his lupus.  He is on chronic anticoagulation in the form of Coumadin due to anticoagulation syndrome.  He did note that with his last stent, he had quite a bit of hematuria while on the blood thinner including multiple passages of clots.    Urinalysis was not convincing for infection.  Creatinine 1.13 with EGFR 79.  WBC 9.4.  Patient does note with his stents that he has previously had reflux.  He last ate at 2300.           Past Medical History:     Past Medical History:   Diagnosis Date     Antiphospholipid antibody positive     Anxiety state, unspecified     Cardiomegaly 9/7/2023    Hypercalciuria     Nephrolithiasis     Pulmonary infarct (H) 9/1/2008    SLE with normal kidneys (H)              Past Surgical History:     Past Surgical History:   Procedure Laterality Date    BIOPSY  2014, 2021    Mole check - all good    COLONOSCOPY  11/19/2012    Procedure: COLONOSCOPY;  Colonoscopy;  Surgeon: Lupe Ratliff MD;  Location: [facility]    COLONOSCOPY Left 4/12/2022    Procedure: COLONOSCOPY, FLEXIBLE, WITH POLYP REMOVAL USING HOT SNARE;  Surgeon: Lupe Ratliff MD;  Location: [facility]    COMBINED CYSTOSCOPY, RETROGRADES, URETEROSCOPY, LASER HOLMIUM LITHOTRIPSY URETER(S), INSERT STENT Left 6/24/2015    Procedure: COMBINED CYSTOSCOPY, RETROGRADES, URETEROSCOPY, LASER HOLMIUM LITHOTRIPSY URETER(S), INSERT STENT;  Surgeon: Ramesh Johnson MD;  Location: [facility] OR    GENITOURINARY SURGERY  2015 2018    kidney stone lithotripsy    HEAD & NECK SURGERY  2018    parathyroidectomy    [facility] stone remov      at [place] March 12 2018    LASER HOLMIUM LITHOTRIPSY URETER(S), INSERT STENT, COMBINED Right 11/11/2015    Procedure: COMBINED CYSTOSCOPY, URETEROSCOPY, LASER HOLMIUM LITHOTRIPSY URETER(S), INSERT STENT;  Surgeon: Ramesh Johnson MD;  Location: [facility] OR    ZZHC REPAIR INCISIONAL HERNIA,REDUCIBLE  1991    Hernia Repair, Incisional, Unilateral             Social History:     Never smoker.  .          Family History:     Family History   Problem Relation Age of Onset    Cancer Mother         thyroid and uterine    Hyperlipidemia Mother     Other Cancer Mother         Ovarian    Thyroid Disease Mother     Diabetes Father     Anxiety Disorder Father     Prostate Cancer Maternal Grandfather     Arthritis Paternal Grandmother         Type not known    Cardiovascular Paternal Grandfather         CHF    Gastrointestinal Disease Daughter         Celiac disease    Colon Cancer No family hx of    Family  history of nephrolithiasis in his brothers and father.         Allergies:     Allergies   Allergen Reactions    Ampicillin Hives    Bactrim Hives    Cefuroxime      Headache, stiff neck and joint pain    Cipro [Ciprofloxacin]      Stiff neck, joint pain, muscle aches    Ketorolac Tromethamine Itching    [place] Flavor Unknown     rodolfo fruit gives lip swelling, hives    Penicillins Hives and Unknown    Poison Ivy Extract Unknown    Sulfamethoxazole-Trimethoprim Unknown             Medications:     Current Facility-Administered Medications   Medication    acetaminophen (TYLENOL) tablet 650 mg    HYDROmorphone (DILAUDID) injection 0.2 mg    HYDROmorphone (DILAUDID) injection 0.4 mg    HYDROmorphone (PF) (DILAUDID) injection 0.5 mg    lidocaine (LMX4) cream    lidocaine 1 % 0.1-1 mL    ondansetron (ZOFRAN ODT) ODT tab 4 mg    Or    ondansetron (ZOFRAN) injection 4 mg    oxyCODONE (ROXICODONE) tablet 5 mg    oxyCODONE IR (ROXICODONE) half-tab 2.5 mg    sodium chloride (PF) 0.9% PF flush 3 mL    sodium chloride (PF) 0.9% PF flush 3 mL    sodium chloride 0.9% infusion    tamsulosin (FLOMAX) capsule 0.4 mg    Warfarin Dose Required Daily - Pharmacist Managed     Current Outpatient Medications   Medication Sig    ondansetron (ZOFRAN ODT) 4 MG ODT tab Take 1 tablet (4 mg) by mouth every 8 hours as needed for nausea    tamsulosin (FLOMAX) 0.4 MG capsule Take 1 capsule (0.4 mg) by mouth daily for 10 doses    venlafaxine (EFFEXOR XR) 150 MG 24 hr capsule TAKE TWO CAPSULES BY MOUTH ONCE DAILY    atorvastatin (LIPITOR) 10 MG tablet Take 1 tablet (10 mg) by mouth daily    fluticasone (FLONASE) 50 MCG/ACT nasal spray Spray 1-2 sprays into both nostrils daily    hydrochlorothiazide (HYDRODIURIL) 12.5 MG tablet Take 12.5 mg by mouth daily    hydroxychloroquine (PLAQUENIL) 200 MG tablet Take 200 mg by mouth 2 times daily.    LORazepam (ATIVAN) 0.5 MG tablet Take 1 tablet (0.5 mg) by mouth every 6 hours as needed for anxiety     NONFORMULARY Take 2 capsules by mouth every morning Methyl Guard Plus    Omega-3 Fatty Acids (FISH OIL) 1200 MG CPDR Take 2,400 Units by mouth daily    predniSONE (DELTASONE) 5 MG tablet Take 1.5 tablets (7.5 mg) by mouth daily    propranolol (INDERAL) 10 MG tablet Take 1 tablet (10 mg) by mouth 2 times daily as needed (anxiety)    tamsulosin (FLOMAX) 0.4 MG capsule Take 1 capsule (0.4 mg) by mouth daily as needed (kidney stone symptoms)    VITAMIN D, CHOLECALCIFEROL, PO Take 2,000 Units by mouth 2 times daily    warfarin (COUMADIN) 10 MG tablet TK 1.5 TS D OR AS DIRECTED BASED ON LAB RESULTS             Review of Systems:   A comprehensive 10-point review of systems was performed and found to be negative except as described in the HPI.     BP (!) 142/95 (BP Location: Left arm)   Pulse 97   Temp 98  F (36.7  C) (Oral)   Resp 16   SpO2 93%   PSYCH: NAD  EYES: EOMI  MOUTH: MMM  NECK: Supple, no notable adenopathy  RESP: Unlabored breathing  CARDIAC: No LE edema, regular radial pulse  SKIN: Warm, no rashes  ABD: soft, Nontender  NEURO: AAO x3  URO: Urinating on own.          Data:     Lab Results   Component Value Date    WBC 9.4 09/07/2023    HGB 14.2 09/07/2023    HCT 42.4 09/07/2023    MCV 86 09/07/2023     09/07/2023     Lab Results   Component Value Date    CR 1.13 09/07/2023    CR 1.09 07/09/2023     Recent Labs   Lab 09/07/23  0546   COLOR Yellow   APPEARANCE Clear   URINEGLC Negative   URINEBILI Negative   URINEKETONE Negative   SG 1.017   URINEPH 6.0   PROTEIN 10*   NITRITE Negative   LEUKEST Negative   RBCU 68*   WBCU 3        IMAGING    CT Chest (PE) Abdomen Pelvis w Contrast    Addendum Date: 9/7/2023    ADDENDUM: There is a dictation error in the kidneys/bladder resection of the report which should read: KIDNEYS/BLADDER: There is a 6 mm stone at the right ureteropelvic junction with mild right hydronephrosis. Edema in the right perinephric space related to forniceal rupture. Multiple additional  nonobstructing calculi are present in both kidneys, largest in the left kidney measuring 5 mm. Mild urothelial thickening and enhancement seen in the left renal pelvis and in the proximal left ureter. Urinary bladder has a smooth wall of uniform thickness. No bladder calculi. The original report IMPRESSION is unchanged. END OF ADDENDUM.    Result Date: 9/7/2023  EXAM: CT CHEST PE ABDOMEN PELVIS W CONTRAST LOCATION: [facility] DATE: 9/7/2023 INDICATION: chest pain and left flank pain COMPARISON: None. TECHNIQUE: CT chest pulmonary angiogram and routine CT abdomen pelvis with IV contrast. Arterial phase through the chest and venous phase through the abdomen and pelvis. Multiplanar reformats and MIP reconstructions were performed. Dose reduction techniques were used. CONTRAST: 60 mL Isovue 370 FINDINGS: ANGIOGRAM CHEST: Pulmonary arteries are normal caliber and negative for pulmonary emboli. Thoracic aorta is negative for dissection. No CT evidence of right heart strain. LUNGS AND PLEURA: Scattered mild atelectasis in the lung bases. No confluent consolidations. Trace left pleural effusion is present. No right pleural effusion. No pneumothorax. MEDIASTINUM/AXILLAE: No lymphadenopathy. Heart size is mildly enlarged with trace pericardial effusion. CORONARY ARTERY CALCIFICATION: Mild in the left coronary artery distribution.. HEPATOBILIARY: Normal. PANCREAS: Normal. SPLEEN: Normal. ADRENAL GLANDS: Normal. KIDNEYS/BLADDER: A 6 mm stone is seen in the right ureteropelvic junction with mild right hydronephrosis. [id number] stone seen in both kidneys, nonobstructing, largest in the left kidney measuring 5 mm. Mild urothelial thickening and enhancement seen in the left renal pelvis. Lesion in the BOWEL: Normal, including the appendix. LYMPH NODES: Normal. VASCULATURE: Minimal aortoiliac atherosclerotic calcifications. No abdominal aortic aneurysm. PELVIC ORGANS: Normal. MUSCULOSKELETAL: Mild anterior  wedge compression deformity of T8. No suspicious osseous lesions or acute fractures.     IMPRESSION: 1.  No acute findings in the chest including pulmonary embolism or pneumonia. 2.  Mild cardiomegaly with trace pericardial and trace left pleural effusions. 3.  6 mm stone in the right ureteropelvic junction with mild right hydronephrosis. 4.  Multiple bilateral nonobstructing renal stones measuring up to 5 mm on the left. 5.  Mild urothelial thickening and enhancement of the left renal pelvis and ureter. Recommend clinical correlation with urinalysis for upper urinary tract infection.    Discussed with Dr. Iliana Guerra PA-C   [facility]  [phone]

## 2023-09-07 NOTE — PLAN OF CARE
ROOM # 208-2    Living Situation (if not independent, order SW consult):  Facility name:  : [facility]    Activity level at baseline: IND  Activity level on admit: SBA    Who will be transporting you at discharge:     Patient registered to observation; given Patient Bill of Rights; given the opportunity to ask questions about observation status and their plan of care.  Patient has been oriented to the observation room, bathroom and call light is in place.    Discussed discharge goals and expectations with patient/family.         Goal Outcome Evaluation:

## 2023-09-07 NOTE — ANESTHESIA PREPROCEDURE EVALUATION
Anesthesia Pre-Procedure Evaluation    Patient: Jim Flores   MRN: [id number] : 1975        Procedure : Procedure(s):  cystoscopy, right retrograde pyelogram, right ureteral stent placement, possible left retrograde pyelogram, possible left ureteral stent placement          Past Medical History:   Diagnosis Date    Antiphospholipid antibody positive     Anxiety state, unspecified     Cardiomegaly 2023    Hypercalciuria     Nephrolithiasis     Pulmonary infarct (H) 2008    SLE with normal kidneys (H)       Past Surgical History:   Procedure Laterality Date    BIOPSY  ,     Mole check - all good    COLONOSCOPY  2012    Procedure: COLONOSCOPY;  Colonoscopy;  Surgeon: Lupe Ratliff MD;  Location: [facility]    COLONOSCOPY Left 2022    Procedure: COLONOSCOPY, FLEXIBLE, WITH POLYP REMOVAL USING HOT SNARE;  Surgeon: Lupe Ratliff MD;  Location: [facility]    COMBINED CYSTOSCOPY, RETROGRADES, URETEROSCOPY, LASER HOLMIUM LITHOTRIPSY URETER(S), INSERT STENT Left 2015    Procedure: COMBINED CYSTOSCOPY, RETROGRADES, URETEROSCOPY, LASER HOLMIUM LITHOTRIPSY URETER(S), INSERT STENT;  Surgeon: Ramesh Johnson MD;  Location: [facility]    GENITOURINARY SURGERY  2015    kidney stone lithotripsy    HEAD & NECK SURGERY  2018    parathyroidectomy    [facility] stone remov      at [place] 2018    LASER HOLMIUM LITHOTRIPSY URETER(S), INSERT STENT, COMBINED Right 2015    Procedure: COMBINED CYSTOSCOPY, URETEROSCOPY, LASER HOLMIUM LITHOTRIPSY URETER(S), INSERT STENT;  Surgeon: Ramesh Johnson MD;  Location[facility]    ZZHC REPAIR INCISIONAL HERNIA,REDUCIBLE  1991    Hernia Repair, Incisional, Unilateral      Allergies   Allergen Reactions    Ampicillin Hives    Bactrim Hives    Ceftazidime     Cefuroxime      Headache, stiff neck and joint pain    Cipro [Ciprofloxacin]      Stiff neck, joint pain, muscle aches    Ketorolac Tromethamine Itching    [place] Flavor Unknown     rodolfo  fruit gives lip swelling, hives    Penicillins Hives and Unknown    Poison Ivy Extract Unknown    Sulfamethoxazole-Trimethoprim Unknown    Tramadol Itching    Trimethoprim Hives      Social History     Tobacco Use    Smoking status: Never    Smokeless tobacco: Never   Substance Use Topics    Alcohol use: Yes     Alcohol/week: 0.0 standard drinks of alcohol     Comment: three per week      Wt Readings from Last 1 Encounters:   09/07/23 78.5 kg (173 lb)        Anesthesia Evaluation   Pt has had prior anesthetic. Type: General.    No history of anesthetic complications       ROS/MED HX  ENT/Pulmonary:  - neg pulmonary ROS     Neurologic:  - neg neurologic ROS     Cardiovascular:     (+)  hypertension- -   -  - -                                      METS/Exercise Tolerance:     Hematologic:  - neg hematologic  ROS     Musculoskeletal: Comment: SLE  C/W antiphospholipid antibody  (+)  arthritis,             GI/Hepatic:     (+) GERD, Asymptomatic on medication,                  Renal/Genitourinary: Comment: Lupus and renal lithiasis    (+) renal disease,      Nephrolithiasis ,       Endo:     (+)            Chronic steroid usage for Arthritis.         Psychiatric/Substance Use:  - neg psychiatric ROS   (+) psychiatric history depression       Infectious Disease:  - neg infectious disease ROS     Malignancy:  - neg malignancy ROS     Other:  - neg other ROS          Physical Exam    Airway        Mallampati: II   TM distance: > 3 FB   Neck ROM: full   Mouth opening: > 3 cm    Respiratory Devices and Support         Dental           Cardiovascular   cardiovascular exam normal       Rhythm and rate: regular and normal     Pulmonary   pulmonary exam normal        breath sounds clear to auscultation       Other findings: Lab Test        09/07/23 07/09/23 07/07/23 01/04/23 04/12/22 01/22/19 01/17/19                       0337          1050          1712          1321          0957          1042           2145          WBC          9.4          10.5         8.9            < >         --            < >        9.6           HGB          14.2         15.6         15.7           < >         --            < >        14.9          MCV          86           88           88             < >         --            < >        87            PLT          215          182          194            < >         --            < >        215           INR          1.09          --           --           --          1.08          --          1.32*          < > = values in this interval not displayed.                  Lab Test        09/07/23 07/09/23 07/07/23                       0337          1050          1712          NA           136          140          138           POTASSIUM    3.6          4.1          4.3           CHLORIDE     99           106          101           CO2          26           26           27            BUN          13.1         11.9         11.3          CR           1.13         1.09         1.06          ANIONGAP     11           8            10            SANDY          9.0          9.2          9.7           GLC          106*         107*         87                 Lab Test        09/07/23 07/09/23 07/07/23 01/04/23 04/12/22 01/22/19 01/17/19                       0337          1050          1712          1321          0957          1043          2145          WBC          9.4          10.5         8.9            < >         --            < >        9.6           HGB          14.2         15.6         15.7           < >         --            < >        14.9          MCV          86           88           88             < >         --            < >        87            PLT          215          182          194            < >         --            < >        215           INR          1.09          --           --           --          1.08          --          1.32*          <  > = values in this interval not displayed.                  Lab Test        09/07/23 07/09/23 07/07/23                       0337          1050          1712          NA           136          140          138           POTASSIUM    3.6          4.1          4.3           CHLORIDE     99           106          101           CO2          26           26           27            BUN          13.1         11.9         11.3          CR           1.13         1.09         1.06          ANIONGAP     11           8            10            SANDY          9.0          9.2          9.7           GLC          106*         107*         87                   EKG Interpretation:     Sinus rhythm  Normal ECG        OUTSIDE LABS:  CBC:   Lab Results   Component Value Date    WBC 9.4 09/07/2023    WBC 10.5 07/09/2023    HGB 14.2 09/07/2023    HGB 15.6 07/09/2023    HCT 42.4 09/07/2023    HCT 47.1 07/09/2023     09/07/2023     07/09/2023     BMP:   Lab Results   Component Value Date     09/07/2023     07/09/2023    POTASSIUM 3.6 09/07/2023    POTASSIUM 4.1 07/09/2023    CHLORIDE 99 09/07/2023    CHLORIDE 106 07/09/2023    CO2 26 09/07/2023    CO2 26 07/09/2023    BUN 13.1 09/07/2023    BUN 11.9 07/09/2023    CR 1.13 09/07/2023    CR 1.09 07/09/2023     (H) 09/07/2023     (H) 07/09/2023     COAGS:   Lab Results   Component Value Date    PTT 29 11/14/2015    INR 1.09 09/07/2023     POC:   Lab Results   Component Value Date     (H) 11/15/2015     HEPATIC:   Lab Results   Component Value Date    ALBUMIN 4.7 07/07/2023    PROTTOTAL 6.9 07/07/2023    ALT 45 07/07/2023    AST 39 07/07/2023    ALKPHOS 45 07/07/2023    BILITOTAL 0.4 07/07/2023     OTHER:   Lab Results   Component Value Date    LACT 1.2 09/07/2023    SANDY 9.0 09/07/2023    PHOS 2.7 11/15/2022    LIPASE 72 08/22/2008    TSH 1.14 09/24/2013    CRP 12.2 (H) 11/28/2015    SED 15 11/28/2015       Anesthesia Plan    ASA Status:  3     NPO Status:  NPO Appropriate    Anesthesia Type: General.     - Airway: LMA   Induction: Intravenous.   Maintenance: Balanced.        Consents    Anesthesia Plan(s) and associated risks, benefits, and realistic alternatives discussed. Questions answered and patient/representative(s) expressed understanding.     - Discussed: Risks, Benefits and Alternatives for BOTH SEDATION and the PROCEDURE were discussed     - Discussed with:  Patient      - Extended Intubation/Ventilatory Support Discussed: No.      - Patient is DNR/DNI Status: No     Use of blood products discussed: No .     Postoperative Care    Pain management: IV analgesics, Oral pain medications, Multi-modal analgesia.   PONV prophylaxis: Ondansetron (or other 5HT-3), Dexamethasone or Solumedrol     Comments:                Kennedy Adame, DO

## 2023-09-08 ENCOUNTER — PREP FOR PROCEDURE (OUTPATIENT)
Dept: UROLOGY | Facility: CLINIC | Age: 48
End: 2023-09-08
Payer: COMMERCIAL

## 2023-09-08 VITALS
SYSTOLIC BLOOD PRESSURE: 133 MMHG | RESPIRATION RATE: 18 BRPM | HEIGHT: 69 IN | OXYGEN SATURATION: 97 % | DIASTOLIC BLOOD PRESSURE: 93 MMHG | HEART RATE: 90 BPM | BODY MASS INDEX: 25.62 KG/M2 | TEMPERATURE: 98.4 F | WEIGHT: 173 LBS

## 2023-09-08 DIAGNOSIS — N20.0 BILATERAL NEPHROLITHIASIS: Primary | ICD-10-CM

## 2023-09-08 LAB
BACTERIA UR CULT: NO GROWTH
INR PPP: 1.35 (ref 0.85–1.15)

## 2023-09-08 PROCEDURE — G0378 HOSPITAL OBSERVATION PER HR: HCPCS

## 2023-09-08 PROCEDURE — 96361 HYDRATE IV INFUSION ADD-ON: CPT

## 2023-09-08 PROCEDURE — 250N000013 HC RX MED GY IP 250 OP 250 PS 637: Performed by: UROLOGY

## 2023-09-08 PROCEDURE — 99232 SBSQ HOSP IP/OBS MODERATE 35: CPT | Performed by: PHYSICIAN ASSISTANT

## 2023-09-08 PROCEDURE — 36415 COLL VENOUS BLD VENIPUNCTURE: CPT | Performed by: PHYSICIAN ASSISTANT

## 2023-09-08 PROCEDURE — 96376 TX/PRO/DX INJ SAME DRUG ADON: CPT

## 2023-09-08 PROCEDURE — 250N000013 HC RX MED GY IP 250 OP 250 PS 637: Performed by: PHYSICIAN ASSISTANT

## 2023-09-08 PROCEDURE — 99212 OFFICE O/P EST SF 10 MIN: CPT | Performed by: PHYSICIAN ASSISTANT

## 2023-09-08 PROCEDURE — 250N000012 HC RX MED GY IP 250 OP 636 PS 637: Performed by: UROLOGY

## 2023-09-08 PROCEDURE — 36415 COLL VENOUS BLD VENIPUNCTURE: CPT | Performed by: UROLOGY

## 2023-09-08 PROCEDURE — 85260 CLOT FACTOR X STUART-POWER: CPT | Performed by: PHYSICIAN ASSISTANT

## 2023-09-08 PROCEDURE — 250N000011 HC RX IP 250 OP 636: Performed by: EMERGENCY MEDICINE

## 2023-09-08 PROCEDURE — 258N000003 HC RX IP 258 OP 636: Performed by: PHYSICIAN ASSISTANT

## 2023-09-08 PROCEDURE — 85610 PROTHROMBIN TIME: CPT | Performed by: UROLOGY

## 2023-09-08 RX ORDER — OXYBUTYNIN CHLORIDE 5 MG/1
5 TABLET ORAL 3 TIMES DAILY PRN
Status: DISCONTINUED | OUTPATIENT
Start: 2023-09-08 | End: 2023-09-08 | Stop reason: HOSPADM

## 2023-09-08 RX ORDER — OXYCODONE HYDROCHLORIDE 5 MG/1
5 TABLET ORAL EVERY 6 HOURS PRN
Qty: 12 TABLET | Refills: 0 | Status: ON HOLD | OUTPATIENT
Start: 2023-09-08 | End: 2023-09-25

## 2023-09-08 RX ORDER — OXYBUTYNIN CHLORIDE 5 MG/1
5 TABLET ORAL 3 TIMES DAILY PRN
Qty: 21 TABLET | Refills: 0 | Status: SHIPPED | OUTPATIENT
Start: 2023-09-08 | End: 2023-09-22

## 2023-09-08 RX ADMIN — OXYCODONE HYDROCHLORIDE 5 MG: 5 TABLET ORAL at 00:25

## 2023-09-08 RX ADMIN — PREDNISONE 20 MG: 20 TABLET ORAL at 08:06

## 2023-09-08 RX ADMIN — HYDROXYCHLOROQUINE SULFATE 200 MG: 200 TABLET ORAL at 08:06

## 2023-09-08 RX ADMIN — OXYCODONE HYDROCHLORIDE 5 MG: 5 TABLET ORAL at 13:57

## 2023-09-08 RX ADMIN — OXYCODONE HYDROCHLORIDE 5 MG: 5 TABLET ORAL at 08:06

## 2023-09-08 RX ADMIN — VENLAFAXINE HYDROCHLORIDE 150 MG: 150 CAPSULE, EXTENDED RELEASE ORAL at 08:05

## 2023-09-08 RX ADMIN — OXYBUTYNIN CHLORIDE 5 MG: 5 TABLET ORAL at 12:27

## 2023-09-08 RX ADMIN — SODIUM CHLORIDE: 9 INJECTION, SOLUTION INTRAVENOUS at 00:47

## 2023-09-08 RX ADMIN — HYDROMORPHONE HYDROCHLORIDE 0.5 MG: 1 INJECTION, SOLUTION INTRAMUSCULAR; INTRAVENOUS; SUBCUTANEOUS at 04:13

## 2023-09-08 RX ADMIN — ACETAMINOPHEN 650 MG: 325 TABLET, FILM COATED ORAL at 13:57

## 2023-09-08 ASSESSMENT — ACTIVITIES OF DAILY LIVING (ADL)
ADLS_ACUITY_SCORE: 33

## 2023-09-08 NOTE — ANESTHESIA POSTPROCEDURE EVALUATION
Patient: Jim Flores    Procedure: Procedure(s):  cystoscopy, right retrograde pyelogram, right ureteral stent placement, possible left retrograde pyelogram, possible left ureteral stent placement       Anesthesia Type:  General    Note:  Disposition: Outpatient   Postop Pain Control: Uneventful            Sign Out: Well controlled pain   PONV: No   Neuro/Psych: Uneventful            Sign Out: Acceptable/Baseline neuro status   Airway/Respiratory: Uneventful            Sign Out: Acceptable/Baseline resp. status   CV/Hemodynamics: Uneventful            Sign Out: Acceptable CV status; No obvious hypovolemia; No obvious fluid overload   Other NRE: NONE   DID A NON-ROUTINE EVENT OCCUR? No           Last vitals:  Vitals Value Taken Time   /97 09/07/23 2200   Temp     Pulse 98 09/07/23 2202   Resp 18 09/07/23 2202   SpO2 100 % 09/07/23 2202   Vitals shown include unvalidated device data.    Electronically Signed By: Ramesh Bhatti MD  September 7, 2023  10:03 PM

## 2023-09-08 NOTE — PROGRESS NOTES
Patient's After Visit Summary was reviewed with patient   Patient verbalized understanding of After Visit Summary, recommended follow up and was given an opportunity to ask questions.   Discharge medications sent home with patient/family: YES  Discharged with spouse    Pt discharge with spouse via personal vehicle to home/self care. Pt has all personal belongings, discharge medication, and discharge narcotic script. Pt given instructions on picking up home medications left with IP pharmacy.     [facility] OBS OUT @:   1600

## 2023-09-08 NOTE — PHARMACY-ANTICOAGULATION SERVICE
Clinical Pharmacy- Warfarin Discharge Note  This patient is currently on warfarin for the treatment of  h/o PE, antiphospholipid syndrome .  INR Goal=  factor x chromogenic  20-40%  Expected length of therapy lifetime.    Warfarin PTA Regimen: 7.5 mg Wed, 10 mg rest of week.      Anticoagulation Dose History  More data exists         Latest Ref Rng & Units 9/7/2023 9/8/2023   Recent Dosing and Labs   warfarin (COUMADIN) tablet 8 mg - - -   Chromogenic Factor 10 70 - 130 % - -   INR 0.85 - 1.15 1.09  1.35        Vitamin K doses administered during the last 7 days: ----  FFP administered during the last 7 days: ---    Warfarin held this admission for stent placement.  Reviewed PTA, inpt and discharge meds.  Agree w/MD plan to hold warfarin based on hematuria.  Resume 9/10 if urine color improved, labs Monday.

## 2023-09-08 NOTE — ANESTHESIA POSTPROCEDURE EVALUATION
Patient: Jim Flores    Procedure: Procedure(s):  cystoscopy, right retrograde pyelogram, right ureteral stent placement, possible left retrograde pyelogram, possible left ureteral stent placement       Anesthesia Type:  General    Note:  Disposition: Outpatient   Postop Pain Control: Uneventful            Sign Out: Well controlled pain   PONV: No   Neuro/Psych: Uneventful            Sign Out: Acceptable/Baseline neuro status   Airway/Respiratory: Uneventful            Sign Out: Acceptable/Baseline resp. status   CV/Hemodynamics: Uneventful            Sign Out: Acceptable CV status; No obvious hypovolemia; No obvious fluid overload   Other NRE: NONE   DID A NON-ROUTINE EVENT OCCUR? No           Last vitals:  Vitals Value Taken Time   /96 09/07/23 2215   Temp 97.8  F (36.6  C) 09/07/23 2200   Pulse 89 09/07/23 2218   Resp 10 09/07/23 2218   SpO2 98 % 09/07/23 2218   Vitals shown include unvalidated device data.    Electronically Signed By: Ramesh Bhatti MD  September 7, 2023  10:19 PM

## 2023-09-08 NOTE — OP NOTE
DATE OF SERVICE: 9/7/2023  PREOPERATIVE DIAGNOSIS: Bilateral  Ureteral Stone  POSTOPERATIVE DIAGNOSIS: Bilateral Ureteral Stone    PROCEDURES PERFORMED:   1. Cystourethroscopy with bilateral ureteral stent placement  2. Bilateral retrograde pyelography with interpretation of intraoperative fluoroscopic imaging    STAFF SURGEON: Demetris Castillo MD  ANESTHESIA: General    ESTIMATED BLOOD LOSS: 1 cc  DRAINS/TUBES: Bilateral 6 [place] x 26 cm double-J ureteral stent     COMPLICATIONS: None.   SPECIMEN: None  SIGNIFICANT FINDINGS: Bilateral stents placed without complication.    BRIEF OPERATIVE INDICATIONS: Jim Flores is a48 year old male who recently presented with right ureteral stone and multiple enlarging left renal stones. After a discussion of all risks, benefits, and alternatives, the patient elected to proceed with ureteral stent placement. The patient understands the need for more than one procedure to eliminate stone burden.      DESCRIPTION OF PROCEDURE:  After informed consent was verified, the patient was transported to the operating room, placed supine on the table. After adequate anesthesia was induced, he was placed in dorsal lithotomy and prepped and draped in the usual sterile fashion. A timeout was taken to confirm correct patient, procedure and laterality. Pre-operative IV antibiotics were administered.    A 22 Andorran rigid cystoscope was inserted per a well-lubricated urethra. The anterior urethra was unremarkable. The ureteral orifices were orthotopic bilaterally. The right ureteral orifice was identified and cannulated with a Sensor wire and 6-Fr open-ended catheter. The wire passed without resistance into the upper pole.  A retrograde pyelogram showed mild hydronephrosis, but no other filling defects.  A 6 Fr x 26 cm double-J stent was advanced over the Sensor wire, and a good proximal curl was seen in the renal pelvis and the distal curl was seen in the bladder.  The left ureteral orifice was then  cannulated with the sensor and retrograde pyelogram demonstrated no hydronephrosis or filling defects. Left ureteral stent, 6 [place]ian x 26 cm was placed. The bladder was drained.   The patient tolerated the procedure well.  No apparent complications. He was transported to the postanesthesia care unit in stable condition.      PLAN: Patient to be scheduled for outpatient stone management in the coming weeks.    Demetris Castillo MD  Urology  [facility]

## 2023-09-08 NOTE — ANESTHESIA CARE TRANSFER NOTE
Patient: Jim Flores    Procedure: Procedure(s):  cystoscopy, right retrograde pyelogram, right ureteral stent placement, possible left retrograde pyelogram, possible left ureteral stent placement       Diagnosis: Right ureteral stone [N20.1]  Bilateral nephrolithiasis [N20.0]  Diagnosis Additional Information: No value filed.    Anesthesia Type:   General     Note:    Oropharynx: oropharynx clear of all foreign objects and spontaneously breathing  Level of Consciousness: awake  Oxygen Supplementation: face mask  Level of Supplemental Oxygen (L/min / FiO2): 6  Independent Airway: airway patency satisfactory and stable  Dentition: dentition unchanged  Vital Signs Stable: post-procedure vital signs reviewed and stable  Report to RN Given: handoff report given  Patient transferred to: PACU    Handoff Report: Identifed the Patient, Identified the Reponsible Provider, Reviewed the pertinent medical history, Discussed the surgical course, Reviewed Intra-OP anesthesia mangement and issues during anesthesia, Set expectations for post-procedure period and Allowed opportunity for questions and acknowledgement of understanding      Vitals:  Vitals Value Taken Time   /97 09/07/23 2200   Temp     Pulse 99 09/07/23 2203   Resp 15 09/07/23 2203   SpO2 100 % 09/07/23 2203   Vitals shown include unvalidated device data.    Electronically Signed By: CANDELARIA VELA CRNA  September 7, 2023  10:04 PM

## 2023-09-08 NOTE — PROGRESS NOTES
"/BP (!) 141/90 (BP Location: Left arm, Patient Position: Supine, Cuff Size: Adult Regular)   Pulse 83   Temp 98.5  F (36.9  C) (Oral)   Resp 20   Ht 1.753 m (5' 9\")   Wt 78.5 kg (173 lb)   SpO2 96%   BMI 25.55 kg/m     Patient back from PICU, conscious, vitals shows above, peeing,still bloody color. NS running at 100 ml per her, procedure area looks great. Patient  eating snaps  and drinking. Pt is on capno, room air. Will continue observation.  "

## 2023-09-08 NOTE — PROGRESS NOTES
Goal Outcome Evaluation:       Plan of Care Reviewed With: patient     Overall Patient Progress: no changeOverall Patient Progress: no change        PRIMARY DIAGNOSIS: POD1 Bilateral ureteral stent placement     OUTPATIENT/OBSERVATION GOALS TO BE MET BEFORE DISCHARGE  1. Pain Status: Improved-controlled with oral pain medications.     2. Tolerating adequate PO diet: Yes     3. Surgical Intervention planned: POD 1     4. Cleared by consultants (if involved): Yes     5. Return to near baseline physical activity: Yes     Discharge Planner Nurse   Safe discharge environment identified: Yes  Barriers to discharge: Yes       Entered by: Mecca Cardenas RN 09/08/2023   Please review provider order for any additional goals.   Nurse to notify provider when observation goals have been met and patient is ready for discharge.     Pt A&O x4; vss on r/a. Pt tolerating a regular diet and oral meds; pain controlled w/ 5mg oxy at 0800. Pt ambulating independently w/o alarms, steady/ind at baseline. Educated alarms will need to be in place if additional pain medication needed. PIV SL. Adequate UOP cherry red in bedside urinal. Urology following for dispo planning.

## 2023-09-08 NOTE — CARE PLAN
PRIMARY DIAGNOSIS: DAY ONE POST OP ( Right ureteral stent placement)  OUTPATIENT/OBSERVATION GOALS TO BE MET BEFORE DISCHARGE:  ADLs back to baseline: Yes    Activity and level of assistance: Up with standby assistance.    Pain status: Improved-controlled with oral pain medications.    Return to near baseline physical activity: Yes     Discharge Planner Nurse   Safe discharge environment identified: Yes  Barriers to discharge: Yes       Entered by: Evaristo Wynn RN 09/08/2023 6:01 AM     Vitals are Temp: 98  F (36.7  C) Temp src: Oral BP: 122/84 Pulse: 83   Resp: 17 SpO2: 95 %.  Patient is Alert and Oriented x4. On  Assist of 1 with Gait Belt.  Pt diet not mentioned but PICU nurse said he has been eaten well,also  observed him eating snacks patient complain of 6/10 pain   Dilaudid given for pain.  Patient has Normal Saline 0.9% running at 100 mL per hour.peeing okay using urinal at the bed side, urine is still bloody,he was encourage to drink more fluid. Dilaudid 0.5 mg PRN given    Plan: Urology is following  Please review provider order for any additional goals.   Nurse to notify provider when observation goals have been met and patient is ready for discharge.

## 2023-09-08 NOTE — PLAN OF CARE
Goal Outcome Evaluation:      Plan of Care Reviewed With: patient    Overall Patient Progress: no changeOverall Patient Progress: no change         PRIMARY DIAGNOSIS: POD1 Bilateral ureteral stent placement    OUTPATIENT/OBSERVATION GOALS TO BE MET BEFORE DISCHARGE  1. Pain Status: Improved-controlled with oral pain medications.    2. Tolerating adequate PO diet: Yes    3. Surgical Intervention planned: POD 1    4. Cleared by consultants (if involved): Yes    5. Return to near baseline physical activity: Yes    Discharge Planner Nurse   Safe discharge environment identified: Yes  Barriers to discharge: Yes       Entered by: Mecca Cardenas RN 09/08/2023   Please review provider order for any additional goals.   Nurse to notify provider when observation goals have been met and patient is ready for discharge.    Pt A&O x4; vss on r/a. Pt tolerating a regular diet and oral meds; Rating pain 8/10 prior to intervention in LLQ, pain controlled with 5mg oral Oxy. Pt ambulating independently w/ alarms for safety. Capno removed, PIV SL. Adequate UOP cherry red in bedside urinal. Urology following for dispo planning.

## 2023-09-08 NOTE — PROGRESS NOTES
[facility] Progress Note          Assessment and Plan:     Assessment:    POD 1 Cystourethroscopy with bilateral ureteral stent placement, bilateral retrograde pyelography with interpretation of intraoperative fluoroscopic imaging     Bilateral nephrolithiasis    Cardiomegaly    Pericardial effusion    Right ureteral stone    Lupus    History of hyperparathyroidism      Plan:   -We will plan on discontinuing Flomax 0.4 mg once daily.  -Started oxybutynin 5 mg 3 times daily as needed for pelvic discomfort from stents.  -Pain and nausea management per primary service.  -Possible side effects with an indwelling ureteral stent such as urgency and frequency of urination, dysuria, hematuria, symptoms of urine reflux, and some achiness in the side. Indwelling ureteral stents need to be exchanged every three months or removed by three months.    -Will need follow-up bilateral ureteroscopy with laser lithotripsy and basketing.  He had stents placed by Dr. Castillo, but we will plan on having follow-up procedure with [facility] provider.  Our office will coordinate.  -Okay to discharge from urological perspective later today if patient has adequate management of his discomfort.  Otherwise, anticipate likely discharge tomorrow morning.    Charissa Guerra PA-C   [facility]  [phone]               Interval History:     Doing okay.  Having abdominal discomfort, mainly on the left side.  Some reflux with urination.  Urine is maroon-colored.  INR 1.35.  Urine culture no growth.  Patient denies nausea, vomiting, fevers, chills, or shortness of breath.  He is afebrile without tachycardia.  Declined Flomax due to history of significant nasal congestion with this.             Review of Systems:     The 5 point Review of Systems is negative other than noted in the HPI             Medications:     Current Facility-Administered Medications Ordered in Epic   Medication Dose Route Frequency Last Rate Last  Admin    acetaminophen (TYLENOL) tablet 650 mg  650 mg Oral Q6H PRN   650 mg at 09/08/23 1357    HYDROmorphone (DILAUDID) injection 0.2 mg  0.2 mg Intravenous Q2H PRN        HYDROmorphone (DILAUDID) injection 0.4 mg  0.4 mg Intravenous Q2H PRN        HYDROmorphone (PF) (DILAUDID) injection 0.5 mg  0.5 mg Intravenous Q30 Min PRN   0.5 mg at 09/08/23 0413    hydroxychloroquine (PLAQUENIL) tablet 200 mg  200 mg Oral BID   200 mg at 09/08/23 0806    lidocaine (LMX4) cream   Topical Q1H PRN        lidocaine 1 % 0.1-1 mL  0.1-1 mL Other Q1H PRN        naloxone (NARCAN) injection 0.2 mg  0.2 mg Intravenous Q2 Min PRN        Or    naloxone (NARCAN) injection 0.4 mg  0.4 mg Intravenous Q2 Min PRN        Or    naloxone (NARCAN) injection 0.2 mg  0.2 mg Intramuscular Q2 Min PRN        Or    naloxone (NARCAN) injection 0.4 mg  0.4 mg Intramuscular Q2 Min PRN        ondansetron (ZOFRAN ODT) ODT tab 4 mg  4 mg Oral Q6H PRN        Or    ondansetron (ZOFRAN) injection 4 mg  4 mg Intravenous Q6H PRN        oxyBUTYnin (DITROPAN) tablet 5 mg  5 mg Oral TID PRN   5 mg at 09/08/23 1227    oxyCODONE (ROXICODONE) tablet 5 mg  5 mg Oral Q4H PRN   5 mg at 09/08/23 1357    oxyCODONE IR (ROXICODONE) half-tab 2.5 mg  2.5 mg Oral Q4H PRN        predniSONE (DELTASONE) tablet 20 mg  20 mg Oral Daily   20 mg at 09/08/23 0806    sodium chloride (PF) 0.9% PF flush 3 mL  3 mL Intracatheter q1 min prn        sodium chloride (PF) 0.9% PF flush 3 mL  3 mL Intracatheter Q8H   3 mL at 09/08/23 0027    venlafaxine (EFFEXOR XR) 24 hr capsule 150 mg  150 mg Oral BID   150 mg at 09/08/23 0805    Warfarin Dose Required Daily - Pharmacist Managed  1 each Oral See Admin Instructions        warfarin-No DOSE today  1 each Does not apply no dose today (warfarin)         Current Outpatient Medications Ordered in Epic   Medication    ondansetron (ZOFRAN ODT) 4 MG ODT tab    tamsulosin (FLOMAX) 0.4 MG capsule                  Physical Exam:   Vitals were  reviewed  Patient Vitals for the past 8 hrs:   BP Temp Temp src Pulse Resp SpO2   09/08/23 1244 (!) 133/93 98.4  F (36.9  C) Oral 90 18 97 %   09/08/23 0743 (!) 140/94 98.1  F (36.7  C) Oral 80 18 94 %     GEN: NAD, lying in bed  EYES: EOMI  MOUTH: MMM  NECK: Supple  RESP: Unlabored breathing  SKIN: Warm  ABD: soft, slightly tender left flank  NEURO: AAO  URO: Urinating on own with maroon hematuria           Data:   No results found for: NTBNPI, NTBNP  Lab Results   Component Value Date    WBC 9.4 09/07/2023    WBC 10.5 07/09/2023    WBC 8.9 07/07/2023    HGB 14.2 09/07/2023    HGB 15.6 07/09/2023    HGB 15.7 07/07/2023    HCT 42.4 09/07/2023    HCT 47.1 07/09/2023    HCT 46.4 07/07/2023    MCV 86 09/07/2023    MCV 88 07/09/2023    MCV 88 07/07/2023     09/07/2023     07/09/2023     07/07/2023     Lab Results   Component Value Date    INR 1.35 (H) 09/08/2023    INR 1.09 09/07/2023    INR 1.08 04/12/2022      All cultures:  Recent Labs   Lab 09/07/23  0546   CULTURE No Growth

## 2023-09-08 NOTE — DISCHARGE SUMMARY
[facility]    Discharge Summary  Hospitalist    Date of Admission:  9/7/2023  Date of Discharge:  9/8/2023  Provider:  Christi Sanchez PA-C  Date of Service (when I last saw the patient): 09/08/23    Discharge Diagnoses   Acute renal colic   Current SLE flare     Other medical issues:  Past Medical History:   Diagnosis Date    Antiphospholipid antibody positive     Anxiety state, unspecified     Cardiomegaly 9/7/2023    Hypercalciuria     Nephrolithiasis     Pulmonary infarct (H) 9/1/2008    SLE with normal kidneys (H)      History of Present Illness   Jim Flores is an 48 year old male with a history of HLD, Anxiety, GERD, Antiphospholipid Antibody, Anticardiolipin Antibody, PE, SLE, Pericarditis, Sjogren's Syndrome, Nephrolithiasis who presented to the ED today with right sided flank pain.     Patient reports he developed right sided flank pain with radiatio to the right groin yesterday.  He denies any dysuria or fevers.  He has noticed pink tinged urine.  He has had kidney stones in the past and this felt similar.  Additionally, he is followed by Rheumatology and is currently on an increased dose of Prednisone and is on Colchicine for and SLE flare with associated pericarditis. Please see the admission history and physical for full details.    Hospital Course   Jim Flores was admitted on 9/7/2023.  The following problems were addressed during his hospitalization:    Acute Renal Colic - afebrile, normal wbc and creatinine.  UA does not appear c/w infection.  CT revealed a 6 mm stone in the right ureteropelvic junction with mild right hydronephrosis. Multiple bilateral nonobstructing renal stones measuring up to 5 mm on the left.  Mild urothelial thickening and enhancement of the left renal pelvis and ureter.  - treated supportively with IVF, antiemetics, analgesics prn  - stone not passed on won   - Flomax not taken due to history of side effect of nasal congestion with taking   - final urine  "culture negative   - Urology consulted and s/p cystourethroscopy with bilateral ureteral stent placement   - plan for outpatient Urology follow up for definitive stone management      SLE - diagnosed 2013.  Followed by Rheumatology on chronic Prednisone.  Pt reports a flare of symptoms recently - fatigue, pericarditis, joint pains.  His Rheumatologist started Colchicine and his Prednisone is increased to 20mg daily.  CT chest on 9/7 revealed trace pericardial effusion.   - continue pta meds and close follow up with Rheumatology is recommended     Hx Antiphospholipid Antibody  Hx of PE - CT chest negative for PE today. On warfarin PTA that is followed by Dr. Diaz of [facility] with chromogenic 10 factor.   - chromogenic factor pending at discharge, patient will review when available upon discharge    - warfarin held on 9/7, discussed with urology and patient, will plan to hold 9/8 and 9/9 pending hematuria (can resume sooner if urine appears cherry red or lighter)  - patient has held Warfarin for a few days without issues with recurrent venous thrombosis (last was PE in 2008)  -will plan to have chromogenic 10 factor checked on 9/11     HLD - resume Atorvastatin upon discharge     Anxiety - continue Effexor.  Resume prn Propranolol and Ativan upon discharge    Pending Results   None    Code Status   Full Code       Primary Care Physician   Good Mooney    Exam:    BP (!) 133/93 (BP Location: Left arm)   Pulse 90   Temp 98.4  F (36.9  C) (Oral)   Resp 18   Ht 1.753 m (5' 9\")   Wt 78.5 kg (173 lb)   SpO2 97%   BMI 25.55 kg/m    General: A&Ox3, sitting up in bed, NAD  HEENT: NC/AT, no scleral icterus  CV: RRR without murmur  Lungs: CTAB without wheezing  GI: soft, mild left sided tenderness into flank with palpation, nondistended, normoactive bowel sounds  Ext: moves all extremities equally  Skin: warm, dry, no lesions in visualized areas     Discharge Disposition   Discharged to home    Consultations This Hospital " Stay   UROLOGY IP CONSULT  PHARMACY TO DOSE WARFARIN    Time Spent on this Encounter   I, Leah Sanchez PA-C, personally saw the patient today and spent greater than 30 minutes discharging this patient.    Discharge Orders      Reason for your hospital stay    You were admitted due to concern for right sided flank pain. Your workup included basic labs and imaging revealing a 6 mm stone on the right with mild inflammation and multiple bilateral nonobstructing stones measuring up to 5 mm. You were treated supportively with IV fluids, antinausea and pain medications. You were seen by Urology and underwent bilateral stent placement with plans for outpatient stone treatment in the near future. Of note your CT performed on 9/7 showed a small amount of fluid surrounding your heart and mild enlargement which would correlate with your recent lupus flare. If worsening chest pain or shortness of breath please seek evaluation to be reassessed. Your CT of your chest did not show a blood clot in your lungs. Your warfarin was held due to bloody urine from stenting and can continue to hold for now to allow this to improve.     Follow-up and recommended labs and tests     Follow up with hematology the beginning of next week to have chromogenic 10 level checked with recently holding warfarin, ideally check on 9/11.  Follow up with Urology for definitive stone management.     Activity    Your activity upon discharge: activity as tolerated     Discharge Instructions    Okay to hold Warfarin on 9/8 and 9/9 if ongoing hematuria, would start by 9/10 or sooner if urine becomes cherry red or lighter in color.     Diet    Follow this diet upon discharge: Orders Placed This Encounter      Regular Diet Adult     Discharge Medications   Current Discharge Medication List        START taking these medications    Details   oxyBUTYnin (DITROPAN) 5 MG tablet Take 1 tablet (5 mg) by mouth 3 times daily as needed for bladder spasms  Qty: 21  tablet, Refills: 0    Associated Diagnoses: Bilateral nephrolithiasis      oxyCODONE (ROXICODONE) 5 MG tablet Take 1 tablet (5 mg) by mouth every 6 hours as needed for severe pain (IF pain not managed with non-pharmacological and non-opioid interventions)  Qty: 12 tablet, Refills: 0    Associated Diagnoses: Bilateral nephrolithiasis           CONTINUE these medications which have NOT CHANGED    Details   atorvastatin (LIPITOR) 10 MG tablet Take 1 tablet (10 mg) by mouth daily  Qty: 90 tablet, Refills: 4    Associated Diagnoses: Hyperlipidemia LDL goal <130      fluticasone (FLONASE) 50 MCG/ACT nasal spray Spray 1-2 sprays into both nostrils daily as needed for rhinitis or allergies      hydroxychloroquine (PLAQUENIL) 200 MG tablet Take 200 mg by mouth 2 times daily.      ketoconazole (NIZORAL) 2 % external shampoo Use topically 1-2 times a week      LORazepam (ATIVAN) 0.5 MG tablet Take 1 tablet (0.5 mg) by mouth every 6 hours as needed for anxiety  Qty: 20 tablet, Refills: 0    Associated Diagnoses: Anxiety state      NONFORMULARY Take 1 capsule by mouth 2 times daily Methyl Guard Plus      Omega-3 Fatty Acids (FISH OIL) 1200 MG CPDR Take 1,200 Units by mouth 2 times daily      !! predniSONE (DELTASONE) 20 MG tablet Take 20 mg by mouth daily x5 days then taper dose down      !! predniSONE (DELTASONE) 5 MG tablet Take 1.5 tablets (7.5 mg) by mouth daily      propranolol (INDERAL) 10 MG tablet Take 1 tablet (10 mg) by mouth 2 times daily as needed (anxiety)  Qty: 30 tablet, Refills: 2    Associated Diagnoses: Anxiety state      triamcinolone (KENALOG) 0.1 % external cream Apply topically to affected areas 1-2 times a week      venlafaxine (EFFEXOR-ER) 150 MG 24 hr tablet Take 150 mg by mouth 2 times daily      VITAMIN D, CHOLECALCIFEROL, PO Take 1,000 Units by mouth daily      warfarin ANTICOAGULANT (COUMADIN) 5 MG tablet Take by mouth daily , 7.5mg on Wednesdays & 10mg on all other days of the week       !! -  Potential duplicate medications found. Please discuss with provider.        Allergies   Allergies   Allergen Reactions    Ampicillin Hives    Bactrim Hives    Ceftazidime     Cefuroxime      Headache, stiff neck and joint pain    Cipro [Ciprofloxacin]      Stiff neck, joint pain, muscle aches    Ketorolac Tromethamine Itching    [place] Flavor Unknown     rodolfo fruit gives lip swelling, hives    Penicillins Hives and Unknown    Poison Ivy Extract Unknown    Sulfamethoxazole-Trimethoprim Unknown    Tramadol Itching    Trimethoprim Hives     Data   Results for orders placed or performed during the hospital encounter of 09/07/23   CT Chest (PE) Abdomen Pelvis w Contrast     Status: None    Narrative    EXAM: CT CHEST PE ABDOMEN PELVIS W CONTRAST  LOCATION: [facility]  DATE: 9/7/2023    INDICATION: chest pain and left flank pain  COMPARISON: None.  TECHNIQUE: CT chest pulmonary angiogram and routine CT abdomen pelvis with IV contrast. Arterial phase through the chest and venous phase through the abdomen and pelvis. Multiplanar reformats and MIP reconstructions were performed. Dose reduction   techniques were used.   CONTRAST: 60 mL Isovue 370    FINDINGS:  ANGIOGRAM CHEST: Pulmonary arteries are normal caliber and negative for pulmonary emboli. Thoracic aorta is negative for dissection. No CT evidence of right heart strain.     LUNGS AND PLEURA: Scattered mild atelectasis in the lung bases. No confluent consolidations. Trace left pleural effusion is present. No right pleural effusion. No pneumothorax.    MEDIASTINUM/AXILLAE: No lymphadenopathy. Heart size is mildly enlarged with trace pericardial effusion.    CORONARY ARTERY CALCIFICATION: Mild in the left coronary artery distribution..    HEPATOBILIARY: Normal.    PANCREAS: Normal.    SPLEEN: Normal.    ADRENAL GLANDS: Normal.    KIDNEYS/BLADDER: A 6 mm stone is seen in the right ureteropelvic junction with mild right hydronephrosis. [id number]  stone seen in both kidneys, nonobstructing, largest in the left kidney measuring 5 mm. Mild urothelial thickening and enhancement seen   in the left renal pelvis. Lesion in the    BOWEL: Normal, including the appendix.    LYMPH NODES: Normal.    VASCULATURE: Minimal aortoiliac atherosclerotic calcifications. No abdominal aortic aneurysm.    PELVIC ORGANS: Normal.    MUSCULOSKELETAL: Mild anterior wedge compression deformity of T8. No suspicious osseous lesions or acute fractures.        Impression    IMPRESSION:    1.  No acute findings in the chest including pulmonary embolism or pneumonia.    2.  Mild cardiomegaly with trace pericardial and trace left pleural effusions.    3.  6 mm stone in the right ureteropelvic junction with mild right hydronephrosis.    4.  Multiple bilateral nonobstructing renal stones measuring up to 5 mm on the left.    5.  Mild urothelial thickening and enhancement of the left renal pelvis and ureter. Recommend clinical correlation with urinalysis for upper urinary tract infection.   XR Surgery HAZEL L/T 5 Min Fluoro w Stills     Status: None    Narrative    This exam was marked as non-reportable because it will not be read by a   radiologist or a [place] non-radiologist provider.         Basic metabolic panel     Status: Abnormal   Result Value Ref Range    Sodium 136 136 - 145 mmol/L    Potassium 3.6 3.4 - 5.3 mmol/L    Chloride 99 98 - 107 mmol/L    Carbon Dioxide (CO2) 26 22 - 29 mmol/L    Anion Gap 11 7 - 15 mmol/L    Urea Nitrogen 13.1 6.0 - 20.0 mg/dL    Creatinine 1.13 0.67 - 1.17 mg/dL    Calcium 9.0 8.6 - 10.0 mg/dL    Glucose 106 (H) 70 - 99 mg/dL    GFR Estimate 79 >60 mL/min/1.73m2   UA with Microscopic     Status: Abnormal   Result Value Ref Range    Color Urine Yellow Colorless, Straw, Light Yellow, Yellow    Appearance Urine Clear Clear    Glucose Urine Negative Negative mg/dL    Bilirubin Urine Negative Negative    Ketones Urine Negative Negative mg/dL    Specific Gravity  Urine 1.017 1.003 - 1.035    Blood Urine Moderate (A) Negative    pH Urine 6.0 5.0 - 7.0    Protein Albumin Urine 10 (A) Negative mg/dL    Urobilinogen Urine Normal Normal, 2.0 mg/dL    Nitrite Urine Negative Negative    Leukocyte Esterase Urine Negative Negative    Mucus Urine Present (A) None Seen /LPF    Sperm Urine Present (A) None Seen /HPF    RBC Urine 68 (H) <=2 /HPF    WBC Urine 3 <=5 /HPF   INR     Status: Normal   Result Value Ref Range    INR 1.09 0.85 - 1.15   Lactic acid whole blood     Status: Normal   Result Value Ref Range    Lactic Acid 1.2 0.7 - 2.0 mmol/L   Troponin T, High Sensitivity (now)     Status: Normal   Result Value Ref Range    Troponin T, High Sensitivity 7 <=22 ng/L   CBC with platelets and differential     Status: None   Result Value Ref Range    WBC Count 9.4 4.0 - 11.0 10e3/uL    RBC Count 4.96 4.40 - 5.90 10e6/uL    Hemoglobin 14.2 13.3 - 17.7 g/dL    Hematocrit 42.4 40.0 - 53.0 %    MCV 86 78 - 100 fL    MCH 28.6 26.5 - 33.0 pg    MCHC 33.5 31.5 - 36.5 g/dL    RDW 13.2 10.0 - 15.0 %    Platelet Count 215 150 - 450 10e3/uL    % Neutrophils 78 %    % Lymphocytes 11 %    % Monocytes 10 %    % Eosinophils 1 %    % Basophils 0 %    % Immature Granulocytes 0 %    NRBCs per 100 WBC 0 <1 /100    Absolute Neutrophils 7.3 1.6 - 8.3 10e3/uL    Absolute Lymphocytes 1.0 0.8 - 5.3 10e3/uL    Absolute Monocytes 0.9 0.0 - 1.3 10e3/uL    Absolute Eosinophils 0.1 0.0 - 0.7 10e3/uL    Absolute Basophils 0.0 0.0 - 0.2 10e3/uL    Absolute Immature Granulocytes 0.0 <=0.4 10e3/uL    Absolute NRBCs 0.0 10e3/uL   INR     Status: Abnormal   Result Value Ref Range    INR 1.35 (H) 0.85 - 1.15   EKG 12 lead     Status: None   Result Value Ref Range    Systolic Blood Pressure  mmHg    Diastolic Blood Pressure  mmHg    Ventricular Rate 91 BPM    Atrial Rate 91 BPM    AL Interval 170 ms    QRS Duration 102 ms     ms    QTc 467 ms    P Axis 15 degrees    R AXIS -1 degrees    T Axis 44 degrees     Interpretation ECG       Sinus rhythm  Normal ECG  When compared with ECG of 06-JUL-2015 14:17,  No significant change was found  Confirmed by - EMERGENCY ROOM, PHYSICIAN ([id number]),  RAF DAHL ([id number]) on 9/7/2023 6:55:53 AM     Urine Culture     Status: None    Specimen: Urine, Midstream   Result Value Ref Range    Culture No Growth    CBC with platelets differential     Status: None    Narrative    The following orders were created for panel order CBC with platelets differential.  Procedure                               Abnormality         Status                     ---------                               -----------         ------                     CBC with platelets and d...[[id number]]                      Final result                 Please view results for these tests on the individual orders.     Leah Sanchez PA-C

## 2023-09-09 LAB — FACT X ACT/NOR PPP CHRO: 59 % (ref 70–130)

## 2023-09-12 ENCOUNTER — TELEPHONE (OUTPATIENT)
Dept: UROLOGY | Facility: CLINIC | Age: 48
End: 2023-09-12
Payer: COMMERCIAL

## 2023-09-22 DIAGNOSIS — N20.0 BILATERAL NEPHROLITHIASIS: ICD-10-CM

## 2023-09-22 RX ORDER — OXYBUTYNIN CHLORIDE 5 MG/1
5 TABLET ORAL 3 TIMES DAILY PRN
Qty: 30 TABLET | Refills: 0 | Status: SHIPPED | OUTPATIENT
Start: 2023-09-22

## 2023-09-23 ENCOUNTER — ANESTHESIA EVENT (OUTPATIENT)
Dept: SURGERY | Facility: CLINIC | Age: 48
End: 2023-09-23
Payer: COMMERCIAL

## 2023-09-25 ENCOUNTER — ANESTHESIA (OUTPATIENT)
Dept: SURGERY | Facility: CLINIC | Age: 48
End: 2023-09-25
Payer: COMMERCIAL

## 2023-09-25 ENCOUNTER — APPOINTMENT (OUTPATIENT)
Dept: GENERAL RADIOLOGY | Facility: CLINIC | Age: 48
End: 2023-09-25
Attending: STUDENT IN AN ORGANIZED HEALTH CARE EDUCATION/TRAINING PROGRAM
Payer: COMMERCIAL

## 2023-09-25 ENCOUNTER — HOSPITAL ENCOUNTER (OUTPATIENT)
Facility: CLINIC | Age: 48
Discharge: HOME | End: 2023-09-25
Attending: STUDENT IN AN ORGANIZED HEALTH CARE EDUCATION/TRAINING PROGRAM | Admitting: STUDENT IN AN ORGANIZED HEALTH CARE EDUCATION/TRAINING PROGRAM
Payer: COMMERCIAL

## 2023-09-25 VITALS
DIASTOLIC BLOOD PRESSURE: 87 MMHG | OXYGEN SATURATION: 93 % | WEIGHT: 167 LBS | TEMPERATURE: 97.9 F | HEART RATE: 89 BPM | BODY MASS INDEX: 24.73 KG/M2 | SYSTOLIC BLOOD PRESSURE: 123 MMHG | HEIGHT: 69 IN | RESPIRATION RATE: 16 BRPM

## 2023-09-25 DIAGNOSIS — N20.0 BILATERAL NEPHROLITHIASIS: ICD-10-CM

## 2023-09-25 PROCEDURE — C1758 CATHETER, URETERAL: HCPCS | Performed by: STUDENT IN AN ORGANIZED HEALTH CARE EDUCATION/TRAINING PROGRAM

## 2023-09-25 PROCEDURE — 250N000009 HC RX 250: Performed by: NURSE ANESTHETIST, CERTIFIED REGISTERED

## 2023-09-25 PROCEDURE — 250N000009 HC RX 250: Performed by: STUDENT IN AN ORGANIZED HEALTH CARE EDUCATION/TRAINING PROGRAM

## 2023-09-25 PROCEDURE — 258N000001 HC RX 258: Performed by: STUDENT IN AN ORGANIZED HEALTH CARE EDUCATION/TRAINING PROGRAM

## 2023-09-25 PROCEDURE — 250N000011 HC RX IP 250 OP 636: Mod: JZ | Performed by: ANESTHESIOLOGY

## 2023-09-25 PROCEDURE — 250N000011 HC RX IP 250 OP 636: Performed by: NURSE ANESTHETIST, CERTIFIED REGISTERED

## 2023-09-25 PROCEDURE — 258N000003 HC RX IP 258 OP 636: Performed by: STUDENT IN AN ORGANIZED HEALTH CARE EDUCATION/TRAINING PROGRAM

## 2023-09-25 PROCEDURE — C2617 STENT, NON-COR, TEM W/O DEL: HCPCS | Performed by: STUDENT IN AN ORGANIZED HEALTH CARE EDUCATION/TRAINING PROGRAM

## 2023-09-25 PROCEDURE — 52356 CYSTO/URETERO W/LITHOTRIPSY: CPT | Mod: 50 | Performed by: STUDENT IN AN ORGANIZED HEALTH CARE EDUCATION/TRAINING PROGRAM

## 2023-09-25 PROCEDURE — 370N000017 HC ANESTHESIA TECHNICAL FEE, PER MIN: Performed by: STUDENT IN AN ORGANIZED HEALTH CARE EDUCATION/TRAINING PROGRAM

## 2023-09-25 PROCEDURE — C1894 INTRO/SHEATH, NON-LASER: HCPCS | Performed by: STUDENT IN AN ORGANIZED HEALTH CARE EDUCATION/TRAINING PROGRAM

## 2023-09-25 PROCEDURE — 255N000002 HC RX 255 OP 636: Mod: JZ | Performed by: STUDENT IN AN ORGANIZED HEALTH CARE EDUCATION/TRAINING PROGRAM

## 2023-09-25 PROCEDURE — 999N000141 HC STATISTIC PRE-PROCEDURE NURSING ASSESSMENT: Performed by: STUDENT IN AN ORGANIZED HEALTH CARE EDUCATION/TRAINING PROGRAM

## 2023-09-25 PROCEDURE — 250N000025 HC SEVOFLURANE, PER MIN: Performed by: STUDENT IN AN ORGANIZED HEALTH CARE EDUCATION/TRAINING PROGRAM

## 2023-09-25 PROCEDURE — 250N000013 HC RX MED GY IP 250 OP 250 PS 637: Performed by: ANESTHESIOLOGY

## 2023-09-25 PROCEDURE — 82365 CALCULUS SPECTROSCOPY: CPT | Performed by: STUDENT IN AN ORGANIZED HEALTH CARE EDUCATION/TRAINING PROGRAM

## 2023-09-25 PROCEDURE — 999N000179 XR SURGERY CARM FLUORO LESS THAN 5 MIN W STILLS: Mod: TC

## 2023-09-25 PROCEDURE — 272N000001 HC OR GENERAL SUPPLY STERILE: Performed by: STUDENT IN AN ORGANIZED HEALTH CARE EDUCATION/TRAINING PROGRAM

## 2023-09-25 PROCEDURE — 250N000013 HC RX MED GY IP 250 OP 250 PS 637: Performed by: STUDENT IN AN ORGANIZED HEALTH CARE EDUCATION/TRAINING PROGRAM

## 2023-09-25 PROCEDURE — 74420 UROGRAPHY RTRGR +-KUB: CPT | Mod: 26 | Performed by: STUDENT IN AN ORGANIZED HEALTH CARE EDUCATION/TRAINING PROGRAM

## 2023-09-25 PROCEDURE — 250N000011 HC RX IP 250 OP 636: Performed by: STUDENT IN AN ORGANIZED HEALTH CARE EDUCATION/TRAINING PROGRAM

## 2023-09-25 PROCEDURE — C1769 GUIDE WIRE: HCPCS | Performed by: STUDENT IN AN ORGANIZED HEALTH CARE EDUCATION/TRAINING PROGRAM

## 2023-09-25 PROCEDURE — 710N000012 HC RECOVERY PHASE 2, PER MINUTE: Performed by: STUDENT IN AN ORGANIZED HEALTH CARE EDUCATION/TRAINING PROGRAM

## 2023-09-25 PROCEDURE — 360N000077 HC SURGERY LEVEL 4, PER MIN: Performed by: STUDENT IN AN ORGANIZED HEALTH CARE EDUCATION/TRAINING PROGRAM

## 2023-09-25 PROCEDURE — 710N000009 HC RECOVERY PHASE 1, LEVEL 1, PER MIN: Performed by: STUDENT IN AN ORGANIZED HEALTH CARE EDUCATION/TRAINING PROGRAM

## 2023-09-25 PROCEDURE — 258N000003 HC RX IP 258 OP 636: Performed by: NURSE ANESTHETIST, CERTIFIED REGISTERED

## 2023-09-25 DEVICE — URETERAL STENT
Type: IMPLANTABLE DEVICE | Site: URETER | Status: NON-FUNCTIONAL
Brand: POLARIS™ ULTRA
Removed: 2023-10-10

## 2023-09-25 RX ORDER — DEXAMETHASONE SODIUM PHOSPHATE 4 MG/ML
INJECTION, SOLUTION INTRA-ARTICULAR; INTRALESIONAL; INTRAMUSCULAR; INTRAVENOUS; SOFT TISSUE PRN
Status: DISCONTINUED | OUTPATIENT
Start: 2023-09-25 | End: 2023-09-25

## 2023-09-25 RX ORDER — SODIUM CHLORIDE, SODIUM LACTATE, POTASSIUM CHLORIDE, CALCIUM CHLORIDE 600; 310; 30; 20 MG/100ML; MG/100ML; MG/100ML; MG/100ML
INJECTION, SOLUTION INTRAVENOUS CONTINUOUS
Status: DISCONTINUED | OUTPATIENT
Start: 2023-09-25 | End: 2023-09-25 | Stop reason: HOSPADM

## 2023-09-25 RX ORDER — SODIUM CHLORIDE, SODIUM LACTATE, POTASSIUM CHLORIDE, CALCIUM CHLORIDE 600; 310; 30; 20 MG/100ML; MG/100ML; MG/100ML; MG/100ML
INJECTION, SOLUTION INTRAVENOUS CONTINUOUS PRN
Status: DISCONTINUED | OUTPATIENT
Start: 2023-09-25 | End: 2023-09-25

## 2023-09-25 RX ORDER — OXYBUTYNIN CHLORIDE 10 MG/1
10 TABLET, EXTENDED RELEASE ORAL AT BEDTIME
Status: DISCONTINUED | OUTPATIENT
Start: 2023-09-25 | End: 2023-09-25

## 2023-09-25 RX ORDER — HYDROMORPHONE HCL IN WATER/PF 6 MG/30 ML
0.4 PATIENT CONTROLLED ANALGESIA SYRINGE INTRAVENOUS EVERY 5 MIN PRN
Status: DISCONTINUED | OUTPATIENT
Start: 2023-09-25 | End: 2023-09-25 | Stop reason: HOSPADM

## 2023-09-25 RX ORDER — LABETALOL HYDROCHLORIDE 5 MG/ML
10 INJECTION, SOLUTION INTRAVENOUS ONCE
Status: COMPLETED | OUTPATIENT
Start: 2023-09-25 | End: 2023-09-25

## 2023-09-25 RX ORDER — ONDANSETRON 4 MG/1
4 TABLET, ORALLY DISINTEGRATING ORAL EVERY 30 MIN PRN
Status: DISCONTINUED | OUTPATIENT
Start: 2023-09-25 | End: 2023-09-25 | Stop reason: HOSPADM

## 2023-09-25 RX ORDER — TAMSULOSIN HYDROCHLORIDE 0.4 MG/1
0.4 CAPSULE ORAL DAILY
Qty: 30 CAPSULE | Refills: 0 | Status: SHIPPED | OUTPATIENT
Start: 2023-09-25 | End: 2023-09-29

## 2023-09-25 RX ORDER — DOCUSATE SODIUM 100 MG/1
100 CAPSULE, LIQUID FILLED ORAL 2 TIMES DAILY
Qty: 30 CAPSULE | Refills: 0 | Status: SHIPPED | OUTPATIENT
Start: 2023-09-25 | End: 2023-09-29

## 2023-09-25 RX ORDER — ACETAMINOPHEN 500 MG
1000 TABLET ORAL ONCE
Status: COMPLETED | OUTPATIENT
Start: 2023-09-25 | End: 2023-09-25

## 2023-09-25 RX ORDER — OXYCODONE HYDROCHLORIDE 5 MG/1
5 TABLET ORAL EVERY 6 HOURS PRN
Qty: 10 TABLET | Refills: 0 | Status: ON HOLD | OUTPATIENT
Start: 2023-09-25 | End: 2023-10-10

## 2023-09-25 RX ORDER — FENTANYL CITRATE 50 UG/ML
INJECTION, SOLUTION INTRAMUSCULAR; INTRAVENOUS PRN
Status: DISCONTINUED | OUTPATIENT
Start: 2023-09-25 | End: 2023-09-25

## 2023-09-25 RX ORDER — HYDROMORPHONE HYDROCHLORIDE 1 MG/ML
INJECTION, SOLUTION INTRAMUSCULAR; INTRAVENOUS; SUBCUTANEOUS PRN
Status: DISCONTINUED | OUTPATIENT
Start: 2023-09-25 | End: 2023-09-25

## 2023-09-25 RX ORDER — HYDROMORPHONE HCL IN WATER/PF 6 MG/30 ML
0.2 PATIENT CONTROLLED ANALGESIA SYRINGE INTRAVENOUS EVERY 5 MIN PRN
Status: DISCONTINUED | OUTPATIENT
Start: 2023-09-25 | End: 2023-09-25 | Stop reason: HOSPADM

## 2023-09-25 RX ORDER — ONDANSETRON 2 MG/ML
INJECTION INTRAMUSCULAR; INTRAVENOUS PRN
Status: DISCONTINUED | OUTPATIENT
Start: 2023-09-25 | End: 2023-09-25

## 2023-09-25 RX ORDER — ACETAMINOPHEN 500 MG
1000 TABLET ORAL EVERY 6 HOURS PRN
Qty: 100 TABLET | Refills: 0 | Status: SHIPPED | OUTPATIENT
Start: 2023-09-25

## 2023-09-25 RX ORDER — MAGNESIUM HYDROXIDE 1200 MG/15ML
LIQUID ORAL PRN
Status: DISCONTINUED | OUTPATIENT
Start: 2023-09-25 | End: 2023-09-25 | Stop reason: HOSPADM

## 2023-09-25 RX ORDER — OXYBUTYNIN CHLORIDE 5 MG/1
5 TABLET ORAL ONCE
Status: COMPLETED | OUTPATIENT
Start: 2023-09-25 | End: 2023-09-25

## 2023-09-25 RX ORDER — IOPAMIDOL 612 MG/ML
INJECTION, SOLUTION INTRAVASCULAR PRN
Status: DISCONTINUED | OUTPATIENT
Start: 2023-09-25 | End: 2023-09-25 | Stop reason: HOSPADM

## 2023-09-25 RX ORDER — FENTANYL CITRATE 0.05 MG/ML
50 INJECTION, SOLUTION INTRAMUSCULAR; INTRAVENOUS EVERY 5 MIN PRN
Status: DISCONTINUED | OUTPATIENT
Start: 2023-09-25 | End: 2023-09-25 | Stop reason: HOSPADM

## 2023-09-25 RX ORDER — PROPOFOL 10 MG/ML
INJECTION, EMULSION INTRAVENOUS PRN
Status: DISCONTINUED | OUTPATIENT
Start: 2023-09-25 | End: 2023-09-25

## 2023-09-25 RX ORDER — OXYCODONE HYDROCHLORIDE 5 MG/1
5 TABLET ORAL ONCE
Status: COMPLETED | OUTPATIENT
Start: 2023-09-25 | End: 2023-09-25

## 2023-09-25 RX ORDER — ONDANSETRON 2 MG/ML
4 INJECTION INTRAMUSCULAR; INTRAVENOUS EVERY 30 MIN PRN
Status: DISCONTINUED | OUTPATIENT
Start: 2023-09-25 | End: 2023-09-25 | Stop reason: HOSPADM

## 2023-09-25 RX ORDER — HYDROXYZINE HYDROCHLORIDE 25 MG/1
25 TABLET, FILM COATED ORAL ONCE
Status: COMPLETED | OUTPATIENT
Start: 2023-09-25 | End: 2023-09-25

## 2023-09-25 RX ORDER — KETAMINE HYDROCHLORIDE 10 MG/ML
INJECTION INTRAMUSCULAR; INTRAVENOUS PRN
Status: DISCONTINUED | OUTPATIENT
Start: 2023-09-25 | End: 2023-09-25

## 2023-09-25 RX ORDER — PROPOFOL 10 MG/ML
INJECTION, EMULSION INTRAVENOUS CONTINUOUS PRN
Status: DISCONTINUED | OUTPATIENT
Start: 2023-09-25 | End: 2023-09-25

## 2023-09-25 RX ORDER — LIDOCAINE HYDROCHLORIDE 20 MG/ML
INJECTION, SOLUTION INFILTRATION; PERINEURAL PRN
Status: DISCONTINUED | OUTPATIENT
Start: 2023-09-25 | End: 2023-09-25

## 2023-09-25 RX ORDER — FENTANYL CITRATE 0.05 MG/ML
25 INJECTION, SOLUTION INTRAMUSCULAR; INTRAVENOUS EVERY 5 MIN PRN
Status: DISCONTINUED | OUTPATIENT
Start: 2023-09-25 | End: 2023-09-25 | Stop reason: HOSPADM

## 2023-09-25 RX ADMIN — OXYCODONE HYDROCHLORIDE 5 MG: 5 TABLET ORAL at 11:00

## 2023-09-25 RX ADMIN — ONDANSETRON 4 MG: 2 INJECTION INTRAMUSCULAR; INTRAVENOUS at 10:48

## 2023-09-25 RX ADMIN — Medication 10 MG: at 09:07

## 2023-09-25 RX ADMIN — LABETALOL HYDROCHLORIDE 10 MG: 5 INJECTION INTRAVENOUS at 10:13

## 2023-09-25 RX ADMIN — PROPOFOL 200 MG: 10 INJECTION, EMULSION INTRAVENOUS at 07:43

## 2023-09-25 RX ADMIN — SODIUM CHLORIDE, POTASSIUM CHLORIDE, SODIUM LACTATE AND CALCIUM CHLORIDE: 600; 310; 30; 20 INJECTION, SOLUTION INTRAVENOUS at 09:25

## 2023-09-25 RX ADMIN — ACETAMINOPHEN 1000 MG: 500 TABLET, FILM COATED ORAL at 06:49

## 2023-09-25 RX ADMIN — DEXAMETHASONE SODIUM PHOSPHATE 4 MG: 4 INJECTION, SOLUTION INTRA-ARTICULAR; INTRALESIONAL; INTRAMUSCULAR; INTRAVENOUS; SOFT TISSUE at 07:48

## 2023-09-25 RX ADMIN — HYDROMORPHONE HYDROCHLORIDE 0.4 MG: 0.2 INJECTION, SOLUTION INTRAMUSCULAR; INTRAVENOUS; SUBCUTANEOUS at 10:51

## 2023-09-25 RX ADMIN — HYDROMORPHONE HYDROCHLORIDE 0.5 MG: 1 INJECTION, SOLUTION INTRAMUSCULAR; INTRAVENOUS; SUBCUTANEOUS at 08:04

## 2023-09-25 RX ADMIN — FENTANYL CITRATE 50 MCG: 50 INJECTION, SOLUTION INTRAMUSCULAR; INTRAVENOUS at 10:10

## 2023-09-25 RX ADMIN — LIDOCAINE HYDROCHLORIDE 100 MG: 20 INJECTION, SOLUTION INFILTRATION; PERINEURAL at 07:43

## 2023-09-25 RX ADMIN — HYDROMORPHONE HYDROCHLORIDE 0.4 MG: 0.2 INJECTION, SOLUTION INTRAMUSCULAR; INTRAVENOUS; SUBCUTANEOUS at 10:20

## 2023-09-25 RX ADMIN — PROPOFOL 25 MCG/KG/MIN: 10 INJECTION, EMULSION INTRAVENOUS at 07:46

## 2023-09-25 RX ADMIN — SODIUM CHLORIDE, POTASSIUM CHLORIDE, SODIUM LACTATE AND CALCIUM CHLORIDE: 600; 310; 30; 20 INJECTION, SOLUTION INTRAVENOUS at 07:15

## 2023-09-25 RX ADMIN — Medication 10 MG: at 09:10

## 2023-09-25 RX ADMIN — HYDROMORPHONE HYDROCHLORIDE 0.4 MG: 0.2 INJECTION, SOLUTION INTRAMUSCULAR; INTRAVENOUS; SUBCUTANEOUS at 10:33

## 2023-09-25 RX ADMIN — FENTANYL CITRATE 100 MCG: 50 INJECTION, SOLUTION INTRAMUSCULAR; INTRAVENOUS at 07:43

## 2023-09-25 RX ADMIN — OXYBUTYNIN CHLORIDE 5 MG: 5 TABLET ORAL at 10:53

## 2023-09-25 RX ADMIN — HYDROXYZINE HYDROCHLORIDE 25 MG: 25 TABLET, FILM COATED ORAL at 06:49

## 2023-09-25 RX ADMIN — Medication 20 MG: at 08:07

## 2023-09-25 RX ADMIN — GENTAMICIN SULFATE 380 MG: 40 INJECTION, SOLUTION INTRAMUSCULAR; INTRAVENOUS at 06:41

## 2023-09-25 RX ADMIN — MIDAZOLAM 2 MG: 1 INJECTION INTRAMUSCULAR; INTRAVENOUS at 07:40

## 2023-09-25 RX ADMIN — ONDANSETRON 4 MG: 2 INJECTION INTRAMUSCULAR; INTRAVENOUS at 09:13

## 2023-09-25 RX ADMIN — Medication 10 MG: at 08:48

## 2023-09-25 RX ADMIN — FENTANYL CITRATE 50 MCG: 50 INJECTION, SOLUTION INTRAMUSCULAR; INTRAVENOUS at 09:57

## 2023-09-25 ASSESSMENT — LIFESTYLE VARIABLES: TOBACCO_USE: 0

## 2023-09-25 ASSESSMENT — ACTIVITIES OF DAILY LIVING (ADL)
ADLS_ACUITY_SCORE: 37

## 2023-09-25 ASSESSMENT — ENCOUNTER SYMPTOMS
SEIZURES: 0
DYSRHYTHMIAS: 0

## 2023-09-25 NOTE — ANESTHESIA CARE TRANSFER NOTE
Patient: Jim Flores    Procedure: Procedure(s):  Cystoscopy, bilateral ureteroscopy with holmium laser lithotripsy, bilateral ureteroscopy with stone basketing, bilateral retrograde pyelogram, bilateral ureteral stent exchange       Diagnosis: Bilateral nephrolithiasis [N20.0]  Diagnosis Additional Information: No value filed.    Anesthesia Type:   General     Note:    Oropharynx: oropharynx clear of all foreign objects and spontaneously breathing  Level of Consciousness: awake  Oxygen Supplementation: face mask  Level of Supplemental Oxygen (L/min / FiO2): 6  Independent Airway: airway patency satisfactory and stable  Dentition: dentition unchanged  Vital Signs Stable: post-procedure vital signs reviewed and stable  Report to RN Given: handoff report given  Patient transferred to: PACU    Handoff Report: Identifed the Patient, Identified the Reponsible Provider, Reviewed the pertinent medical history, Discussed the surgical course, Reviewed Intra-OP anesthesia mangement and issues during anesthesia, Set expectations for post-procedure period and Allowed opportunity for questions and acknowledgement of understanding      Vitals:  Vitals Value Taken Time   /107 09/25/23 0934   Temp     Pulse 98 09/25/23 0936   Resp 16 09/25/23 0936   SpO2 99 % 09/25/23 0936   Vitals shown include unvalidated device data.    Electronically Signed By: CANDELARIA Odell CRNA  September 25, 2023  9:38 AM

## 2023-09-25 NOTE — OP NOTE
[facility]  Operative Note    Pre-operative diagnosis: Bilateral nephrolithiasis [N20.0]   Post-operative diagnosis Bilateral nephrolithiasis   Procedure: Procedure(s):  Cystoscopy  Right ureteroscopy with holmium laser lithotripsy  Right ureteroscopy with stone basketing  Left ureteroscopy with holmium laser lithotripsy complex >2.5 cm stone burden  Left ureteroscopy with stone basketing complex >2.5 cm stone burden  bilateral retrograde pyelogram  bilateral ureteral stent exchange  Fluoroscopic interpretation <1 hour physician time   Surgeon: Sheng Barrientos MD   Assistants(s): none   Anesthesia: General    Estimated blood loss: Minimal    Total IV fluids: (See anesthesia record)   Blood transfusion: No transfusion was given during surgery   Total urine output: Not measured   Drains: Bilateral ureteral stents   Specimens: ID Type Source Tests Collected by Time Destination   A : RIGHT and LEFT kidney stones Calculus/Stone Kidney, Right STONE ANALYSIS Sheng Barrientos MD 9/25/2023  8:31 AM         Implants: Implant Name Type Inv. Item Serial No.  Lot No. LRB No. Used Action   STENT URETERAL POLARIS ULTRA [id number] - [id number] Stent STENT URETERAL POLARIS ULTRA [id number]  [employer] [id number] Right 1 Explanted   STENT URETERAL POLARIS ULTRA [id number] - SREF [id number] Stent STENT URETERAL POLARIS ULTRA [id number] REF [id number] [employer] [id number] Right 1 Implanted   STENT URETERAL POLARIS ULTRA [id number] - [id number] Stent STENT URETERAL POLARIS ULTRA [id number]  [employer] [id number] Left 1 Explanted   STENT URETERAL POLARIS ULTRA [id number] - [id number] Stent STENT URETERAL POLARIS ULTRA [id number]  [employer] [id number] Left 1 Implanted          Findings:   6 mm right lower pole stone and additional scattered 2-3 mm stones including embedded stones in  papillae, laser fragmented and basketed, 95% stone free.    Numerous left renal stones 4-10 mm, including large cluster in lower pole and upper pole calyces, total stone burden >2.5 cm, estimate 70% stone free, will need repeat ureteroscopy     Complications: None.   Condition: Stable               Description of procedure:  48-year-old man with history of nephrolithiasis in the past now with right proximal ureteral stone and multiple enlarging left renal stones status post cystoscopy and bilateral stent placement 9/7/2023 who returns today for ureteroscopic management of the stones.  Risks including incomplete stone clearance, need for secondary procedure, ureteral injury, infection, stent pain irritation, hematuria were discussed.  Informed consent was obtained.    The patient was brought to operating room #19.  Gentamicin antibiotics were given prior to the procedure.  General anesthesia was induced, he was placed in dorsolithotomy position, prepped and draped in standard sterile fashion.  A timeout was performed.    A 22 [place] Storz cystoscope was assembled and passed through the urethra into the bladder.  Bilateral ureteral stents were noted emanating from the respective orifices with no encrustation.  The right ureteral stent was grasped with a stent grasper and brought out the urethral meatus.  The stent was cannulated with a 0.035 inch stiff shaft Glidewire which was passed up to the renal pelvis under fluoroscopic guidance and the existing stent was removed intact and discarded.  The wire was clipped to the drapes as a safety wire.  A Storz long semirigid ureteroscope was assembled and passed through the urethra, into the bladder and up the right ureter.  The ureter was widely patent and I was able to pass the scope all the way up through the ureteropelvic junction into the renal pelvis.  A gentle retrograde pyelogram showed mild right hydronephrosis.  A second wire was then passed through the scope up to  the renal pelvis and the scope was backloaded off the wire.  Over the working wire a [employer] navigator 11/13 Mal[place] by 46 cm ureteral access sheath was passed up to the proximal ureter under fluoroscopic guidance and the obturator and wire were removed.  A [employer] reviews digital flexible ureteroscope was then passed up the access sheath.  Complete pyeloscopy revealed a single 6 mm right lower pole stone as well as additional scattered 2 to 3 mm stones.  The dominant stone was lasered fragmented using the luminous holmium laser and basketed out.  The smaller embedded stones were dusted using the laser.  Estimate 95% stone free.  Pullout ureteroscopy revealed the ureter to be in good condition with no tears or lacerations.  The wire was left in place while attention was turned to the left side.    Left stent removal, placement of a safety wire, semirigid ureteroscopy, retrograde pyelogram, passage of a working wire placement of an access sheath, flexible pyeloscopy and ureteroscopy with laser lithotripsy and stone basketing was performed in a similar fashion as to the right side.  Notable findings on the left side included mild hydronephrosis with a complex renal collecting system with long infundibula a and dilated renal calyces.  There was a large cluster of stones in the lower pole as well as in the upper pole.  There were additional scattered stones within the renal calyces.  There was well greater than 2.5 cm of total stone burden and this was a complex operation due to this.  Estimate 70% stone free.  He will need a second operation to finish clearance of the stones. Pullout ureteroscopy revealed the ureter to be in good condition with no tears or lacerations    A stent was then placed on the left side in the usual fashion under cystoscopic and fluoroscopic guidance.  A right-sided stent was then placed in a similar fashion.  There were good curls noted proximally and distally.  Bladder was drained and scope  was removed.  Patient was cleaned up, awoken from anesthesia and brought to PACU in stable condition.  He will discharge home and will need to be scheduled for repeat ureteroscopy in the next few weeks    Sheng Barrientos MD   [facility]  [phone] clinic phone

## 2023-09-25 NOTE — ANESTHESIA PREPROCEDURE EVALUATION
Anesthesia Pre-Procedure Evaluation    Patient: Jim Flores   MRN: [id number] : 1975        Procedure : Procedure(s):  Cystoscopy, bilateral ureteroscopy with holmium laser lithotripsy, bilateral ureteroscopy with stone basketing, bilateral retrograde pyelogram, bilateral ureteral stent exchange          Past Medical History:   Diagnosis Date    Antiphospholipid antibody positive     Anxiety state, unspecified     Cardiomegaly 2023    Hypercalciuria     Nephrolithiasis     Pulmonary infarct (H) 2008    SLE with normal kidneys (H)       Past Surgical History:   Procedure Laterality Date    BIOPSY  ,     Mole check - all good    COLONOSCOPY  2012    Procedure: COLONOSCOPY;  Colonoscopy;  Surgeon: Lupe Ratliff MD;  Location: [facility]    COLONOSCOPY Left 2022    Procedure: COLONOSCOPY, FLEXIBLE, WITH POLYP REMOVAL USING HOT SNARE;  Surgeon: Lupe Ratliff MD;  Location: [facility]    COMBINED CYSTOSCOPY, RETROGRADES, URETEROSCOPY, LASER HOLMIUM LITHOTRIPSY URETER(S), INSERT STENT Left 2015    Procedure: COMBINED CYSTOSCOPY, RETROGRADES, URETEROSCOPY, LASER HOLMIUM LITHOTRIPSY URETER(S), INSERT STENT;  Surgeon: Ramesh Johnson MD;  Location: [facility]    CYSTOSCOPY, RETROGRADES, INSERT STENT URETER(S), COMBINED Bilateral 2023    Procedure: 1. Cystourethroscopy with bilateral ureteral stent placement 2. Bilateral retrograde pyelography with interpretation of intraoperative fluoroscopic imaging;  Surgeon: Demetris Castillo MD;  Location: [facility]    GENITOURINARY SURGERY  2015    kidney stone lithotripsy    HEAD & NECK SURGERY  2018    parathyroidectomy    [facility] stone remov      at [place] 2018    LASER HOLMIUM LITHOTRIPSY URETER(S), INSERT STENT, COMBINED Right 2015    Procedure: COMBINED CYSTOSCOPY, URETEROSCOPY, LASER HOLMIUM LITHOTRIPSY URETER(S), INSERT STENT;  Surgeon: Ramesh Johnson MD;  Location: [facility]    ZZHC REPAIR INCISIONAL  HERNIA,REDUCIBLE  1991    Hernia Repair, Incisional, Unilateral      Allergies   Allergen Reactions    Ampicillin Hives    Bactrim Hives    Ceftazidime     Cefuroxime      Headache, stiff neck and joint pain    Cipro [Ciprofloxacin]      Stiff neck, joint pain, muscle aches    Ketorolac Tromethamine Itching    Acworth Flavor Unknown     rodolfo fruit gives lip swelling, hives    Penicillins Hives and Unknown    Poison Ivy Extract Unknown    Sulfamethoxazole-Trimethoprim Unknown    Tramadol Itching    Trimethoprim Hives      Social History     Tobacco Use    Smoking status: Never    Smokeless tobacco: Never   Substance Use Topics    Alcohol use: Yes     Alcohol/week: 0.0 standard drinks of alcohol     Comment: three per week      Wt Readings from Last 1 Encounters:   09/07/23 78.5 kg (173 lb)        Prior to Admission medications    Medication Sig Start Date End Date Taking? Authorizing Provider   atorvastatin (LIPITOR) 10 MG tablet Take 1 tablet (10 mg) by mouth daily 11/21/22   Good Monoey MD   fluticasone (FLONASE) 50 MCG/ACT nasal spray Spray 1-2 sprays into both nostrils daily as needed for rhinitis or allergies    Unknown, Entered By History   hydroxychloroquine (PLAQUENIL) 200 MG tablet Take 200 mg by mouth 2 times daily.    Reported, Patient   ketoconazole (NIZORAL) 2 % external shampoo Use topically 1-2 times a week    Unknown, Entered By History   LORazepam (ATIVAN) 0.5 MG tablet Take 1 tablet (0.5 mg) by mouth every 6 hours as needed for anxiety 11/21/22   Good Mooney MD   NONFORMULARY Take 1 capsule by mouth 2 times daily Methyl Guard Plus    Unknown, Entered By History   Omega-3 Fatty Acids (FISH OIL) 1200 MG CPDR Take 1,200 Units by mouth 2 times daily    Unknown, Entered By History   oxyBUTYnin (DITROPAN) 5 MG tablet Take 1 tablet (5 mg) by mouth 3 times daily as needed for bladder spasms 9/22/23   Sheng Barrientos MD   oxyCODONE (ROXICODONE) 5 MG tablet Take 1 tablet (5 mg) by mouth every 6 hours  as needed for severe pain (IF pain not managed with non-pharmacological and non-opioid interventions) 9/8/23   Leah Sanchez PA-C   predniSONE (DELTASONE) 20 MG tablet Take 20 mg by mouth daily x5 days then taper dose down 9/4/23 9/8/23  Unknown, Entered By History   predniSONE (DELTASONE) 5 MG tablet Take 1.5 tablets (7.5 mg) by mouth daily 1/22/18   Good Mooney MD   propranolol (INDERAL) 10 MG tablet Take 1 tablet (10 mg) by mouth 2 times daily as needed (anxiety) 11/21/22   Good Mooney MD   triamcinolone (KENALOG) 0.1 % external cream Apply topically to affected areas 1-2 times a week    Unknown, Entered By History   venlafaxine (EFFEXOR-ER) 150 MG 24 hr tablet Take 150 mg by mouth 2 times daily    Unknown, Entered By History   VITAMIN D, CHOLECALCIFEROL, PO Take 1,000 Units by mouth daily    Unknown, Entered By History   warfarin ANTICOAGULANT (COUMADIN) 5 MG tablet Take by mouth daily , 7.5mg on Wednesdays & 10mg on all other days of the week    Unknown, Entered By History     ECG 9/7/23: Sinus rhythm   Normal ECG      Anesthesia Evaluation   Pt has had prior anesthetic. Type: General.    No history of anesthetic complications       ROS/MED HX  ENT/Pulmonary:    (-) tobacco use, asthma and sleep apnea   Neurologic:    (-) no seizures, no CVA and migraines   Cardiovascular: Comment: Hx cardiomegally    (+)  hypertension- -   -  - -                                   (-) CAD, GRAMAJO, arrhythmias, valvular problems/murmurs and dyslipidemia   METS/Exercise Tolerance:     Hematologic:     (+) History of blood clots,            (-) anemia   Musculoskeletal:   (+)  arthritis,             GI/Hepatic:     (+) GERD, Asymptomatic on medication,               (-) liver disease   Renal/Genitourinary:     (+) renal disease,      Nephrolithiasis ,       Endo: Comment: Lupus, Antiphospholipid syndrome    (+)            Chronic steroid usage for Arthritis.      (-) Type I DM, Type II DM, thyroid disease and obesity    Psychiatric/Substance Use:     (+) psychiatric history anxiety       Infectious Disease:  - neg infectious disease ROS  (-) Recent Fever   Malignancy:  - neg malignancy ROS     Other:            Physical Exam    Airway        Mallampati: II   TM distance: > 3 FB   Neck ROM: full   Mouth opening: > 3 cm    Respiratory Devices and Support         Dental           Cardiovascular   cardiovascular exam normal       Rhythm and rate: regular and normal   (+) murmur       Pulmonary   pulmonary exam normal        breath sounds clear to auscultation           OUTSIDE LABS:  CBC:   Lab Results   Component Value Date    WBC 9.4 09/07/2023    WBC 10.5 07/09/2023    HGB 14.2 09/07/2023    HGB 15.6 07/09/2023    HCT 42.4 09/07/2023    HCT 47.1 07/09/2023     09/07/2023     07/09/2023     BMP:   Lab Results   Component Value Date     09/07/2023     07/09/2023    POTASSIUM 3.6 09/07/2023    POTASSIUM 4.1 07/09/2023    CHLORIDE 99 09/07/2023    CHLORIDE 106 07/09/2023    CO2 26 09/07/2023    CO2 26 07/09/2023    BUN 13.1 09/07/2023    BUN 11.9 07/09/2023    CR 1.13 09/07/2023    CR 1.09 07/09/2023     (H) 09/07/2023     (H) 07/09/2023     COAGS:   Lab Results   Component Value Date    PTT 29 11/14/2015    INR 1.35 (H) 09/08/2023     POC:   Lab Results   Component Value Date     (H) 11/15/2015     HEPATIC:   Lab Results   Component Value Date    ALBUMIN 4.7 07/07/2023    PROTTOTAL 6.9 07/07/2023    ALT 45 07/07/2023    AST 39 07/07/2023    ALKPHOS 45 07/07/2023    BILITOTAL 0.4 07/07/2023     OTHER:   Lab Results   Component Value Date    LACT 1.2 09/07/2023    SANDY 9.0 09/07/2023    PHOS 2.7 11/15/2022    LIPASE 72 08/22/2008    TSH 1.14 09/24/2013    CRP 12.2 (H) 11/28/2015    SED 15 11/28/2015       Anesthesia Plan    ASA Status:  3    NPO Status:  NPO Appropriate    Anesthesia Type: General.     - Airway: LMA   Induction: Propofol.   Maintenance: Balanced.         Consents    Anesthesia Plan(s) and associated risks, benefits, and realistic alternatives discussed. Questions answered and patient/representative(s) expressed understanding.     - Discussed:     - Discussed with:  Patient            Postoperative Care    Pain management: Multi-modal analgesia.   PONV prophylaxis: Dexamethasone or Solumedrol, Ondansetron (or other 5HT-3), Background Propofol Infusion     Comments:    Other Comments: Dilaudid, ketamine prior to emergence            Анна Flores MD

## 2023-09-25 NOTE — ANESTHESIA POSTPROCEDURE EVALUATION
Patient: Jim Flores    Procedure: Procedure(s):  Cystoscopy, bilateral ureteroscopy with holmium laser lithotripsy, bilateral ureteroscopy with stone basketing, bilateral retrograde pyelogram, bilateral ureteral stent exchange       Anesthesia Type:  General    Note:  Disposition: Admission   Postop Pain Control: Uneventful            Sign Out: Well controlled pain   PONV: No   Neuro/Psych: Uneventful            Sign Out: Acceptable/Baseline neuro status   Airway/Respiratory: Uneventful            Sign Out: Acceptable/Baseline resp. status   CV/Hemodynamics: Uneventful            Sign Out: Acceptable CV status; No obvious hypovolemia; No obvious fluid overload   Other NRE: NONE   DID A NON-ROUTINE EVENT OCCUR? No           Last vitals:  Vitals Value Taken Time   /87 09/25/23 1115   Temp 37.2  C (99  F) 09/25/23 1015   Pulse 90 09/25/23 1115   Resp 10 09/25/23 1115   SpO2 97 % 09/25/23 1115   Vitals shown include unvalidated device data.    Electronically Signed By: Анна Flores MD  September 25, 2023  12:37 PM

## 2023-09-25 NOTE — DISCHARGE INSTRUCTIONS
POSTOPERATIVE INSTRUCTIONS    Diagnosis-------------------------------   Bilateral nephrolithiasis    Procedure-------------------------------  Procedure(s) (LRB):  Cystoscopy, bilateral ureteroscopy with holmium laser lithotripsy, bilateral ureteroscopy with stone basketing, bilateral retrograde pyelogram, bilateral ureteral stent exchange (Bilateral)      Findings--------------------------------  6 mm right lower pole stone and additional scattered 2-3 mm stones including embedded stones in papillae, laser fragmented and basketed, 95% stone free.    Numerous left renal stones 4-10 mm, including large cluster in lower pole and upper pole calyces, total stone burden >2.5 cm, estimate 70% stone free, will need repeat ureteroscopy    Home-going instructions-----------------         Activity Limitation:     - No driving or operating heavy machinery while on narcotic pain medication.     FOLLOW THESE INSTRUCTIONS AS INDICATED BELOW:  - Observe operative area for signs of excessive bleeding.  - You may shower.  - Increase fluid intake to promote clear urine.  - Resume usual diet as tolerated    What to expect while recovering-----------  - You may experience some intermittent bleeding that makes your urine pink or cherry colored. This is normal.  - However, if you are unable to urinate, passing large amount of clots, have meghan blood in your urine, or have a temperature >101 degrees, call the urology nurse on call, or present to your nearest emergency department.  - You are encouraged to walk daily, and have no activity restrictions.   - A URETERAL STENT has been placed that allows urine to flow unobstructed from your kidney into your bladder.  The stent has a curl in the kidney and a curl in the bladder.  The curl in the bladder can cause some urgency and frequency of urination as well as some mild blood in the urine.  The curl in the kidney can cause some mild flank discomfort.  This may be more noticeable when you  urinate.  A URETERAL STENT is meant to be left in temporarily.  It must be removed or changed no later than 3 months after its insertion.  If it's not removed it can result in stone overgrowth on the stent that can cause pain, infection, and can be very difficult to remove.          Discharge Medications/instructions:   - Flomax (tamsulosin) to be taken daily until stent is removed  - Take Tylenol 1000mg every 6 hours for pain  - Take Oxycodone 5mg every 6 hours only for break through pain  - Take Colace while taking Oxycodone to prevent constipation      Questions/concerns------------------------  [facility]: [phone]    Future appointments  We will need to set you up for a repeat ureteroscopy to clear the stone burden on the left side      Sheng Barrientos MD     MAY RESUME COUMADIN TODAY  Same Day Surgery Discharge Instructions for  Sedation and General Anesthesia     It's not unusual to feel dizzy, light-headed or faint for up to 24 hours after surgery or while taking pain medication.  If you have these symptoms: sit for a few minutes before standing and have someone assist you when you get up to walk or use the bathroom.    You should rest and relax for the next 24 hours. We recommend you make arrangements to have an adult stay with you for at least 24 hours after your discharge.  Avoid hazardous and strenuous activity.    DO NOT DRIVE any vehicle or operate mechanical equipment for 24 hours following the end of your surgery.  Even though you may feel normal, your reactions may be affected by the medication you have received.    Do not drink alcoholic beverages for 24 hours following surgery.     Slowly progress to your regular diet as you feel able. It's not unusual to feel nauseated and/or vomit after receiving anesthesia.  If you develop these symptoms, drink clear liquids (apple juice, ginger ale, broth, 7-up, etc. ) until you feel better.  If your nausea and vomiting  persists for 24 hours, please notify your surgeon.      All narcotic pain medications, along with inactivity and anesthesia, can cause constipation. Drinking plenty of liquids and increasing fiber intake will help.    For any questions of a medical nature, call your surgeon.    Do not make important decisions for 24 hours.    If you had general anesthesia, you may have a sore throat for a couple of days related to the breathing tube used during surgery.  You may use Cepacol lozenges to help with this discomfort.  If it worsens or if you develop a fever, contact your surgeon.     If you feel your pain is not well managed with the pain medications prescribed by your surgeon, please contact your surgeon's office to let them know so they can address your concerns.         **If you have questions or concerns about your procedure,   call Dr. Barrientos [phone]**

## 2023-09-27 LAB
APPEARANCE STONE: NORMAL
COMPN STONE: NORMAL
SPECIMEN WT: 46 MG

## 2023-09-28 ENCOUNTER — PREP FOR PROCEDURE (OUTPATIENT)
Dept: UROLOGY | Facility: CLINIC | Age: 48
End: 2023-09-28
Payer: COMMERCIAL

## 2023-09-28 DIAGNOSIS — N20.0 KIDNEY STONE ON LEFT SIDE: Primary | ICD-10-CM

## 2023-09-29 ENCOUNTER — APPOINTMENT (OUTPATIENT)
Dept: CT IMAGING | Facility: CLINIC | Age: 48
End: 2023-09-29
Attending: EMERGENCY MEDICINE
Payer: COMMERCIAL

## 2023-09-29 ENCOUNTER — HOSPITAL ENCOUNTER (INPATIENT)
Facility: CLINIC | Age: 48
LOS: 3 days | Discharge: HOME | End: 2023-10-02
Attending: EMERGENCY MEDICINE | Admitting: HOSPITALIST
Payer: COMMERCIAL

## 2023-09-29 DIAGNOSIS — N39.0 COMPLICATED UTI (URINARY TRACT INFECTION): ICD-10-CM

## 2023-09-29 DIAGNOSIS — A41.9 SEVERE SEPSIS (H): ICD-10-CM

## 2023-09-29 DIAGNOSIS — R65.20 SEVERE SEPSIS (H): ICD-10-CM

## 2023-09-29 LAB
ALBUMIN SERPL BCG-MCNC: 3.9 G/DL (ref 3.5–5.2)
ALBUMIN UR-MCNC: 100 MG/DL
ALP SERPL-CCNC: 58 U/L (ref 40–129)
ALT SERPL W P-5'-P-CCNC: 110 U/L (ref 0–70)
ANION GAP SERPL CALCULATED.3IONS-SCNC: 11 MMOL/L (ref 7–15)
APPEARANCE UR: ABNORMAL
AST SERPL W P-5'-P-CCNC: 72 U/L (ref 0–45)
ATRIAL RATE - MUSE: 108 BPM
BACTERIA #/AREA URNS HPF: ABNORMAL /HPF
BASOPHILS # BLD AUTO: 0 10E3/UL (ref 0–0.2)
BASOPHILS NFR BLD AUTO: 0 %
BILIRUB SERPL-MCNC: 0.5 MG/DL
BILIRUB UR QL STRIP: NEGATIVE
BUN SERPL-MCNC: 18.6 MG/DL (ref 6–20)
CALCIUM SERPL-MCNC: 9.4 MG/DL (ref 8.6–10)
CHLORIDE SERPL-SCNC: 100 MMOL/L (ref 98–107)
COLOR UR AUTO: ABNORMAL
CREAT SERPL-MCNC: 1.33 MG/DL (ref 0.67–1.17)
DEPRECATED HCO3 PLAS-SCNC: 25 MMOL/L (ref 22–29)
DIASTOLIC BLOOD PRESSURE - MUSE: NORMAL MMHG
EGFRCR SERPLBLD CKD-EPI 2021: 65 ML/MIN/1.73M2
EOSINOPHIL # BLD AUTO: 0.1 10E3/UL (ref 0–0.7)
EOSINOPHIL NFR BLD AUTO: 1 %
ERYTHROCYTE [DISTWIDTH] IN BLOOD BY AUTOMATED COUNT: 13.3 % (ref 10–15)
FACT X ACT/NOR PPP CHRO: 45 % (ref 70–130)
GLUCOSE SERPL-MCNC: 117 MG/DL (ref 70–99)
GLUCOSE UR STRIP-MCNC: NEGATIVE MG/DL
HCT VFR BLD AUTO: 44.2 % (ref 40–53)
HGB BLD-MCNC: 14.8 G/DL (ref 13.3–17.7)
HGB UR QL STRIP: ABNORMAL
HOLD SPECIMEN: NORMAL
HOLD SPECIMEN: NORMAL
IMM GRANULOCYTES # BLD: 0.1 10E3/UL
IMM GRANULOCYTES NFR BLD: 1 %
INR PPP: 1.43 (ref 0.85–1.15)
INTERPRETATION ECG - MUSE: NORMAL
KETONES UR STRIP-MCNC: NEGATIVE MG/DL
LACTATE SERPL-SCNC: 1.8 MMOL/L (ref 0.7–2)
LACTATE SERPL-SCNC: 2.3 MMOL/L (ref 0.7–2)
LEUKOCYTE ESTERASE UR QL STRIP: ABNORMAL
LYMPHOCYTES # BLD AUTO: 0.4 10E3/UL (ref 0.8–5.3)
LYMPHOCYTES NFR BLD AUTO: 3 %
MCH RBC QN AUTO: 28.5 PG (ref 26.5–33)
MCHC RBC AUTO-ENTMCNC: 33.5 G/DL (ref 31.5–36.5)
MCV RBC AUTO: 85 FL (ref 78–100)
MONOCYTES # BLD AUTO: 0.5 10E3/UL (ref 0–1.3)
MONOCYTES NFR BLD AUTO: 5 %
MUCOUS THREADS #/AREA URNS LPF: PRESENT /LPF
NEUTROPHILS # BLD AUTO: 10 10E3/UL (ref 1.6–8.3)
NEUTROPHILS NFR BLD AUTO: 90 %
NITRATE UR QL: POSITIVE
NRBC # BLD AUTO: 0 10E3/UL
NRBC BLD AUTO-RTO: 0 /100
P AXIS - MUSE: 9 DEGREES
PH UR STRIP: 7 [PH] (ref 5–7)
PLATELET # BLD AUTO: 183 10E3/UL (ref 150–450)
POTASSIUM SERPL-SCNC: 3.8 MMOL/L (ref 3.4–5.3)
PR INTERVAL - MUSE: 170 MS
PROT SERPL-MCNC: 6.3 G/DL (ref 6.4–8.3)
QRS DURATION - MUSE: 106 MS
QT - MUSE: 354 MS
QTC - MUSE: 474 MS
R AXIS - MUSE: -6 DEGREES
RBC # BLD AUTO: 5.19 10E6/UL (ref 4.4–5.9)
RBC URINE: >182 /HPF
SODIUM SERPL-SCNC: 136 MMOL/L (ref 135–145)
SP GR UR STRIP: 1.01 (ref 1–1.03)
SQUAMOUS EPITHELIAL: <1 /HPF
SYSTOLIC BLOOD PRESSURE - MUSE: NORMAL MMHG
T AXIS - MUSE: 29 DEGREES
UROBILINOGEN UR STRIP-MCNC: NORMAL MG/DL
VENTRICULAR RATE- MUSE: 108 BPM
WBC # BLD AUTO: 11.1 10E3/UL (ref 4–11)
WBC URINE: 97 /HPF

## 2023-09-29 PROCEDURE — 250N000011 HC RX IP 250 OP 636: Mod: JZ | Performed by: HOSPITALIST

## 2023-09-29 PROCEDURE — 36415 COLL VENOUS BLD VENIPUNCTURE: CPT | Performed by: EMERGENCY MEDICINE

## 2023-09-29 PROCEDURE — 83605 ASSAY OF LACTIC ACID: CPT | Performed by: EMERGENCY MEDICINE

## 2023-09-29 PROCEDURE — 96375 TX/PRO/DX INJ NEW DRUG ADDON: CPT

## 2023-09-29 PROCEDURE — 99207 PR APP CREDIT; MD BILLING SHARED VISIT: CPT | Performed by: INTERNAL MEDICINE

## 2023-09-29 PROCEDURE — 83605 ASSAY OF LACTIC ACID: CPT | Performed by: HOSPITALIST

## 2023-09-29 PROCEDURE — 36415 COLL VENOUS BLD VENIPUNCTURE: CPT | Performed by: HOSPITALIST

## 2023-09-29 PROCEDURE — 96365 THER/PROPH/DIAG IV INF INIT: CPT | Mod: 59

## 2023-09-29 PROCEDURE — 99285 EMERGENCY DEPT VISIT HI MDM: CPT | Mod: 25

## 2023-09-29 PROCEDURE — 250N000012 HC RX MED GY IP 250 OP 636 PS 637: Performed by: HOSPITALIST

## 2023-09-29 PROCEDURE — 96376 TX/PRO/DX INJ SAME DRUG ADON: CPT

## 2023-09-29 PROCEDURE — 258N000003 HC RX IP 258 OP 636: Performed by: HOSPITALIST

## 2023-09-29 PROCEDURE — 250N000013 HC RX MED GY IP 250 OP 250 PS 637: Performed by: HOSPITALIST

## 2023-09-29 PROCEDURE — 81001 URINALYSIS AUTO W/SCOPE: CPT | Performed by: EMERGENCY MEDICINE

## 2023-09-29 PROCEDURE — 250N000013 HC RX MED GY IP 250 OP 250 PS 637: Performed by: EMERGENCY MEDICINE

## 2023-09-29 PROCEDURE — 87088 URINE BACTERIA CULTURE: CPT | Performed by: EMERGENCY MEDICINE

## 2023-09-29 PROCEDURE — 87077 CULTURE AEROBIC IDENTIFY: CPT | Performed by: EMERGENCY MEDICINE

## 2023-09-29 PROCEDURE — 258N000003 HC RX IP 258 OP 636: Performed by: EMERGENCY MEDICINE

## 2023-09-29 PROCEDURE — 85260 CLOT FACTOR X STUART-POWER: CPT | Performed by: HOSPITALIST

## 2023-09-29 PROCEDURE — 85610 PROTHROMBIN TIME: CPT | Performed by: EMERGENCY MEDICINE

## 2023-09-29 PROCEDURE — 80053 COMPREHEN METABOLIC PANEL: CPT | Performed by: EMERGENCY MEDICINE

## 2023-09-29 PROCEDURE — 250N000009 HC RX 250: Performed by: EMERGENCY MEDICINE

## 2023-09-29 PROCEDURE — 250N000011 HC RX IP 250 OP 636: Performed by: EMERGENCY MEDICINE

## 2023-09-29 PROCEDURE — 120N000001 HC R&B MED SURG/OB

## 2023-09-29 PROCEDURE — 99223 1ST HOSP IP/OBS HIGH 75: CPT | Performed by: HOSPITALIST

## 2023-09-29 PROCEDURE — 87149 DNA/RNA DIRECT PROBE: CPT | Performed by: EMERGENCY MEDICINE

## 2023-09-29 PROCEDURE — 250N000013 HC RX MED GY IP 250 OP 250 PS 637: Performed by: INTERNAL MEDICINE

## 2023-09-29 PROCEDURE — 99221 1ST HOSP IP/OBS SF/LOW 40: CPT | Performed by: STUDENT IN AN ORGANIZED HEALTH CARE EDUCATION/TRAINING PROGRAM

## 2023-09-29 PROCEDURE — 250N000011 HC RX IP 250 OP 636: Mod: JZ | Performed by: EMERGENCY MEDICINE

## 2023-09-29 PROCEDURE — 85025 COMPLETE CBC W/AUTO DIFF WBC: CPT | Performed by: EMERGENCY MEDICINE

## 2023-09-29 PROCEDURE — 93005 ELECTROCARDIOGRAM TRACING: CPT

## 2023-09-29 PROCEDURE — 74177 CT ABD & PELVIS W/CONTRAST: CPT

## 2023-09-29 PROCEDURE — 96361 HYDRATE IV INFUSION ADD-ON: CPT

## 2023-09-29 RX ORDER — SODIUM CHLORIDE, SODIUM LACTATE, POTASSIUM CHLORIDE, CALCIUM CHLORIDE 600; 310; 30; 20 MG/100ML; MG/100ML; MG/100ML; MG/100ML
INJECTION, SOLUTION INTRAVENOUS CONTINUOUS
Status: DISCONTINUED | OUTPATIENT
Start: 2023-09-29 | End: 2023-10-02 | Stop reason: HOSPADM

## 2023-09-29 RX ORDER — ONDANSETRON 2 MG/ML
4 INJECTION INTRAMUSCULAR; INTRAVENOUS EVERY 6 HOURS PRN
Status: DISCONTINUED | OUTPATIENT
Start: 2023-09-29 | End: 2023-10-02 | Stop reason: HOSPADM

## 2023-09-29 RX ORDER — ACETAMINOPHEN 500 MG
1000 TABLET ORAL EVERY 6 HOURS PRN
Status: DISCONTINUED | OUTPATIENT
Start: 2023-09-29 | End: 2023-10-02 | Stop reason: ALTCHOICE

## 2023-09-29 RX ORDER — ACETAMINOPHEN 650 MG/1
650 SUPPOSITORY RECTAL EVERY 6 HOURS PRN
Status: DISCONTINUED | OUTPATIENT
Start: 2023-09-29 | End: 2023-10-02 | Stop reason: HOSPADM

## 2023-09-29 RX ORDER — FLUTICASONE PROPIONATE 50 MCG
1-2 SPRAY, SUSPENSION (ML) NASAL DAILY PRN
Status: DISCONTINUED | OUTPATIENT
Start: 2023-09-29 | End: 2023-10-02 | Stop reason: HOSPADM

## 2023-09-29 RX ORDER — ONDANSETRON 4 MG/1
4 TABLET, ORALLY DISINTEGRATING ORAL EVERY 6 HOURS PRN
Status: DISCONTINUED | OUTPATIENT
Start: 2023-09-29 | End: 2023-10-02 | Stop reason: HOSPADM

## 2023-09-29 RX ORDER — VENLAFAXINE HYDROCHLORIDE 150 MG/1
150 CAPSULE, EXTENDED RELEASE ORAL 2 TIMES DAILY
Status: DISCONTINUED | OUTPATIENT
Start: 2023-09-29 | End: 2023-10-02 | Stop reason: HOSPADM

## 2023-09-29 RX ORDER — AMOXICILLIN 250 MG
2 CAPSULE ORAL 2 TIMES DAILY PRN
Status: DISCONTINUED | OUTPATIENT
Start: 2023-09-29 | End: 2023-10-02 | Stop reason: HOSPADM

## 2023-09-29 RX ORDER — WARFARIN SODIUM 5 MG/1
10 TABLET ORAL
Status: COMPLETED | OUTPATIENT
Start: 2023-09-29 | End: 2023-09-29

## 2023-09-29 RX ORDER — VENLAFAXINE HYDROCHLORIDE 150 MG/1
150 CAPSULE, EXTENDED RELEASE ORAL 2 TIMES DAILY
Status: CANCELLED | OUTPATIENT
Start: 2023-09-29

## 2023-09-29 RX ORDER — AMOXICILLIN 250 MG
1 CAPSULE ORAL 2 TIMES DAILY PRN
Status: DISCONTINUED | OUTPATIENT
Start: 2023-09-29 | End: 2023-10-02 | Stop reason: HOSPADM

## 2023-09-29 RX ORDER — PROPRANOLOL HYDROCHLORIDE 10 MG/1
10 TABLET ORAL 2 TIMES DAILY PRN
Status: DISCONTINUED | OUTPATIENT
Start: 2023-09-29 | End: 2023-10-02 | Stop reason: HOSPADM

## 2023-09-29 RX ORDER — IOPAMIDOL 755 MG/ML
500 INJECTION, SOLUTION INTRAVASCULAR ONCE
Status: COMPLETED | OUTPATIENT
Start: 2023-09-29 | End: 2023-09-29

## 2023-09-29 RX ORDER — PREDNISONE 5 MG/1
12.5 TABLET ORAL DAILY
COMMUNITY

## 2023-09-29 RX ORDER — BISACODYL 10 MG
10 SUPPOSITORY, RECTAL RECTAL DAILY PRN
Status: DISCONTINUED | OUTPATIENT
Start: 2023-09-29 | End: 2023-10-02 | Stop reason: HOSPADM

## 2023-09-29 RX ORDER — DOCUSATE SODIUM 100 MG/1
100 CAPSULE, LIQUID FILLED ORAL 2 TIMES DAILY PRN
Status: ON HOLD | COMMUNITY
End: 2023-10-10

## 2023-09-29 RX ORDER — MEROPENEM 1 G/1
1 INJECTION, POWDER, FOR SOLUTION INTRAVENOUS EVERY 8 HOURS
Status: DISCONTINUED | OUTPATIENT
Start: 2023-09-29 | End: 2023-10-01

## 2023-09-29 RX ORDER — ONDANSETRON 2 MG/ML
4 INJECTION INTRAMUSCULAR; INTRAVENOUS ONCE
Status: COMPLETED | OUTPATIENT
Start: 2023-09-29 | End: 2023-09-29

## 2023-09-29 RX ORDER — ATORVASTATIN CALCIUM 10 MG/1
10 TABLET, FILM COATED ORAL AT BEDTIME
Status: DISCONTINUED | OUTPATIENT
Start: 2023-09-29 | End: 2023-10-02 | Stop reason: HOSPADM

## 2023-09-29 RX ORDER — LANOLIN ALCOHOL/MO/W.PET/CERES
3 CREAM (GRAM) TOPICAL
Status: DISCONTINUED | OUTPATIENT
Start: 2023-09-29 | End: 2023-10-02 | Stop reason: HOSPADM

## 2023-09-29 RX ORDER — LORAZEPAM 0.5 MG/1
0.5 TABLET ORAL EVERY 6 HOURS PRN
Status: DISCONTINUED | OUTPATIENT
Start: 2023-09-29 | End: 2023-10-02 | Stop reason: HOSPADM

## 2023-09-29 RX ORDER — OXYBUTYNIN CHLORIDE 5 MG/1
5 TABLET ORAL 3 TIMES DAILY PRN
Status: DISCONTINUED | OUTPATIENT
Start: 2023-09-29 | End: 2023-10-02 | Stop reason: HOSPADM

## 2023-09-29 RX ORDER — DOCUSATE SODIUM 100 MG/1
100 CAPSULE, LIQUID FILLED ORAL 2 TIMES DAILY PRN
Status: DISCONTINUED | OUTPATIENT
Start: 2023-09-29 | End: 2023-10-02 | Stop reason: HOSPADM

## 2023-09-29 RX ORDER — POLYETHYLENE GLYCOL 3350 17 G/17G
17 POWDER, FOR SOLUTION ORAL DAILY PRN
Status: DISCONTINUED | OUTPATIENT
Start: 2023-09-29 | End: 2023-10-02 | Stop reason: HOSPADM

## 2023-09-29 RX ORDER — ACETAMINOPHEN 325 MG/1
650 TABLET ORAL EVERY 6 HOURS PRN
Status: DISCONTINUED | OUTPATIENT
Start: 2023-09-29 | End: 2023-10-02 | Stop reason: HOSPADM

## 2023-09-29 RX ORDER — HYDROXYCHLOROQUINE SULFATE 200 MG/1
200 TABLET, FILM COATED ORAL 2 TIMES DAILY
Status: DISCONTINUED | OUTPATIENT
Start: 2023-09-29 | End: 2023-10-02 | Stop reason: HOSPADM

## 2023-09-29 RX ORDER — HYDROMORPHONE HYDROCHLORIDE 1 MG/ML
0.5 INJECTION, SOLUTION INTRAMUSCULAR; INTRAVENOUS; SUBCUTANEOUS
Status: DISCONTINUED | OUTPATIENT
Start: 2023-09-29 | End: 2023-09-29

## 2023-09-29 RX ORDER — HYDROMORPHONE HCL IN WATER/PF 6 MG/30 ML
0.2 PATIENT CONTROLLED ANALGESIA SYRINGE INTRAVENOUS
Status: DISCONTINUED | OUTPATIENT
Start: 2023-09-29 | End: 2023-10-02 | Stop reason: HOSPADM

## 2023-09-29 RX ORDER — MEROPENEM 1 G/1
1 INJECTION, POWDER, FOR SOLUTION INTRAVENOUS ONCE
Status: COMPLETED | OUTPATIENT
Start: 2023-09-29 | End: 2023-09-29

## 2023-09-29 RX ORDER — ACETAMINOPHEN 500 MG
1000 TABLET ORAL ONCE
Status: COMPLETED | OUTPATIENT
Start: 2023-09-29 | End: 2023-09-29

## 2023-09-29 RX ADMIN — HYDROMORPHONE HYDROCHLORIDE 0.5 MG: 1 INJECTION, SOLUTION INTRAMUSCULAR; INTRAVENOUS; SUBCUTANEOUS at 02:30

## 2023-09-29 RX ADMIN — SODIUM CHLORIDE, POTASSIUM CHLORIDE, SODIUM LACTATE AND CALCIUM CHLORIDE: 600; 310; 30; 20 INJECTION, SOLUTION INTRAVENOUS at 03:32

## 2023-09-29 RX ADMIN — ATORVASTATIN CALCIUM 10 MG: 10 TABLET, FILM COATED ORAL at 21:08

## 2023-09-29 RX ADMIN — HYDROXYCHLOROQUINE SULFATE 200 MG: 200 TABLET, FILM COATED ORAL at 21:08

## 2023-09-29 RX ADMIN — SODIUM CHLORIDE 61 ML: 9 INJECTION, SOLUTION INTRAVENOUS at 02:10

## 2023-09-29 RX ADMIN — ACETAMINOPHEN 1000 MG: 500 TABLET, FILM COATED ORAL at 02:25

## 2023-09-29 RX ADMIN — IOPAMIDOL 84 ML: 755 INJECTION, SOLUTION INTRAVENOUS at 02:10

## 2023-09-29 RX ADMIN — ACETAMINOPHEN 650 MG: 325 TABLET, FILM COATED ORAL at 08:03

## 2023-09-29 RX ADMIN — MEROPENEM 1 G: 1 INJECTION, POWDER, FOR SOLUTION INTRAVENOUS at 10:25

## 2023-09-29 RX ADMIN — HYDROMORPHONE HYDROCHLORIDE 0.5 MG: 1 INJECTION, SOLUTION INTRAMUSCULAR; INTRAVENOUS; SUBCUTANEOUS at 01:29

## 2023-09-29 RX ADMIN — VENLAFAXINE HYDROCHLORIDE 150 MG: 150 CAPSULE, EXTENDED RELEASE ORAL at 21:08

## 2023-09-29 RX ADMIN — WARFARIN SODIUM 10 MG: 5 TABLET ORAL at 18:21

## 2023-09-29 RX ADMIN — ACETAMINOPHEN 650 MG: 325 TABLET, FILM COATED ORAL at 15:18

## 2023-09-29 RX ADMIN — PREDNISONE 12.5 MG: 2.5 TABLET ORAL at 08:03

## 2023-09-29 RX ADMIN — ACETAMINOPHEN 1000 MG: 500 TABLET, FILM COATED ORAL at 21:08

## 2023-09-29 RX ADMIN — MEROPENEM 1 G: 1 INJECTION, POWDER, FOR SOLUTION INTRAVENOUS at 02:29

## 2023-09-29 RX ADMIN — SODIUM CHLORIDE, POTASSIUM CHLORIDE, SODIUM LACTATE AND CALCIUM CHLORIDE: 600; 310; 30; 20 INJECTION, SOLUTION INTRAVENOUS at 21:07

## 2023-09-29 RX ADMIN — MEROPENEM 1 G: 1 INJECTION, POWDER, FOR SOLUTION INTRAVENOUS at 18:21

## 2023-09-29 RX ADMIN — ONDANSETRON 4 MG: 2 INJECTION INTRAMUSCULAR; INTRAVENOUS at 01:09

## 2023-09-29 RX ADMIN — HYDROMORPHONE HYDROCHLORIDE 0.2 MG: 0.2 INJECTION, SOLUTION INTRAMUSCULAR; INTRAVENOUS; SUBCUTANEOUS at 05:59

## 2023-09-29 RX ADMIN — SODIUM CHLORIDE, POTASSIUM CHLORIDE, SODIUM LACTATE AND CALCIUM CHLORIDE: 600; 310; 30; 20 INJECTION, SOLUTION INTRAVENOUS at 12:25

## 2023-09-29 RX ADMIN — SODIUM CHLORIDE 1000 ML: 9 INJECTION, SOLUTION INTRAVENOUS at 01:09

## 2023-09-29 ASSESSMENT — ACTIVITIES OF DAILY LIVING (ADL)
ADLS_ACUITY_SCORE: 39
ADLS_ACUITY_SCORE: 39
ADLS_ACUITY_SCORE: 37
ADLS_ACUITY_SCORE: 39
ADLS_ACUITY_SCORE: 37
ADLS_ACUITY_SCORE: 39

## 2023-09-29 NOTE — H&P
[facility]  Hospitalist H&P    Name: Jim Flores      MRN: [id number]  YOB: 1975    Age: 48 year old  Date of admission: 9/29/2023  Primary care provider: Good Mooney            Assessment and Plan:     Jim Flores is a 48 year old male with a history of SLE, antiphospholipid antibody on chronic anticoagulation, anxiety, hypertension, nephrolithiasis, PE, GERD and hyperparathyroidism who presents with fever and chills in the context of recent lithotripsy and bilateral ureteral stents with work-up suggestive of a complicated UTI.    Sepsis secondary to complicated urinary tract infection: This is in the context of a lithotripsy with bilateral ureteral stents performed on Monday of this week.  Sepsis as evidenced by fever, tachycardia, leukocytosis and lactic acidosis.  He received 1 L of normal saline in the emergency department.  We will continue lactated Ringer at 125 cc/h.  UA is grossly abnormal and consistent with urinary tract infection.  CT shows evidence of bilateral ureteral stents which are partially occluded as well as urothelial enhancement of the bilateral renal pelvises and ureters suggestive of a UTI.  We will place a formal urology consult, Dr. Paniagua has indicated there is no need for emergent intervention tonight.  We will place him n.p.o. in case intervention is warranted later in the day today.  He has numerous antibiotic allergies so we will continue treatment with meropenem and follow-up blood and urine cultures.  He is hemodynamically stable, we will check a follow-up lactate level to ensure it is improving.  Acute kidney injury: Creatinine is slightly elevated from baseline currently at 1.33 with a baseline of 1.1.  Continue fluid resuscitation and sepsis management.  Antiphospholipid antibody: He is chronically anticoagulated on warfarin which is monitored by his chromogenic factor X level.  I will send a chromogenic factor X level at this time.   Given his hematuria and possible need for urological intervention we will hold anticoagulation for the time being.  SLE and Sjogren's: He recently experienced a flare and was on higher dose prednisone, currently weaning down and on 12.5 mg daily (this will be ordered for the morning).  He baseline dose is 7.5 mg daily.  We will have a low threshold for stress dose steroids.  We will hold his prior to admission hydroxychloroquine for now given active infection.  Blood pressure medication usage: Has a prescription for propranolol that he was prescribed for anxiety but has not taken this for quite some time.  Was also previously on hydrochlorothiazide for his nephrolithiasis but again has not taken this in quite some time.  Hyperlipidemia: His liver tests are slightly elevated, hold atorvastatin for now.  Transaminitis: Could be due to sepsis.  Follow periodically.  He denies any right upper quadrant pain.  Anxiety: Continue prior to admission venlafaxine.    Clinically Significant Risk Factors Present on Admission               # Drug Induced Coagulation Defect: home medication list includes an anticoagulant medication                    Code status: Full.  Admit to inpatient status.  Prophylaxis: Warfarin.  Disposition: Home in 3 to 4 days.    80 MINUTES SPENT BY ME on the date of service doing chart review, history, exam, documentation & further activities per the note.          Chief Complaint:     Fever.         History of Present Illness:   Jim Flores is a 48 year old male who presents with fever.  History was obtained from my discussion with the patient at the bedside.  I also discussed the case with the ED provider.  The electronic medical record was also reviewed.    On 9/25 the patient had a lithotripsy with bilateral stent placement.  The procedure went well without complications and he was discharged the same day.  2 days ago he noticed some degree of dysuria.  This is gradually worsened since that time.   He is also now noticed some degree of hematuria.  Subsequently he began to develop chills and sweats.  He also noticed some fevers.  He admits to some low back pain as well as fairly diffuse but most notably lower and bilateral abdominal pain.  He admits to some nausea and vomiting as well as nonbloody diarrhea.  Ultimately he comes to the hospital for evaluation.    Here in the emergency department he has a temperature of 101.2, heart rate 127, blood pressure 170/104, respiratory 20 and oxygen saturation 98% on room air.  Labs show normal basic metabolic panel except for creatinine of 1.33.  Liver function tests are normal except for  and AST 72.  Lactate is 2.3.  CBC notable for white blood cells of 11.1.  Urinalysis is diffusely abnormal and suggestive of urinary tract infection.  CT shows bilateral ureteral stents with mild dilation of bilateral infrarenal collecting systems and ureters which is suggestive of a partial occlusion of the stents as well as urothelial enhancement of bilateral renal pelvises and ureters suggestive of upper UTI.  Urology was contacted and did not recommend emergent intervention but did indicate to treat this as a UTI.            Past Medical History:     Past Medical History:   Diagnosis Date    Antiphospholipid antibody positive     Anxiety state, unspecified     Cardiomegaly 9/7/2023    Hypercalciuria     Nephrolithiasis     Pulmonary infarct (H) 9/1/2008    SLE with normal kidneys (H)              Past Surgical History:     Past Surgical History:   Procedure Laterality Date    BIOPSY  2014, 2021    Mole check - all good    COLONOSCOPY  11/19/2012    Procedure: COLONOSCOPY;  Colonoscopy;  Surgeon: Lupe Ratliff MD;  Location:  GI    COLONOSCOPY Left 4/12/2022    Procedure: COLONOSCOPY, FLEXIBLE, WITH POLYP REMOVAL USING HOT SNARE;  Surgeon: Lupe Ratliff MD;  Location:  GI    COMBINED CYSTOSCOPY, RETROGRADES, URETEROSCOPY, LASER HOLMIUM LITHOTRIPSY  URETER(S), INSERT STENT Left 6/24/2015    Procedure: COMBINED CYSTOSCOPY, RETROGRADES, URETEROSCOPY, LASER HOLMIUM LITHOTRIPSY URETER(S), INSERT STENT;  Surgeon: Ramesh Johnson MD;  Location: [facility] OR    COMBINED CYSTOSCOPY, RETROGRADES, URETEROSCOPY, LASER HOLMIUM LITHOTRIPSY URETER(S), INSERT STENT Bilateral 9/25/2023    Procedure: Cystoscopy, bilateral ureteroscopy with holmium laser lithotripsy, bilateral ureteroscopy with stone basketing, bilateral retrograde pyelogram, bilateral ureteral stent exchange;  Surgeon: Sheng Barrientos MD;  Location: [facility] OR    CYSTOSCOPY, RETROGRADES, INSERT STENT URETER(S), COMBINED Bilateral 9/7/2023    Procedure: 1. Cystourethroscopy with bilateral ureteral stent placement 2. Bilateral retrograde pyelography with interpretation of intraoperative fluoroscopic imaging;  Surgeon: Demetris Castillo MD;  Location: [facility] OR    GENITOURINARY SURGERY  2015 2018    kidney stone lithotripsy    HEAD & NECK SURGERY  2018    parathyroidectomy    [facility] stone remov      at [facility] 2018    LASER HOLMIUM LITHOTRIPSY URETER(S), INSERT STENT, COMBINED Right 11/11/2015    Procedure: COMBINED CYSTOSCOPY, URETEROSCOPY, LASER HOLMIUM LITHOTRIPSY URETER(S), INSERT STENT;  Surgeon: Ramesh Johnson MD;  Location: [facility] OR    ZZHC REPAIR INCISIONAL HERNIA,REDUCIBLE  1991    Hernia Repair, Incisional, Unilateral             Social History:     Social History     Tobacco Use    Smoking status: Never    Smokeless tobacco: Never   Substance Use Topics    Alcohol use: Yes     Alcohol/week: 0.0 standard drinks of alcohol     Comment: three per week             Family History:   The family history was fully reviewed and non-contributory in this case.         Allergies:     Allergies   Allergen Reactions    Ampicillin Hives    Bactrim Hives    Ceftazidime     Cefuroxime      Headache, stiff neck and joint pain    Cipro [Ciprofloxacin]      Stiff neck, joint pain, muscle aches    Flomax  [Tamsulosin]      Swelling on  face    Ketorolac Tromethamine Itching    [place] Flavor Unknown     rodolfo fruit gives lip swelling, hives    Penicillins Hives and Unknown    Poison Ivy Extract Unknown    Sulfamethoxazole-Trimethoprim Unknown    Tramadol Itching    Trimethoprim Hives             Medications:     Prior to Admission medications    Medication Sig Last Dose Taking? Auth Provider Long Term End Date   acetaminophen (TYLENOL) 500 MG tablet Take 2 tablets (1,000 mg) by mouth every 6 hours as needed for mild pain   Sheng Barrientos MD     atorvastatin (LIPITOR) 10 MG tablet Take 1 tablet (10 mg) by mouth daily   Good Mooney MD Yes    docusate sodium (COLACE) 100 MG capsule Take 1 capsule (100 mg) by mouth 2 times daily   Sheng Barrientos MD     fluticasone (FLONASE) 50 MCG/ACT nasal spray Spray 1-2 sprays into both nostrils daily as needed for rhinitis or allergies   Unknown, Entered By History No    hydroxychloroquine (PLAQUENIL) 200 MG tablet Take 200 mg by mouth 2 times daily.   Reported, Patient     ketoconazole (NIZORAL) 2 % external shampoo Use topically 1-2 times a week   Unknown, Entered By History     LORazepam (ATIVAN) 0.5 MG tablet Take 1 tablet (0.5 mg) by mouth every 6 hours as needed for anxiety   Good Mooney MD     NONFORMULARY Take 1 capsule by mouth 2 times daily Methyl Guard Plus   Unknown, Entered By History     Omega-3 Fatty Acids (FISH OIL) 1200 MG CPDR Take 1,200 Units by mouth 2 times daily   Unknown, Entered By History     oxyBUTYnin (DITROPAN) 5 MG tablet Take 1 tablet (5 mg) by mouth 3 times daily as needed for bladder spasms   Sheng Barrientos MD     oxyCODONE (ROXICODONE) 5 MG tablet Take 1 tablet (5 mg) by mouth every 6 hours as needed for severe pain (IF pain not managed with non-pharmacological and non-opioid interventions)   Sheng Barrientos MD No    predniSONE (DELTASONE) 20 MG tablet Take 20 mg by mouth daily x5 days then taper dose down   Unknown, Entered By  History Yes 9/8/23   predniSONE (DELTASONE) 5 MG tablet Take 1.5 tablets (7.5 mg) by mouth daily   Good Mooney MD Yes    propranolol (INDERAL) 10 MG tablet Take 1 tablet (10 mg) by mouth 2 times daily as needed (anxiety)   Good Mooney MD Yes    tamsulosin (FLOMAX) 0.4 MG capsule Take 1 capsule (0.4 mg) by mouth daily While the stent is in place for stent pain and irritation   Sheng Barrientos MD No    triamcinolone (KENALOG) 0.1 % external cream Apply topically to affected areas 1-2 times a week   Unknown, Entered By History     venlafaxine (EFFEXOR-ER) 150 MG 24 hr tablet Take 150 mg by mouth 2 times daily   Unknown, Entered By History No    VITAMIN D, CHOLECALCIFEROL, PO Take 1,000 Units by mouth daily   Unknown, Entered By History     warfarin ANTICOAGULANT (COUMADIN) 5 MG tablet Take by mouth daily , 7.5mg on Wednesdays & 10mg on all other days of the week   Unknown, Entered By History No              Review of Systems:     A Comprehensive greater than 10 system review of systems was carried out.  Pertinent positives and negatives are noted above.  Otherwise negative for contributory information.           Physical Exam:   Blood pressure (!) 152/97, pulse (!) 126, temperature (!) 101.2  F (38.4  C), temperature source Temporal, resp. rate 20, SpO2 94 %.  Wt Readings from Last 1 Encounters:   09/25/23 75.8 kg (167 lb)     Exam:  GENERAL: No apparent distress. Awake, alert, and fully oriented.  HEENT: Normocephalic, atraumatic. Extraocular movements intact.  CARDIOVASCULAR: Regular rate and rhythm without murmurs or rubs. No S3.  PULMONARY: Clear to auscultation bilaterally.  ABDOMINAL: Soft, non-tender, non-distended. Bowel sounds normoactive.   EXTREMITIES: No cyanosis or clubbing. No appreciable edema.  NEUROLOGICAL: CN 2-12 grossly intact, no focal neurological deficits.  DERMATOLOGICAL: No rash, ulcer, bruising, nor jaundice.          Data:   EKG:  Personally reviewed.     Laboratory:  Recent Labs    Lab 09/29/23  0102   WBC 11.1*   HGB 14.8   HCT 44.2   MCV 85        Recent Labs   Lab 09/29/23 0102      POTASSIUM 3.8   CHLORIDE 100   CO2 25   ANIONGAP 11   *   BUN 18.6   CR 1.33*   GFRESTIMATED 65   SANDY 9.4     Recent Labs   Lab 09/29/23 0102   AST 72*   *   ALKPHOS 58   BILITOTAL 0.5     Recent Labs   Lab 09/29/23 0102   LACT 2.3*     Recent Labs   Lab 09/29/23 0102   COLOR Pink*   APPEARANCE Slightly Cloudy*   URINEGLC Negative   URINEBILI Negative   URINEKETONE Negative   SG 1.015   UBLD Large*   URINEPH 7.0   PROTEIN 100*   NITRITE Positive*   LEUKEST Large*   RBCU >182*   WBCU 97*     No results for input(s): CULT in the last 168 hours.    Imaging:  Recent Results (from the past 24 hour(s))   CT Abdomen Pelvis w Contrast    Narrative    EXAM: CT ABDOMEN AND PELVIS WITH CONTRAST  LOCATION: [facility]  DATE/TIME: 9/29/2023 2:24 AM CDT    INDICATION: Recent bilateral ureteral stent placement. Lower abdominal pain, back pain and bilateral flank pain.  COMPARISON: 09/07/2023-CT chest, abdomen and pelvis.    TECHNIQUE: CT scan of the abdomen and pelvis was performed following injection of IV contrast. Multiplanar reformats were obtained. Dose reduction techniques were used.  CONTRAST: 84 mL Isovue-370.    FINDINGS:    LOWER CHEST: Unremarkable.    HEPATOBILIARY: Unremarkable.    SPLEEN: Unremarkable.    PANCREAS: Unremarkable.    ADRENAL GLANDS: Unremarkable.    KIDNEYS/BLADDER: Bilateral ureteral stents are in place with proximal pigtails in the renal pelvises and distal pigtails in the urinary bladder lumen. Mild dilatation of bilateral intrarenal collecting systems and ureters. Urothelial enhancement of   bilateral renal pelvises and ureters. Mild perinephric haziness bilaterally. Symmetrical enhancement of both kidneys. At least 3 nonobstructing calculi in the right kidney, the largest measuring 0.4 cm. Several nonobstructing left renal calculi, the    largest in the inferior pole and measuring 0.7 cm. No ureteral calculi. A few tiny calculi present in the dependent aspect of the urinary bladder lumen.    BOWEL: Normal appendix.    LYMPH NODES: Unremarkable.    PELVIC ORGANS: No acute findings.    MUSCULOSKELETAL: No acute findings.    OTHER: None.      Impression    IMPRESSION:   1.  Bilateral ureteral stents are in place. There is mild dilatation of bilateral intrarenal collecting systems and ureters, suggestive of partial occlusion of the stents.  2.  Urothelial enhancement of bilateral renal pelvises and ureters, suggestive of upper urinary tract infections or inflammation.  3.  No other acute abnormality identified in the abdomen or pelvis.  4.  Several nonobstructing bilateral renal calculi, left kidney more numerous than right.       Hima John DO MPH  [facility] Hospitalist  [address] [place] [address]  09/29/2023

## 2023-09-29 NOTE — PHARMACY-ADMISSION MEDICATION HISTORY
Pharmacist Admission Medication History    Admission medication history is complete. The information provided in this note is only as accurate as the sources available at the time of the update.    Medication reconciliation/reorder completed by provider prior to medication history? No    Information Source(s): Patient via in-person  Pertinent Information: had surgery MONDAY, resumed warfarin TUESDAY night    Changes made to PTA medication list:  Added: None  Deleted: tamsulosin  Changed: docusate (BID --> BID PRN), prednisone (7.5 mg --> 12.5 mg)    Medication Affordability:  Not including over the counter (OTC) medications, was there a time in the past 3 months when you did not take your medications as prescribed because of cost?: No    Allergies reviewed with patient and updates made in EHR: no  Medication History Completed By: Matt Dinh OSCAR 9/29/2023 7:38 AM    Prior to Admission medications    Medication Sig Last Dose Taking? Auth Provider Long Term End Date   acetaminophen (TYLENOL) 500 MG tablet Take 2 tablets (1,000 mg) by mouth every 6 hours as needed for mild pain Past Month Yes Sheng Barrientos MD     atorvastatin (LIPITOR) 10 MG tablet Take 1 tablet (10 mg) by mouth daily 9/28/2023 at [facility] Yes Good Mooney MD Yes    docusate sodium (COLACE) 100 MG capsule Take 100 mg by mouth 2 times daily as needed for constipation Past Week Yes Unknown, Entered By History     fluticasone (FLONASE) 50 MCG/ACT nasal spray Spray 1-2 sprays into both nostrils daily as needed for rhinitis or allergies Past Month Yes Unknown, Entered By History No    hydroxychloroquine (PLAQUENIL) 200 MG tablet Take 200 mg by mouth 2 times daily. 9/28/2023 at X 2 Yes Reported, Patient     ketoconazole (NIZORAL) 2 % external shampoo Use topically 1-2 times a week Past Month Yes Unknown, Entered By History     LORazepam (ATIVAN) 0.5 MG tablet Take 1 tablet (0.5 mg) by mouth every 6 hours as needed for anxiety Past Month Yes Jesica  Good LUCAS MD     NONFORMULARY Take 1 capsule by mouth 2 times daily Methyl Guard Plus Past Week Yes Unknown, Entered By History     Omega-3 Fatty Acids (FISH OIL) 1200 MG CPDR Take 1,200 Units by mouth 2 times daily Past Week Yes Unknown, Entered By History     oxyBUTYnin (DITROPAN) 5 MG tablet Take 1 tablet (5 mg) by mouth 3 times daily as needed for bladder spasms More than a month at NOT USING Yes Sheng Barrientos MD     oxyCODONE (ROXICODONE) 5 MG tablet Take 1 tablet (5 mg) by mouth every 6 hours as needed for severe pain (IF pain not managed with non-pharmacological and non-opioid interventions) Past Week Yes Sheng Barrientos MD No    predniSONE (DELTASONE) 5 MG tablet Take 12.5 mg by mouth daily 9/28/2023 at AM Yes Unknown, Entered By History No    propranolol (INDERAL) 10 MG tablet Take 1 tablet (10 mg) by mouth 2 times daily as needed (anxiety) Past Month Yes Good Mooney MD Yes    triamcinolone (KENALOG) 0.1 % external cream Apply topically to affected areas 1-2 times a week Past Month Yes Unknown, Entered By History     venlafaxine (EFFEXOR-ER) 150 MG 24 hr tablet Take 150 mg by mouth 2 times daily 9/28/2023 at X 2 Yes Unknown, Entered By History No    VITAMIN D, CHOLECALCIFEROL, PO Take 1,000 Units by mouth daily 9/28/2023 at AM Yes Unknown, Entered By History     warfarin ANTICOAGULANT (COUMADIN) 5 MG tablet Take by mouth daily , 7.5mg on Wednesdays & 10mg on all other days of the week 9/27/2023 at PM  Unknown, Entered By History No

## 2023-09-29 NOTE — ED NOTES
[facility]  ED Nurse Handoff Report    ED Chief complaint: Fever  . ED Diagnosis:   Final diagnoses:   None       Allergies:   Allergies   Allergen Reactions    Ampicillin Hives    Bactrim Hives    Ceftazidime     Cefuroxime      Headache, stiff neck and joint pain    Cipro [Ciprofloxacin]      Stiff neck, joint pain, muscle aches    Flomax [Tamsulosin]      Swelling on  face    Ketorolac Tromethamine Itching    [place] Flavor Unknown     rodolfo fruit gives lip swelling, hives    Penicillins Hives and Unknown    Poison Ivy Extract Unknown    Sulfamethoxazole-Trimethoprim Unknown    Tramadol Itching    Trimethoprim Hives       Code Status: Full Code    Activity level - Baseline/Home:  independent.  Activity Level - Current:   standby.   Lift room needed: No.   Bariatric: No   Needed: No   Isolation: No.   Infection: Not Applicable.     Respiratory status: Room air    Vital Signs (within 30 minutes):   Vitals:    09/29/23 0040 09/29/23 0139 09/29/23 0232   BP: (!) 170/104 122/73 (!) 151/94   Pulse: (!) 127 107 (!) 122   Resp: 20     Temp: (!) 101.2  F (38.4  C)     TempSrc: Temporal     SpO2: 98% 96% 99%       Cardiac Rhythm:  ,      Pain level:    Patient confused: No.   Patient Falls Risk: nonskid shoes/slippers when out of bed and patient and family education.   Elimination Status: Has voided     Patient Report - Initial Complaint: Fever.   Focused Assessment: GI- C/o lower abdominal pain with nausea, vomiting and loose stools. - Recent bilateral lithotripsy with stent placement at [facility]. Has had dysuria since. Urine aleah in color. Musculoskeletal- C/o lower back pain, but doesn't feel like it is in his kidneys. Skin- Pt with a fever starting today.     Abnormal Results:   Labs Ordered and Resulted from Time of ED Arrival to Time of ED Departure   COMPREHENSIVE METABOLIC PANEL - Abnormal       Result Value    Sodium 136      Potassium 3.8      Carbon Dioxide (CO2) 25      Anion Gap  11      Urea Nitrogen 18.6      Creatinine 1.33 (*)     GFR Estimate 65      Calcium 9.4      Chloride 100      Glucose 117 (*)     Alkaline Phosphatase 58      AST 72 (*)      (*)     Protein Total 6.3 (*)     Albumin 3.9      Bilirubin Total 0.5     ROUTINE UA WITH MICROSCOPIC REFLEX TO CULTURE - Abnormal    Color Urine Pink (*)     Appearance Urine Slightly Cloudy (*)     Glucose Urine Negative      Bilirubin Urine Negative      Ketones Urine Negative      Specific Gravity Urine 1.015      Blood Urine Large (*)     pH Urine 7.0      Protein Albumin Urine 100 (*)     Urobilinogen Urine Normal      Nitrite Urine Positive (*)     Leukocyte Esterase Urine Large (*)     Bacteria Urine Many (*)     Mucus Urine Present (*)     RBC Urine >182 (*)     WBC Urine 97 (*)     Squamous Epithelials Urine <1     LACTIC ACID WHOLE BLOOD - Abnormal    Lactic Acid 2.3 (*)    CBC WITH PLATELETS AND DIFFERENTIAL - Abnormal    WBC Count 11.1 (*)     RBC Count 5.19      Hemoglobin 14.8      Hematocrit 44.2      MCV 85      MCH 28.5      MCHC 33.5      RDW 13.3      Platelet Count 183      % Neutrophils 90      % Lymphocytes 3      % Monocytes 5      % Eosinophils 1      % Basophils 0      % Immature Granulocytes 1      NRBCs per 100 WBC 0      Absolute Neutrophils 10.0 (*)     Absolute Lymphocytes 0.4 (*)     Absolute Monocytes 0.5      Absolute Eosinophils 0.1      Absolute Basophils 0.0      Absolute Immature Granulocytes 0.1      Absolute NRBCs 0.0     INR   BLOOD CULTURE   BLOOD CULTURE   URINE CULTURE        CT Abdomen Pelvis w Contrast   Preliminary Result   IMPRESSION:    1.  Bilateral ureteral stents are in place. There is mild dilatation of bilateral intrarenal collecting systems and ureters, suggestive of partial occlusion of the stents.   2.  Urothelial enhancement of bilateral renal pelvises and ureters, suggestive of upper urinary tract infections or inflammation.   3.  No other acute abnormality identified in the  abdomen or pelvis.   4.  Several nonobstructing bilateral renal calculi, left kidney more numerous than right.          Treatments provided: 1 L bolus NS, 1000 mg tylenol, 1 gram Merrem, 0.5 mg dilaudid x 2, 4 mg Zofran  Family Comments: Wife at bedside  OBS brochure/video discussed/provided to patient:  N/A  ED Medications:   Medications   HYDROmorphone (PF) (DILAUDID) injection 0.5 mg (0.5 mg Intravenous $Given 9/29/23 0230)   meropenem (MERREM) 1 g vial to attach to  mL bag (1 g Intravenous $New Bag 9/29/23 0229)   sodium chloride 0.9% BOLUS 1,000 mL (1,000 mLs Intravenous $New Bag 9/29/23 0109)   acetaminophen (TYLENOL) tablet 1,000 mg (1,000 mg Oral $Given 9/29/23 0225)   ondansetron (ZOFRAN) injection 4 mg (4 mg Intravenous $Given 9/29/23 0109)   CT scan flush (61 mLs Intravenous $Given 9/29/23 0210)   iopamidol (ISOVUE-370) solution 500 mL (84 mLs Intravenous $Given 9/29/23 0210)       Drips infusing:  No  For the majority of the shift this patient was Green.   Interventions performed were N/A.    Sepsis treatment initiated: Yes    Per the ED Provider, Time Zero for severe sepsis or septic shock is:  0112    3 Hour Severe Sepsis Bundle Completion:  1. Initial Lactic Acid Result:   Recent Labs   Lab Test 09/29/23  0102 09/07/23  0337 11/14/15  1725   LACT 2.3* 1.2 0.8     2. Blood Cultures before Antibiotics: Yes  3. Broad Spectrum Antibiotics Administered:     Anti-infectives (From now, onward)      Start     Dose/Rate Route Frequency Ordered Stop    09/29/23 0145  meropenem (MERREM) 1 g vial to attach to  mL bag         1 g  over 30 Minutes Intravenous ONCE 09/29/23 0142            4. 1100 ml of IV fluids have been given so far      6 Hour Severe Sepsis Bundle Completion:    1. Repeat Lactic Acid Level: Not drawn  2. Patient currently on Vasopressors =  No    Cares/treatment/interventions/medications to be completed following ED care: See orders    ED Nurse Name: Leilani Batres RN  2:44  AM    RECEIVING UNIT ED HANDOFF REVIEW    Above ED Nurse Handoff Report was reviewed: Yes  Reviewed by: Roxie Varma RN on September 29, 2023 at 2:15 PM

## 2023-09-29 NOTE — CONSULTS
[facility] Consultation by [facility]    Jim Flores MRN# [id number]   Age: 48 year old YOB: 1975     Date of Admission:  9/29/2023    Reason for consult: Urosepsis, recent BL stents       Requesting PA/MD: Dr. John       Level of consult: Consult, follow and place orders           Assessment and Plan:   Assessment:   POD 4 Cystoscopy, bilateral ureteroscopy with holmium laser lithotripsy, bilateral ureteroscopy with stone basketing, bilateral retrograde pyelogram, bilateral ureteral stent exchange   Bilateral nephrolithiasis  UTI with urosepsis  Recurrent nephrolithiasis  Family history of nephrolithiasis  History of hyperparathyroidism status post parathyroidectomy  SLE       Plan:   -Okay for diet from urology perspective.  -Continue with IV antibiotics.  Follow cultures.  Transition to culture specific and oral as clinically appropriate and available.  -We did discuss that there is some mild bilateral hydronephrosis bilaterally.  Stents appear to be in good position.  This can occur even if stents are adequately in position.  Additionally they have not been in place long enough to appear to be failing.  -Continue with indwelling bilateral ureteral stents.  We discussed that we will still need to return the OR after treatment of infection for treatment of remaining stone burden.  -No Flomax at this time given patient's allergy and previous adverse effects.  -Pain and nausea management per primary service.  -Possible side effects with an indwelling ureteral stent such as urgency and frequency of urination, dysuria, hematuria, symptoms of urine reflux, and some achiness in the side. Indwelling ureteral stents need to be exchanged every three months or removed by three months.    -If lack of any clinical improvement on IV antibiotics, would consider repeat imaging to look for presence of abscess, although anticipate this will likely clinically not be necessary.    Charissa Guerra PA-C    [facility]  [phone]               Chief Complaint:   Fever     History is obtained from the patient and EMR.         History of Present Illness:   This patient is a 48 year old male who presented to the ER overnight due to fever.  He was initially seen by us on 09/07/2023 due to a 5 mm left UPJ stone as well as bilateral nephrolithiasis with greater than 10 stones in each kidney.  At that time, he underwent bilateral ureteral stent placement with Dr. Corley.  Patient then was seen in the OR on 09/25/2023, and he underwent bilateral ureteroscopy laser lithotripsy and basketing with bilateral ureteral stent exchange.  He is in need of returning to the OR to complete clearance of the kidney stones given the overall stone burden was very high.    Patient has a history of nephrolithiasis.  He has probably passed greater than 100 stones over the years.  He is also required bilateral ureteroscopy several times.  Stone composition has been calcium oxalate and calcium phosphate.  He has had metabolic evaluation several times and has undergone parathyroidectomy for elevated calcium.  First kidney stone formation was at age 24.  Family history of nephrolithiasis in his brothers and father.    Since his procedure on 09/25/2023, patient has been having dysuria.  This has worsened and now he is beginning to have hematuria.  Yesterday he began to have fevers and chills.  He also endorses that his abdominal pain is worsening, but is somewhat consistent with prior stents.  When he presented to the emergency department, lactate was 2.3.  This is improved to 1.8.  Urine culture and blood cultures are currently in process.  Urinalysis was obtained and is nitrate positive.  He was started on IV meropenem and multiple antibiotic allergies.  Tmax of 101.6.  This has improved to 99.3.  He also has been having tachycardia and tachypnea, both of which are improving.  Blood pressure is adequate.    CT imaging was obtained.  This  shows bilateral mild hydronephrosis with inflammation of the ureters and kidneys concerning for upper tract infection.  Patient does note that he has not had nausea and vomiting in a while.  Fevers and chills are somewhat improving.  Denies chest pain or shortness of breath.  Evidence of mild BLANKA with creatinine 1.33 EGFR of 72, baseline approximately 0.9-1.0.  WBC 11.1.          Past Medical History:     Past Medical History:   Diagnosis Date    Antiphospholipid antibody positive     Anxiety state, unspecified     Cardiomegaly 9/7/2023    Hypercalciuria     Nephrolithiasis     Pulmonary infarct (H) 9/1/2008    SLE with normal kidneys (H)           Past Surgical History:     Past Surgical History:   Procedure Laterality Date    BIOPSY  2014, 2021    Mole check - all good    COLONOSCOPY  11/19/2012    Procedure: COLONOSCOPY;  Colonoscopy;  Surgeon: Lupe Ratliff MD;  Location: [facility]    COLONOSCOPY Left 4/12/2022    Procedure: COLONOSCOPY, FLEXIBLE, WITH POLYP REMOVAL USING HOT SNARE;  Surgeon: Lupe Ratliff MD;  Location: [facility]    COMBINED CYSTOSCOPY, RETROGRADES, URETEROSCOPY, LASER HOLMIUM LITHOTRIPSY URETER(S), INSERT STENT Left 6/24/2015    Procedure: COMBINED CYSTOSCOPY, RETROGRADES, URETEROSCOPY, LASER HOLMIUM LITHOTRIPSY URETER(S), INSERT STENT;  Surgeon: Ramesh Johnson MD;  Location: [facility]    COMBINED CYSTOSCOPY, RETROGRADES, URETEROSCOPY, LASER HOLMIUM LITHOTRIPSY URETER(S), INSERT STENT Bilateral 9/25/2023    Procedure: Cystoscopy, bilateral ureteroscopy with holmium laser lithotripsy, bilateral ureteroscopy with stone basketing, bilateral retrograde pyelogram, bilateral ureteral stent exchange;  Surgeon: Sheng Barrientos MD;  Location: [facility]    CYSTOSCOPY, RETROGRADES, INSERT STENT URETER(S), COMBINED Bilateral 9/7/2023    Procedure: 1. Cystourethroscopy with bilateral ureteral stent placement 2. Bilateral retrograde pyelography with interpretation of intraoperative fluoroscopic  imaging;  Surgeon: Demetris Castillo MD;  Location: [facility] OR    GENITOURINARY SURGERY  2015 2018    kidney stone lithotripsy    HEAD & NECK SURGERY  2018    parathyroidectomy    vitaliynet stone remov      at [facility] March [facility] 2018    LASER HOLMIUM LITHOTRIPSY URETER(S), INSERT STENT, COMBINED Right 11/11/2015    Procedure: COMBINED CYSTOSCOPY, URETEROSCOPY, LASER HOLMIUM LITHOTRIPSY URETER(S), INSERT STENT;  Surgeon: Ramesh Johnson MD;  Location: [facility]    ZZHC REPAIR INCISIONAL HERNIA,REDUCIBLE  1991    Hernia Repair, Incisional, Unilateral             Social History:     Never smoker.  .         Family History:     Family History   Problem Relation Age of Onset    Cancer Mother         thyroid and uterine    Hyperlipidemia Mother     Other Cancer Mother         Ovarian    Thyroid Disease Mother     Diabetes Father     Anxiety Disorder Father     Prostate Cancer Maternal Grandfather     Arthritis Paternal Grandmother         Type not known    Cardiovascular Paternal Grandfather         CHF    Gastrointestinal Disease Daughter         Celiac disease    Colon Cancer No family hx of      Family history reviewed.             Allergies:     Allergies   Allergen Reactions    Ampicillin Hives    Bactrim Hives    Ceftazidime     Cefuroxime      Headache, stiff neck and joint pain    Cipro [Ciprofloxacin]      Stiff neck, joint pain, muscle aches    Flomax [Tamsulosin]      Swelling on  face    Ketorolac Tromethamine Itching    Shine Flavor Unknown     shine fruit gives lip swelling, hives    Penicillins Hives and Unknown    Poison Ivy Extract Unknown    Sulfamethoxazole-Trimethoprim Unknown    Tramadol Itching    Trimethoprim Hives          Medications:     Current Facility-Administered Medications   Medication    acetaminophen (TYLENOL) tablet 650 mg    Or    acetaminophen (TYLENOL) Suppository 650 mg    bisacodyl (DULCOLAX) suppository 10 mg    HYDROmorphone (DILAUDID) injection 0.2 mg    lactated ringers  infusion    melatonin tablet 3 mg    meropenem (MERREM) 1 g vial to attach to  mL bag    ondansetron (ZOFRAN ODT) ODT tab 4 mg    Or    ondansetron (ZOFRAN) injection 4 mg    oxyCODONE IR (ROXICODONE) half-tab 2.5 mg    Patient is already receiving anticoagulation with heparin, enoxaparin (LOVENOX), warfarin (COUMADIN)  or other anticoagulant medication    polyethylene glycol (MIRALAX) Packet 17 g    predniSONE (DELTASONE) tablet 12.5 mg    senna-docusate (SENOKOT-S/PERICOLACE) 8.6-50 MG per tablet 1 tablet    Or    senna-docusate (SENOKOT-S/PERICOLACE) 8.6-50 MG per tablet 2 tablet     Current Outpatient Medications   Medication Sig    acetaminophen (TYLENOL) 500 MG tablet Take 2 tablets (1,000 mg) by mouth every 6 hours as needed for mild pain    atorvastatin (LIPITOR) 10 MG tablet Take 1 tablet (10 mg) by mouth daily    docusate sodium (COLACE) 100 MG capsule Take 100 mg by mouth 2 times daily as needed for constipation    fluticasone (FLONASE) 50 MCG/ACT nasal spray Spray 1-2 sprays into both nostrils daily as needed for rhinitis or allergies    hydroxychloroquine (PLAQUENIL) 200 MG tablet Take 200 mg by mouth 2 times daily.    ketoconazole (NIZORAL) 2 % external shampoo Use topically 1-2 times a week    LORazepam (ATIVAN) 0.5 MG tablet Take 1 tablet (0.5 mg) by mouth every 6 hours as needed for anxiety    NONFORMULARY Take 1 capsule by mouth 2 times daily Methyl Guard Plus    Omega-3 Fatty Acids (FISH OIL) 1200 MG CPDR Take 1,200 Units by mouth 2 times daily    oxyBUTYnin (DITROPAN) 5 MG tablet Take 1 tablet (5 mg) by mouth 3 times daily as needed for bladder spasms    oxyCODONE (ROXICODONE) 5 MG tablet Take 1 tablet (5 mg) by mouth every 6 hours as needed for severe pain (IF pain not managed with non-pharmacological and non-opioid interventions)    predniSONE (DELTASONE) 5 MG tablet Take 12.5 mg by mouth daily    propranolol (INDERAL) 10 MG tablet Take 1 tablet (10 mg) by mouth 2 times daily as needed  (anxiety)    triamcinolone (KENALOG) 0.1 % external cream Apply topically to affected areas 1-2 times a week    venlafaxine (EFFEXOR-ER) 150 MG 24 hr tablet Take 150 mg by mouth 2 times daily    VITAMIN D, CHOLECALCIFEROL, PO Take 1,000 Units by mouth daily    warfarin ANTICOAGULANT (COUMADIN) 5 MG tablet Take by mouth daily , 7.5mg on Wednesdays & 10mg on all other days of the week             Review of Systems:   A comprehensive 10-point review of systems was performed and found to be negative except as described in the HPI.     /79   Pulse 105   Temp 99.3  F (37.4  C) (Oral)   Resp 20   SpO2 95%   PSYCH: NAD  EYES: EOMI  MOUTH: MMM  NECK: Supple, no notable adenopathy  RESP: Unlabored breathing  CARDIAC: No LE edema, mild tachycardia by radial pulse (102)  SKIN: Warm, no rashes  ABD: soft, diffusely tender  NEURO: AAO x3  URO: Urinating on own with aleah-peach colored urine.         Data:     Lab Results   Component Value Date    WBC 11.1 (H) 09/29/2023    HGB 14.8 09/29/2023    HCT 44.2 09/29/2023    MCV 85 09/29/2023     09/29/2023     Lab Results   Component Value Date    CR 1.33 (H) 09/29/2023    CR 1.13 09/07/2023     Recent Labs   Lab 09/29/23  0102   COLOR Pink*   APPEARANCE Slightly Cloudy*   URINEGLC Negative   URINEBILI Negative   URINEKETONE Negative   SG 1.015   URINEPH 7.0   PROTEIN 100*   NITRITE Positive*   LEUKEST Large*   RBCU >182*   WBCU 97*     Urine culture: in process.  Blood cultures: in process.    CT Abdomen Pelvis w Contrast    Result Date: 9/29/2023  EXAM: CT ABDOMEN AND PELVIS WITH CONTRAST LOCATION: [facility] DATE/TIME: 9/29/2023 2:24 AM CDT INDICATION: Recent bilateral ureteral stent placement. Lower abdominal pain, back pain and bilateral flank pain. COMPARISON: 09/07/2023 - CT chest, abdomen and pelvis. TECHNIQUE: CT scan of the abdomen and pelvis was performed following injection of IV contrast. Multiplanar reformats were obtained. Dose  reduction techniques were used. CONTRAST: 84 mL Isovue-370. FINDINGS: LOWER CHEST: Unremarkable. HEPATOBILIARY: Unremarkable. SPLEEN: Unremarkable. PANCREAS: Unremarkable. ADRENAL GLANDS: Unremarkable. KIDNEYS/BLADDER: Bilateral ureteral stents are in place with proximal pigtails in the renal pelvises and distal pigtails in the urinary bladder lumen. Mild dilatation of bilateral intrarenal collecting systems and ureters. Urothelial enhancement of bilateral renal pelvises and ureters. Mild perinephric haziness bilaterally. Symmetrical enhancement of both kidneys. At least three nonobstructing calculi in the right kidney, the largest measuring 0.4 cm. Several nonobstructing left renal calculi, the largest in the inferior pole and measuring 0.7 cm. No ureteral calculi. A few tiny calculi present in the dependent aspect of the urinary bladder lumen. BOWEL: Normal appendix. LYMPH NODES: Unremarkable. PELVIC ORGANS: No acute findings. MUSCULOSKELETAL: No acute findings. OTHER: None.     IMPRESSION: 1.  Bilateral ureteral stents are in place. There is mild dilatation of bilateral intrarenal collecting systems and ureters, suggestive of partial occlusion of the stents. 2.  Urothelial enhancement of bilateral renal pelvises and ureters, suggestive of upper urinary tract infections or inflammation. 3.  No other acute abnormality identified in the abdomen or pelvis. 4.  Several nonobstructing bilateral renal calculi, left kidney more numerous than right.    XR Surgery HAZEL L/T 5 Min Fluoro w Stills    Result Date: 9/25/2023  This exam was marked as non-reportable because it will not be read by a radiologist or a [place] non-radiologist provider.     XR Surgery HAZEL L/T 5 Min Fluoro w Stills    Result Date: 9/7/2023  This exam was marked as non-reportable because it will not be read by a radiologist or a [place] non-radiologist provider.     CT Chest (PE) Abdomen Pelvis w Contrast    Addendum Date: 9/7/2023    ADDENDUM: There  is a dictation error in the kidneys/bladder resection of the report which should read: KIDNEYS/BLADDER: There is a 6 mm stone at the right ureteropelvic junction with mild right hydronephrosis. Edema in the right perinephric space related to forniceal rupture. Multiple additional nonobstructing calculi are present in both kidneys, largest in the left kidney measuring 5 mm. Mild urothelial thickening and enhancement seen in the left renal pelvis and in the proximal left ureter. Urinary bladder has a smooth wall of uniform thickness. No bladder calculi. The original report IMPRESSION is unchanged. END OF ADDENDUM.    Result Date: 9/7/2023  EXAM: CT CHEST PE ABDOMEN PELVIS W CONTRAST LOCATION: [facility] DATE: 9/7/2023 INDICATION: chest pain and left flank pain COMPARISON: None. TECHNIQUE: CT chest pulmonary angiogram and routine CT abdomen pelvis with IV contrast. Arterial phase through the chest and venous phase through the abdomen and pelvis. Multiplanar reformats and MIP reconstructions were performed. Dose reduction techniques were used. CONTRAST: 60 mL Isovue 370 FINDINGS: ANGIOGRAM CHEST: Pulmonary arteries are normal caliber and negative for pulmonary emboli. Thoracic aorta is negative for dissection. No CT evidence of right heart strain. LUNGS AND PLEURA: Scattered mild atelectasis in the lung bases. No confluent consolidations. Trace left pleural effusion is present. No right pleural effusion. No pneumothorax. MEDIASTINUM/AXILLAE: No lymphadenopathy. Heart size is mildly enlarged with trace pericardial effusion. CORONARY ARTERY CALCIFICATION: Mild in the left coronary artery distribution.. HEPATOBILIARY: Normal. PANCREAS: Normal. SPLEEN: Normal. ADRENAL GLANDS: Normal. KIDNEYS/BLADDER: A 6 mm stone is seen in the right ureteropelvic junction with mild right hydronephrosis. [id number] stone seen in both kidneys, nonobstructing, largest in the left kidney measuring 5 mm. Mild urothelial  thickening and enhancement seen in the left renal pelvis. Lesion in the BOWEL: Normal, including the appendix. LYMPH NODES: Normal. VASCULATURE: Minimal aortoiliac atherosclerotic calcifications. No abdominal aortic aneurysm. PELVIC ORGANS: Normal. MUSCULOSKELETAL: Mild anterior wedge compression deformity of T8. No suspicious osseous lesions or acute fractures.     IMPRESSION: 1.  No acute findings in the chest including pulmonary embolism or pneumonia. 2.  Mild cardiomegaly with trace pericardial and trace left pleural effusions. 3.  6 mm stone in the right ureteropelvic junction with mild right hydronephrosis. 4.  Multiple bilateral nonobstructing renal stones measuring up to 5 mm on the left. 5.  Mild urothelial thickening and enhancement of the left renal pelvis and ureter. Recommend clinical correlation with urinalysis for upper urinary tract infection.

## 2023-09-29 NOTE — PLAN OF CARE
[facility]    ED Boarding Nurse Handoff Addendum Report:    Date/time: 9/29/2023, 3:55 AM    Activity Level: standby    Fall Risk: No    Active Infusions: LR @ 125ml/hr    Current Meds Due: PRN pain meds    Current care needs: Continuous IVF, Pain control    Oxygen requirements (liters/min and/or FiO2): N/A    Respiratory status: Room air    Vital signs (within last 30 minutes):    Vitals:    09/29/23 0139 09/29/23 0232 09/29/23 0300 09/29/23 0322   BP: 122/73 (!) 151/94 (!) 152/97    Pulse: 107 (!) 122 (!) 126    Resp:       Temp:    (!) 101.6  F (38.7  C)   TempSrc:    Oral   SpO2: 96% 99% 94%        Focused assessment within last 30 minutes:    A&Ox4, Tachycardic, on RA. PRN IV dilaudid given for pain. Denied nausea at this time.     ED Boarding Nurse name: Joanie Smith RN

## 2023-09-29 NOTE — PROGRESS NOTES
Admitted earlier today  Seen and examined.  Chart reviewed.  Family updated this patient's wife and daughter present at bedside  Appears to be in better spirits as no further nausea or vomiting this morning  Still showing stable hemodynamics.  Currently afebrile.  Not requiring oxygen support.  No operative intervention approach for now from urology perspective  Appreciate prompt input from urology service  Remain on IV antibiotics currently on broad-spectrum meropenem.  Has numerous listed allergies.  Currently being treated for underlying complicated UTI with sepsis  Pending cultures  Monitor infectious markers  Remain on anticoagulation with warfarin with prior history of antiphospholipid antibody chronically anticoagulated  We will resume anticoagulation as no plans for operative intervention

## 2023-09-29 NOTE — ED TRIAGE NOTES
Pt to ER with c/o fever after lithotripsy at [facility], pt has bilat stents placed now fever

## 2023-09-29 NOTE — ED PROVIDER NOTES
History     Chief Complaint:  Fever       HPI   Jim Flores is a 48 year old anticoagulated male with a history of kidney stones and pulmonary embolism who presents with a fever. He recently had a lithotripsy with bilateral stents placed on 9/25 and was discharged. He felt some dysuria since discharge that has been gradually worsening, and now has noticed some hematuria. Today, he began to have chills and a fever. He mentions lower back and abdominal pain that is worsened with urination. He has been having vomiting and diarrhea that started upon arrival to the ED. He denies any black or bloody stool.      Independent Historian:   None - Patient Only    Review of External Notes:   Outside clinic and hospital notes reviewed.  Recent bilateral ureteral placement noted.    Medications:    Atorvastatin  Fluticasone  Lorazepam  Propranolol  Tamsulosin  Venlafaxine  Hydrochlorothiazide  Hydrochloroquine  Prednisone  Warfarin      Past Medical History:    Antiphospholipid antibody positive  Anxiety  Hypercalciuria  Nephrolithiasis  Pulmonary infarct  Pulmonary embolism  Lupus  GERD  Hypercoagulation syndrome  Hyperparathyroidism      Past Surgical History:    Skin biopsy  Cystoscopy lithotripsy insert stent, bilateral x2  Incisional hernia repair  Parathyroidectomy     Physical Exam   Patient Vitals for the past 24 hrs:   BP Temp Temp src Pulse Resp SpO2   09/29/23 0322 -- (!) 101.6  F (38.7  C) Oral -- -- --   09/29/23 0300 (!) 152/97 -- -- (!) 126 -- 94 %   09/29/23 0232 (!) 151/94 -- -- (!) 122 -- 99 %   09/29/23 0139 122/73 -- -- 107 -- 96 %   09/29/23 0040 (!) 170/104 (!) 101.2  F (38.4  C) Temporal (!) 127 20 98 %        Physical Exam  General: Uncomfortable appearing adult male, laying on the stretcher  Eyes: PERRL, Conjunctive within normal limits.  No scleral icterus.  ENT: Moist mucous membranes, oropharynx clear.   CV: Normal S1S2, no murmur, rub or gallop.  Tachycardic, regular  Resp: Clear to auscultation  bilaterally, no wheezes, rales or rhonchi. Normal respiratory effort.  GI: Abdomen is soft and nondistended.  Lower abdominal tenderness to palpation diffusely.  VA tenderness to percussion bilaterally.  No palpable masses. No rebound or guarding.  MSK: No edema. Nontender. Normal active range of motion.  Skin: Warm and dry. No rashes or lesions or ecchymoses on visible skin.  Neuro: Alert and oriented. Responds appropriately to all questions and commands. No focal findings appreciated. Normal muscle tone.  Psych: Normal mood and affect. Pleasant.     Emergency Department Course     ECG results from 09/29/23   EKG 12 lead     Value    Systolic Blood Pressure     Diastolic Blood Pressure     Ventricular Rate 108    Atrial Rate 108    IA Interval 170    QRS Duration 106        QTc 474    P Axis 9    R AXIS -6    T Axis 29    Interpretation ECG      Sinus tachycardia  Otherwise normal ECG  When compared with ECG of 07-SEP-2023 04:11,  No significant change was found         Imaging:  CT Abdomen Pelvis w Contrast   Preliminary Result   IMPRESSION:    1.  Bilateral ureteral stents are in place. There is mild dilatation of bilateral intrarenal collecting systems and ureters, suggestive of partial occlusion of the stents.   2.  Urothelial enhancement of bilateral renal pelvises and ureters, suggestive of upper urinary tract infections or inflammation.   3.  No other acute abnormality identified in the abdomen or pelvis.   4.  Several nonobstructing bilateral renal calculi, left kidney more numerous than right.         Report per radiology    Laboratory:  Labs Ordered and Resulted from Time of ED Arrival to Time of ED Departure   COMPREHENSIVE METABOLIC PANEL - Abnormal       Result Value    Sodium 136      Potassium 3.8      Carbon Dioxide (CO2) 25      Anion Gap 11      Urea Nitrogen 18.6      Creatinine 1.33 (*)     GFR Estimate 65      Calcium 9.4      Chloride 100      Glucose 117 (*)     Alkaline Phosphatase 58       AST 72 (*)      (*)     Protein Total 6.3 (*)     Albumin 3.9      Bilirubin Total 0.5     ROUTINE UA WITH MICROSCOPIC REFLEX TO CULTURE - Abnormal    Color Urine Pink (*)     Appearance Urine Slightly Cloudy (*)     Glucose Urine Negative      Bilirubin Urine Negative      Ketones Urine Negative      Specific Gravity Urine 1.015      Blood Urine Large (*)     pH Urine 7.0      Protein Albumin Urine 100 (*)     Urobilinogen Urine Normal      Nitrite Urine Positive (*)     Leukocyte Esterase Urine Large (*)     Bacteria Urine Many (*)     Mucus Urine Present (*)     RBC Urine >182 (*)     WBC Urine 97 (*)     Squamous Epithelials Urine <1     LACTIC ACID WHOLE BLOOD - Abnormal    Lactic Acid 2.3 (*)    CBC WITH PLATELETS AND DIFFERENTIAL - Abnormal    WBC Count 11.1 (*)     RBC Count 5.19      Hemoglobin 14.8      Hematocrit 44.2      MCV 85      MCH 28.5      MCHC 33.5      RDW 13.3      Platelet Count 183      % Neutrophils 90      % Lymphocytes 3      % Monocytes 5      % Eosinophils 1      % Basophils 0      % Immature Granulocytes 1      NRBCs per 100 WBC 0      Absolute Neutrophils 10.0 (*)     Absolute Lymphocytes 0.4 (*)     Absolute Monocytes 0.5      Absolute Eosinophils 0.1      Absolute Basophils 0.0      Absolute Immature Granulocytes 0.1      Absolute NRBCs 0.0     INR   FACTOR 10 CHROMOGENIC   LACTIC ACID WHOLE BLOOD   BLOOD CULTURE   BLOOD CULTURE   URINE CULTURE        Emergency Department Course & Assessments:    Interventions:  Medications   meropenem (MERREM) 1 g vial to attach to  mL bag (has no administration in time range)   sodium chloride 0.9% BOLUS 1,000 mL (0 mLs Intravenous Stopped 9/29/23 0323)   acetaminophen (TYLENOL) tablet 1,000 mg (1,000 mg Oral $Given 9/29/23 0225)   ondansetron (ZOFRAN) injection 4 mg (4 mg Intravenous $Given 9/29/23 0109)   meropenem (MERREM) 1 g vial to attach to  mL bag (0 g Intravenous Stopped 9/29/23 0259)   CT scan flush (61 mLs  Intravenous $Given 9/29/23 0210)   iopamidol (ISOVUE-370) solution 500 mL (84 mLs Intravenous $Given 9/29/23 0210)        Assessments:  0130 I obtained history and examined the patient, as noted above.  0248 I rechecked and updated the patient.  He notes he is feeling more comfortable.  0330 I rechecked and updated the patient. He is feeling comfortable. He is sitting upright, improved in appearance, taking ice chips.     Independent Interpretation (X-rays, CTs, rhythm strip):  None    Consultations/Discussion of Management or Tests:  0255 I spoke with Dr. Paniagua from urology about the patient's presentation, findings, and plan of care.  0315 I spoke with Dr. John from the hospitalist service regarding the patient's presentation, findings here in the ED, and plan of care.     Social Determinants of Health affecting care:   None    Disposition:  The patient was admitted to the hospital under the care of Dr. John.     Impression & Plan    CMS Diagnoses: The patient has signs of Severe Sepsis        If one the following conditions is present, a 30 mL/kg bolus is recommended as part of the 6 hour bundle (IBW can be used for BMI >30, or document refusal/contraindication):      1.   Initial hypotension  defined as 2 bps < 90 or map < 65 in the 6hrs before or 3hrs after time zero.     2.  Lactate >4.      The patient has signs of Severe Sepsis as evidenced by:    1. 2 SIRS criteria, AND  2. Suspected infection, AND   3. Organ dysfunction: Lactic Acidosis with value >2.0    Time severe sepsis diagnosis confirmed: 0102 09/29/23 as this was the time when Lactate resulted, and the level was > 2.0    3 Hour Severe Sepsis Bundle Completion:    1. Initial Lactic Acid Result:   Recent Labs   Lab Test 09/29/23  0102 09/07/23  0337 11/14/15  1725   LACT 2.3* 1.2 0.8     2. Blood Cultures before Antibiotics: Yes  3. Broad Spectrum Antibiotics Administered:  yes       Anti-infectives (From admission through now)      Start      Dose/Rate Route Frequency Ordered Stop    09/29/23 0145  meropenem (MERREM) 1 g vial to attach to  mL bag         1 g  over 30 Minutes Intravenous ONCE 09/29/23 0142 09/29/23 0259            4. Is initial hypotension present?     No (IV fluid bolus NOT required). IV Fluid volume administered: 1L          Medical Decision Making:  Jim Flores is a 48-year-old male with a history of nephrolithiasis, SLE, and coagulopathy requiring chronic warfarin who presents emergency department with concerns for dysuria and fever less than 1 week status post by ureteral stent placement.  Today's evaluation is consistent with severe sepsis likely secondary to UTI.  On arrival he was tachycardic and febrile.  He was not hypotensive.  He meets criteria for severe sepsis.  He was treated with broad-spectrum antibiotic as dictated by his illness as well as his multiple allergies.  CT scan was performed due to concerns for sepsis and increasing abdominal pain.  Findings were discussed with Dr. Paniagua of urology who felt the patient would benefit from ongoing sepsis therapy but did not require any emergent surgical evaluation.  I discussed this plan with the patient.  He is agreeable to plan.  He overall was improved and continued to not have any signs of septic shock.  He will be admitted to medical surgical bed in stable condition.    Diagnosis:    ICD-10-CM    1. Severe sepsis (H)  A41.9     R65.20       2. Complicated UTI (urinary tract infection)  N39.0              Scribe Disclosure:  Rishabh CORNEJO, am serving as a scribe at 2:55 AM on 9/29/2023 to document services personally performed by Neva Valero MD based on my observations and the provider's statements to me.   9/29/2023   Neva Valero MD Jonkman, Tracy Dianne, MD  09/29/23 0333

## 2023-09-29 NOTE — PROGRESS NOTES
[facility]    ED Boarding Nurse Handoff Addendum Report:    Date/time: 9/29/2023, 2:25 PM    Activity Level: standby    Fall Risk: Yes:  nonskid shoes/slippers when out of bed, patient and family education, and assistive device/personal items within reach    Active Infusions: LR at 125    Current Meds Due: None    Current care needs: ABX treatment, pain management     Oxygen requirements (liters/min and/or FiO2): N/A    Respiratory status: Room air    Vital signs (within last 30 minutes):    Vitals:    09/29/23 0559 09/29/23 0804 09/29/23 1015 09/29/23 1030   BP:  118/79     Pulse: 118 (!) 122 105 105   Resp: 18 18 21 20   Temp: 99.7  F (37.6  C) 99.3  F (37.4  C)     TempSrc: Oral Oral     SpO2: 96% 95% 97% 95%       Focused assessment within last 30 minutes:    Pt is A/O x4, up SBA. POD4 from stent placements and lithotripsy. VSS, tachycardic this AM but now running in the 90's. Tmax 99.3. Urology consulted, no surgical intervention at this time. Tylenol given for headache this AM. Pt states minor flank pain after urination, endorses dysuria and frequency. On Meropenum for UTI/urosepsis. UC pending. Plan to transfer to room 543.     ED Boarding Nurse name: Marbella Ray RN

## 2023-09-29 NOTE — PHARMACY-ANTICOAGULATION SERVICE
Clinical Pharmacy - Warfarin Dosing Consult     Pharmacy has been consulted to manage this patient s warfarin therapy.  Indication: Other - specify in comments (h/o PE, antiphospholipid syndrome)  Therapy Goal: Chr Factor 10: 20-40%  Warfarin Prior to Admission: Yes  Warfarin PTA Regimen: 7.5 mg every Wednesday, 10 mg ROW    INR   Date Value Ref Range Status   09/29/2023 1.43 (H) 0.85 - 1.15 Final   09/08/2023 1.35 (H) 0.85 - 1.15 Final     Factor 10 Chromogenic   Date Value Ref Range Status   09/29/2023 45 (L) 70 - 130 % Final       Recommend warfarin 10 mg today.  Pharmacy will monitor Jim Flores daily and order warfarin doses to achieve specified goal.      Please contact pharmacy as soon as possible if the warfarin needs to be held for a procedure or if the warfarin goals change.

## 2023-09-30 LAB
ANION GAP SERPL CALCULATED.3IONS-SCNC: 6 MMOL/L (ref 7–15)
BUN SERPL-MCNC: 14.1 MG/DL (ref 6–20)
CALCIUM SERPL-MCNC: 8.7 MG/DL (ref 8.6–10)
CHLORIDE SERPL-SCNC: 106 MMOL/L (ref 98–107)
CREAT SERPL-MCNC: 1.53 MG/DL (ref 0.67–1.17)
DEPRECATED HCO3 PLAS-SCNC: 27 MMOL/L (ref 22–29)
EGFRCR SERPLBLD CKD-EPI 2021: 55 ML/MIN/1.73M2
ENTEROCOCCUS FAECALIS: NOT DETECTED
ENTEROCOCCUS FAECIUM: NOT DETECTED
ERYTHROCYTE [DISTWIDTH] IN BLOOD BY AUTOMATED COUNT: 14 % (ref 10–15)
GLUCOSE SERPL-MCNC: 95 MG/DL (ref 70–99)
HCT VFR BLD AUTO: 39.4 % (ref 40–53)
HGB BLD-MCNC: 12.9 G/DL (ref 13.3–17.7)
INR PPP: 2.52 (ref 0.85–1.15)
LISTERIA SPECIES (DETECTED/NOT DETECTED): NOT DETECTED
MCH RBC QN AUTO: 28.7 PG (ref 26.5–33)
MCHC RBC AUTO-ENTMCNC: 32.7 G/DL (ref 31.5–36.5)
MCV RBC AUTO: 88 FL (ref 78–100)
PLATELET # BLD AUTO: 135 10E3/UL (ref 150–450)
POTASSIUM SERPL-SCNC: 4.1 MMOL/L (ref 3.4–5.3)
RBC # BLD AUTO: 4.5 10E6/UL (ref 4.4–5.9)
SODIUM SERPL-SCNC: 139 MMOL/L (ref 135–145)
STAPHYLOCOCCUS AUREUS: NOT DETECTED
STAPHYLOCOCCUS EPIDERMIDIS: DETECTED
STAPHYLOCOCCUS LUGDUNENSIS: NOT DETECTED
STREPTOCOCCUS AGALACTIAE: NOT DETECTED
STREPTOCOCCUS ANGINOSUS GROUP: NOT DETECTED
STREPTOCOCCUS PNEUMONIAE: NOT DETECTED
STREPTOCOCCUS PYOGENES: NOT DETECTED
STREPTOCOCCUS SPECIES: NOT DETECTED
WBC # BLD AUTO: 8.9 10E3/UL (ref 4–11)

## 2023-09-30 PROCEDURE — 36415 COLL VENOUS BLD VENIPUNCTURE: CPT | Performed by: INTERNAL MEDICINE

## 2023-09-30 PROCEDURE — 80048 BASIC METABOLIC PNL TOTAL CA: CPT | Performed by: HOSPITALIST

## 2023-09-30 PROCEDURE — 250N000011 HC RX IP 250 OP 636: Performed by: HOSPITALIST

## 2023-09-30 PROCEDURE — 85610 PROTHROMBIN TIME: CPT | Performed by: INTERNAL MEDICINE

## 2023-09-30 PROCEDURE — 99231 SBSQ HOSP IP/OBS SF/LOW 25: CPT | Performed by: STUDENT IN AN ORGANIZED HEALTH CARE EDUCATION/TRAINING PROGRAM

## 2023-09-30 PROCEDURE — 99233 SBSQ HOSP IP/OBS HIGH 50: CPT | Performed by: INTERNAL MEDICINE

## 2023-09-30 PROCEDURE — 99254 IP/OBS CNSLTJ NEW/EST MOD 60: CPT | Performed by: INTERNAL MEDICINE

## 2023-09-30 PROCEDURE — 250N000013 HC RX MED GY IP 250 OP 250 PS 637: Performed by: HOSPITALIST

## 2023-09-30 PROCEDURE — 258N000003 HC RX IP 258 OP 636: Performed by: HOSPITALIST

## 2023-09-30 PROCEDURE — 85027 COMPLETE CBC AUTOMATED: CPT | Performed by: HOSPITALIST

## 2023-09-30 PROCEDURE — 250N000013 HC RX MED GY IP 250 OP 250 PS 637: Performed by: INTERNAL MEDICINE

## 2023-09-30 PROCEDURE — 120N000001 HC R&B MED SURG/OB

## 2023-09-30 PROCEDURE — 250N000012 HC RX MED GY IP 250 OP 636 PS 637: Performed by: HOSPITALIST

## 2023-09-30 RX ORDER — NALOXONE HYDROCHLORIDE 0.4 MG/ML
0.4 INJECTION, SOLUTION INTRAMUSCULAR; INTRAVENOUS; SUBCUTANEOUS
Status: DISCONTINUED | OUTPATIENT
Start: 2023-09-30 | End: 2023-10-02 | Stop reason: HOSPADM

## 2023-09-30 RX ORDER — NALOXONE HYDROCHLORIDE 0.4 MG/ML
0.2 INJECTION, SOLUTION INTRAMUSCULAR; INTRAVENOUS; SUBCUTANEOUS
Status: DISCONTINUED | OUTPATIENT
Start: 2023-09-30 | End: 2023-10-02 | Stop reason: HOSPADM

## 2023-09-30 RX ADMIN — WARFARIN SODIUM 0.5 MG: 1 TABLET ORAL at 17:38

## 2023-09-30 RX ADMIN — HYDROXYCHLOROQUINE SULFATE 200 MG: 200 TABLET, FILM COATED ORAL at 09:29

## 2023-09-30 RX ADMIN — MEROPENEM 1 G: 1 INJECTION, POWDER, FOR SOLUTION INTRAVENOUS at 09:33

## 2023-09-30 RX ADMIN — PREDNISONE 12.5 MG: 2.5 TABLET ORAL at 09:29

## 2023-09-30 RX ADMIN — SODIUM CHLORIDE, POTASSIUM CHLORIDE, SODIUM LACTATE AND CALCIUM CHLORIDE: 600; 310; 30; 20 INJECTION, SOLUTION INTRAVENOUS at 08:40

## 2023-09-30 RX ADMIN — SODIUM CHLORIDE, POTASSIUM CHLORIDE, SODIUM LACTATE AND CALCIUM CHLORIDE: 600; 310; 30; 20 INJECTION, SOLUTION INTRAVENOUS at 21:50

## 2023-09-30 RX ADMIN — VENLAFAXINE HYDROCHLORIDE 150 MG: 150 CAPSULE, EXTENDED RELEASE ORAL at 09:29

## 2023-09-30 RX ADMIN — MEROPENEM 1 G: 1 INJECTION, POWDER, FOR SOLUTION INTRAVENOUS at 01:23

## 2023-09-30 RX ADMIN — ACETAMINOPHEN 650 MG: 325 TABLET, FILM COATED ORAL at 09:31

## 2023-09-30 RX ADMIN — MEROPENEM 1 G: 1 INJECTION, POWDER, FOR SOLUTION INTRAVENOUS at 17:39

## 2023-09-30 RX ADMIN — VENLAFAXINE HYDROCHLORIDE 150 MG: 150 CAPSULE, EXTENDED RELEASE ORAL at 20:42

## 2023-09-30 RX ADMIN — VANCOMYCIN HYDROCHLORIDE 1500 MG: 5 INJECTION, POWDER, LYOPHILIZED, FOR SOLUTION INTRAVENOUS at 02:02

## 2023-09-30 ASSESSMENT — ACTIVITIES OF DAILY LIVING (ADL)
ADLS_ACUITY_SCORE: 39

## 2023-09-30 NOTE — PHARMACY-VANCOMYCIN DOSING SERVICE
Pharmacy Vancomycin Initial Note  Date of Service 2023  Patient's  1975  48 year old, male    Indication: Bacteremia    Current estimated CrCl = Estimated Creatinine Clearance: 70.8 mL/min (A) (based on SCr of 1.33 mg/dL (H)).    Creatinine for last 3 days  2023:  1:02 AM Creatinine 1.33 mg/dL    Recent Vancomycin Level(s) for last 3 days  No results found for requested labs within last 3 days.      Vancomycin IV Administrations (past 72 hours)        No vancomycin orders with administrations in past 72 hours.                    Nephrotoxins and other renal medications (From now, onward)      Start     Dose/Rate Route Frequency Ordered Stop    23 0130  vancomycin (VANCOCIN) 1,500 mg in 0.9% NaCl 250 mL intermittent infusion         1,500 mg  over 90 Minutes Intravenous EVERY 24 HOURS 23 0116              Contrast Orders - past 72 hours (72h ago, onward)      Start     Dose/Rate Route Frequency Stop    23 0205  iopamidol (ISOVUE-370) solution 500 mL         500 mL Intravenous ONCE 23 0210            [employer] Prediction of Planned Initial Vancomycin Regimen  Loading dose: N/A  Regimen: 1500 mg IV every 24 hours.  Start time: 01:17 on 2023  Exposure target: AUC24 (range)400-600 mg/L.hr   AUC24,ss: 474 mg/L.hr  Probability of AUC24 > 400: 70 %  Ctrough,ss: 13.2 mg/L  Probability of Ctrough,ss > 20: 14 %  Probability of nephrotoxicity (Lodise LATRICE ): 8 %          Plan:  Start vancomycin  1500 mg IV q24h.   Vancomycin monitoring method: AUC  Vancomycin therapeutic monitoring goal: 400-600 mg*h/L  Pharmacy will check vancomycin levels as appropriate in 1-3 Days.    Serum creatinine levels will be ordered daily for the first week of therapy and at least twice weekly for subsequent weeks.      Bri Levin RPH

## 2023-09-30 NOTE — PLAN OF CARE
End of Shift Summary  For vital signs and complete assessments, please see documentation flowsheets.     Pertinent assessments: A/O. VSS. Tylenol given for HA and mild flank pain. Up IND. Karla. Gluten free diet. Denies nausea and sob. Tele monitoring.     Major Shift Events admitted to the floor.     Treatment Plan: Merrem. Pain control. Symptom management.

## 2023-09-30 NOTE — PROGRESS NOTES
[facility] Post-Op / Progress Note         Assessment and Plan:    Assessment:   Post-operative day #5  bilateral ureteroscopy with laser lithotripsy/stone basketing/stent exchange    now admitted with concern for sepsis     Urine growing >100k cfu/ml Staph epi. I do not think this is contaminant, in this setting this is almost certainly the causative pathogen of his sepsis         Plan:   Continue abx, note plan for ID involvement. From urology standpoint, definitely want to continue antibiotics through next surgery and postop for a few days as well, for at least a 14 day treatment course     Sheng Barrientos MD   [facility]  [phone] clinic phone             Interval History:   Feeling somewhat better today          Significant Problems:      Past Medical History:   Diagnosis Date    Antiphospholipid antibody positive     Anxiety state, unspecified     Cardiomegaly 9/7/2023    Hypercalciuria     Nephrolithiasis     Pulmonary infarct (H) 9/1/2008    SLE with normal kidneys (H)              Review of Systems:    The Review of Systems is negative other than noted in the HPI          Medications:   All medications related to the patient's surgery have been reviewed  Current Facility-Administered Medications   Medication    acetaminophen (TYLENOL) tablet 650 mg    Or    acetaminophen (TYLENOL) Suppository 650 mg    acetaminophen (TYLENOL) tablet 1,000 mg    [Held by provider] atorvastatin (LIPITOR) tablet 10 mg    bisacodyl (DULCOLAX) suppository 10 mg    docusate sodium (COLACE) capsule 100 mg    fluticasone (FLONASE) 50 MCG/ACT spray 1-2 spray    HYDROmorphone (DILAUDID) injection 0.2 mg    [Held by provider] hydroxychloroquine (PLAQUENIL) tablet 200 mg    lactated ringers infusion    LORazepam (ATIVAN) tablet 0.5 mg    melatonin tablet 3 mg    meropenem (MERREM) 1 g vial to attach to  mL bag    naloxone (NARCAN) injection 0.2 mg    Or    naloxone (NARCAN) injection 0.4 mg    Or     naloxone (NARCAN) injection 0.2 mg    Or    naloxone (NARCAN) injection 0.4 mg    ondansetron (ZOFRAN ODT) ODT tab 4 mg    Or    ondansetron (ZOFRAN) injection 4 mg    oxyBUTYnin (DITROPAN) tablet 5 mg    oxyCODONE IR (ROXICODONE) half-tab 2.5 mg    Patient is already receiving anticoagulation with heparin, enoxaparin (LOVENOX), warfarin (COUMADIN)  or other anticoagulant medication    polyethylene glycol (MIRALAX) Packet 17 g    predniSONE (DELTASONE) tablet 12.5 mg    propranolol (INDERAL) tablet 10 mg    senna-docusate (SENOKOT-S/PERICOLACE) 8.6-50 MG per tablet 1 tablet    Or    senna-docusate (SENOKOT-S/PERICOLACE) 8.6-50 MG per tablet 2 tablet    venlafaxine (EFFEXOR XR) 24 hr capsule 150 mg    warfarin ANTICOAGULANT (COUMADIN) half-tab 0.5 mg    Warfarin Dose Required Daily - Pharmacist Managed             Physical Exam:   All vitals stable  Temp: 100  F (37.8  C) Temp src: Oral BP: 128/86 Pulse: 91   Resp: 20 SpO2: 97 % O2 Device: None (Room air)    NAD  Nonlabored breathing          Data:   All laboratory data related to this surgery reviewed  Results for orders placed or performed during the hospital encounter of 09/29/23 (from the past 24 hour(s))   Basic metabolic panel   Result Value Ref Range    Sodium 139 135 - 145 mmol/L    Potassium 4.1 3.4 - 5.3 mmol/L    Chloride 106 98 - 107 mmol/L    Carbon Dioxide (CO2) 27 22 - 29 mmol/L    Anion Gap 6 (L) 7 - 15 mmol/L    Urea Nitrogen 14.1 6.0 - 20.0 mg/dL    Creatinine 1.53 (H) 0.67 - 1.17 mg/dL    GFR Estimate 55 (L) >60 mL/min/1.73m2    Calcium 8.7 8.6 - 10.0 mg/dL    Glucose 95 70 - 99 mg/dL   CBC with platelets   Result Value Ref Range    WBC Count 8.9 4.0 - 11.0 10e3/uL    RBC Count 4.50 4.40 - 5.90 10e6/uL    Hemoglobin 12.9 (L) 13.3 - 17.7 g/dL    Hematocrit 39.4 (L) 40.0 - 53.0 %    MCV 88 78 - 100 fL    MCH 28.7 26.5 - 33.0 pg    MCHC 32.7 31.5 - 36.5 g/dL    RDW 14.0 10.0 - 15.0 %    Platelet Count 135 (L) 150 - 450 10e3/uL   INR   Result Value Ref  Range    INR 2.52 (H) 0.85 - 1.15     All imaging studies related to this surgery reviewed    Sheng Barrientos MD

## 2023-09-30 NOTE — PLAN OF CARE
Goal Outcome Evaluation:      Plan of Care Reviewed With: patient      To Do:  End of Shift Summary  For vital signs and complete assessments, please see documentation flowsheets.     Pertinent assessments: Pt A/O. VSS. T max 100.2. Denies nausea and SOB. Tylenol given for mild flank pain. Up ind. Tele- SR  Major Shift Events: Paged MD- BC + gram positive cocci in cluster, started Vanco IV.  Treatment Plan: Merrem, Vanc IV and symptom management.  Bedside Nurse: Colleen Rodriguez RN

## 2023-09-30 NOTE — PROGRESS NOTES
[facility]    Medicine Progress Note - Hospitalist Service    Date of Admission:  9/29/2023    Assessment & Plan      Jim Flores is a 48 year old male with a history of SLE, antiphospholipid antibody on chronic anticoagulation, anxiety, hypertension, nephrolithiasis, PE, GERD and hyperparathyroidism who presents with fever and chills in the context of recent lithotripsy and bilateral ureteral stents with work-up suggestive of a complicated UTI.     Sepsis secondary to complicated urinary tract infection:   -Currently showing GPC in 1 out of 2 blood cultures   -Earlier urine culture grew Staphylococcus epidermidis   -Unsure if this is truly just a contaminant or a true infection in the setting of patient's chronic immuno compromised state and recent instrumentation with urology.  -Patient is currently receiving meropenem as started during admission with numerous listed allergies  -He was also provided with a dose of vancomycin systemic IV route last night given this GPC 1 out of 2 bacteremia  -I requested formal ID service input and evaluation in the setting of this a complicated UTI, unsure if this urine culture growing Staph epidermidis with accompanying bacteremia 1 out of 2 bottles of GPC is a true infection.  -We will hold off further systemic vancomycin for now in the setting of a bump on his creatinine and until further evaluation from ID service.  Nevertheless patient is currently not sick appearing.  Afebrile.  No significant leukocytosis with stable hemodynamics.  -this is in the context of a lithotripsy with bilateral ureteral stents performed on Monday of this week.  Sepsis as evidenced by fever, tachycardia, leukocytosis and lactic acidosis.   -Appreciate prompt input from urology service.     # Acute kidney injury: Creatinine is slightly elevated from baseline currently at 1.33 with a baseline of 1.1.  Continue fluid resuscitation and sepsis management.    #Antiphospholipid  antibody: He is chronically anticoagulated on warfarin which is monitored by his chromogenic factor X level.  I will send a chromogenic factor X level at this time.    -Resume patient's home regimen of anticoagulation    #SLE and Sjogren's: He recently experienced a flare and was on higher dose prednisone, currently weaning down and on 12.5 mg daily (this will be ordered for the morning).  He baseline dose is 7.5 mg daily.  We will have a low threshold for stress dose steroids.  We will hold his prior to admission hydroxychloroquine for now given active infection.    # Blood pressure medication usage: Has a prescription for propranolol that he was prescribed for anxiety but has not taken this for quite some time.  Was also previously on hydrochlorothiazide for his nephrolithiasis but again has not taken this in quite some time.    # Hyperlipidemia: His liver tests are slightly elevated, hold atorvastatin for now.  -Recheck CMP  -Transaminitis: Could be due to sepsis.  Follow periodically.  He denies any right upper quadrant pain.    # Anxiety: Continue prior to admission venlafaxine.       Diet: Gluten Free Diet    DVT Prophylaxis: Warfarin  Warren Catheter: Not present  Lines: None     Cardiac Monitoring: ACTIVE order. Indication: Electrolyte Imbalance (24 hours)- Magnesium <1.3 mg/ml; Potassium < =2.8 or > 5.5 mg/ml  Code Status: Full Code      Clinically Significant Risk Factors                 # Acute Kidney Injury, unspecified: based on a >150% or 0.3 mg/dL increase in last creatinine compared to past 90 day average, will monitor renal function                    Disposition Plan     Expected Discharge Date: 10/01/2023                  Edwin Davis MD, MD  Hospitalist Service  [facility]  Securely message with [employer] (more info)  Text page via [facility] Paging/Directory   ______________________________________________________________________    Interval History   Continuing medicine  service care today.  Seen and examined.  Chart reviewed.  Case discussed with nursing service.  I met Jim this morning while he is laying comfortably in bed.  He appears to be in good spirits as he is feeling a little better.  Able to get her some couple of hours of decent sleep overnight.  Denies any nausea, vomiting.  Voiding freely.  Passing flatus.  No further diarrhea.  No bleeding tendencies.  No significant mental status changes overnight.  Not requiring any oxygen support.  Denies any flank pain or abdominal pain.  Still having some dysuria.  No hematuria.    Physical Exam   Vital Signs: Temp: 100  F (37.8  C) Temp src: Oral BP: 128/86 Pulse: 91   Resp: 20 SpO2: 97 % O2 Device: None (Room air)    Weight: 166 lbs 0 oz    HEENT; Atraumatic, normocephalic, pinkish conjuctiva, pupils bilateral reactive   Skin: warm and moist, no rashes  Cachectic  Lungs: equal chest expansion, clear to auscultation, no wheezes, no stridor, no crackles,   Heart: normal rate, normal rhythm, no rubs or gallops.   Abdomen: normal bowel sounds, no tenderness, no peritoneal signs, no guarding  Extremities: no deformities, no edema   Neuro; follow commands, alert and oriented x3, spontaneous speech, coherent, moves all extremities spontaneously  Psych; no hallucination, euthymic mood, not agitated      Medical Decision Making       50 MINUTES SPENT BY ME on the date of service doing chart review, history, exam, documentation & further activities per the note.  MANAGEMENT DISCUSSED with the following over the past 24 hours: Yes   NOTE(S)/MEDICAL RECORDS REVIEWED over the past 24 hours: Yes       Data     I have personally reviewed the following data over the past 24 hrs:    8.9  \   12.9 (L)   / 135 (L)     139 106 14.1 /  95   4.1 27 1.53 (H) \     INR:  2.52 (H) PTT:  N/A   D-dimer:  N/A Fibrinogen:  N/A       Imaging results reviewed over the past 24 hrs:   No results found for this or any previous visit (from the past 24  hour(s)).

## 2023-09-30 NOTE — PLAN OF CARE
Assessments:   A/Ox4. VSS. Up independently, ambulated in hallway. Tylenol given for some headache, afebrile during shift.     Treatment Plan: Merrem, Vanco, monitor creat, ID and Urology following    Bedside Nurse: Abilio Loya RN

## 2023-09-30 NOTE — CONSULTS
[facility]    Infectious Disease Consultation     Date of Admission:  9/29/2023  Date of Consult (When I saw the patient): 09/30/23    Assessment & Plan   Jim Flores is a 48 year old who was admitted on 9/29/2023.     Impression: 1 48-year-old male, acute sepsis occurring after urologic procedure with stents in place, Staph epidermidis bacteremia same organism in the urine so likely real, question stents colonized no obvious secondary sites  2 nephrolithiasis recent procedure including stents  3 systemic lupus on immunosuppressive  4 acute renal insufficiency, occurring before any vancomycin  5 prior episode of Enterococcus sepsis years ago from a urinary tract and a Staph aureus skin infection major chronic sinusitis problem this has been stable in recent years  6 allergic reactions mostly rashes to essentially all classes of antibiotics    REC 1 Vanco on board agree with holding for now till we see what the creatinine does, await the culture results hopefully be sensitive.  If sensitive meropenem is covering, antibiotics will be extremely difficult here due to the allergies  2 exact urologic approach is to include consideration of the difficulty with antibiotic treatment here        Souleymane Hamm MD    Reason for Consult   Reason for consult: I was asked to evaluate this patient for bacteremia.    Primary Care Physician   Good Mooney    Chief Complaint   Fever and chills    History is obtained from the patient and medical records    History of Present Illness   Jim Flores is a 48 year old male who presents with underlying lupus and new kidney stone disease.  Recent urologic procedure including stents now with sepsis, acute renal failure.  Found to have Staph epidermidis in 1 blood culture and also in his urine probably real.  Feels ill still today but somewhat better.  Dose of vancomycin but now on hold as his renal insufficiency at admission prior to this has had 1 prior urosepsis  episode with Enterococcus in 2015 and a Staph aureus skin abscess and also left sinusitis problems.  As part of that has had numerous allergic reactions historically     Past Medical History   I have reviewed this patient's medical history and updated it with pertinent information if needed.   Past Medical History:   Diagnosis Date    Antiphospholipid antibody positive     Anxiety state, unspecified     Cardiomegaly 9/7/2023    Hypercalciuria     Nephrolithiasis     Pulmonary infarct (H) 9/1/2008    SLE with normal kidneys (H)        Past Surgical History   I have reviewed this patient's surgical history and updated it with pertinent information if needed.  Past Surgical History:   Procedure Laterality Date    BIOPSY  2014, 2021    Mole check - all good    COLONOSCOPY  11/19/2012    Procedure: COLONOSCOPY;  Colonoscopy;  Surgeon: Lupe Ratliff MD;  Location: [facility]    COLONOSCOPY Left 4/12/2022    Procedure: COLONOSCOPY, FLEXIBLE, WITH POLYP REMOVAL USING HOT SNARE;  Surgeon: Lupe Ratliff MD;  Location: [facility]    COMBINED CYSTOSCOPY, RETROGRADES, URETEROSCOPY, LASER HOLMIUM LITHOTRIPSY URETER(S), INSERT STENT Left 6/24/2015    Procedure: COMBINED CYSTOSCOPY, RETROGRADES, URETEROSCOPY, LASER HOLMIUM LITHOTRIPSY URETER(S), INSERT STENT;  Surgeon: Ramesh Johnson MD;  Location: [facility]    COMBINED CYSTOSCOPY, RETROGRADES, URETEROSCOPY, LASER HOLMIUM LITHOTRIPSY URETER(S), INSERT STENT Bilateral 9/25/2023    Procedure: Cystoscopy, bilateral ureteroscopy with holmium laser lithotripsy, bilateral ureteroscopy with stone basketing, bilateral retrograde pyelogram, bilateral ureteral stent exchange;  Surgeon: Sheng Barrientos MD;  Location: [facility]    CYSTOSCOPY, RETROGRADES, INSERT STENT URETER(S), COMBINED Bilateral 9/7/2023    Procedure: 1. Cystourethroscopy with bilateral ureteral stent placement 2. Bilateral retrograde pyelography with interpretation of intraoperative fluoroscopic imaging;  Surgeon:  Demetris Castillo MD;  Location: [facility]    GENITOURINARY SURGERY  2015 2018    kidney stone lithotripsy    HEAD & NECK SURGERY  2018    parathyroidectomy    kidnet stone remov      at [place] March 12 2018    LASER HOLMIUM LITHOTRIPSY URETER(S), INSERT STENT, COMBINED Right 11/11/2015    Procedure: COMBINED CYSTOSCOPY, URETEROSCOPY, LASER HOLMIUM LITHOTRIPSY URETER(S), INSERT STENT;  Surgeon: Ramesh Johnson MD;  Location: [facility]    ZZHC REPAIR INCISIONAL HERNIA,REDUCIBLE  1991    Hernia Repair, Incisional, Unilateral       Prior to Admission Medications   Prior to Admission Medications   Prescriptions Last Dose Informant Patient Reported? Taking?   LORazepam (ATIVAN) 0.5 MG tablet Past Month  No Yes   Sig: Take 1 tablet (0.5 mg) by mouth every 6 hours as needed for anxiety   NONFORMULARY Past Week  Yes Yes   Sig: Take 1 capsule by mouth 2 times daily Methyl Guard Plus   Omega-3 Fatty Acids (FISH OIL) 1200 MG CPDR Past Week  Yes Yes   Sig: Take 1,200 Units by mouth 2 times daily   VITAMIN D, CHOLECALCIFEROL, PO 9/28/2023 at AM  Yes Yes   Sig: Take 1,000 Units by mouth daily   acetaminophen (TYLENOL) 500 MG tablet Past Month  No Yes   Sig: Take 2 tablets (1,000 mg) by mouth every 6 hours as needed for mild pain   atorvastatin (LIPITOR) 10 MG tablet 9/28/2023 at HS  No Yes   Sig: Take 1 tablet (10 mg) by mouth daily   docusate sodium (COLACE) 100 MG capsule Past Week  Yes Yes   Sig: Take 100 mg by mouth 2 times daily as needed for constipation   fluticasone (FLONASE) 50 MCG/ACT nasal spray Past Month  Yes Yes   Sig: Spray 1-2 sprays into both nostrils daily as needed for rhinitis or allergies   hydroxychloroquine (PLAQUENIL) 200 MG tablet 9/28/2023 at X 2  Yes Yes   Sig: Take 200 mg by mouth 2 times daily.   ketoconazole (NIZORAL) 2 % external shampoo Past Month  Yes Yes   Sig: Use topically 1-2 times a week   oxyBUTYnin (DITROPAN) 5 MG tablet More than a month at NOT USING  No Yes   Sig: Take 1 tablet  (5 mg) by mouth 3 times daily as needed for bladder spasms   oxyCODONE (ROXICODONE) 5 MG tablet Past Week  No Yes   Sig: Take 1 tablet (5 mg) by mouth every 6 hours as needed for severe pain (IF pain not managed with non-pharmacological and non-opioid interventions)   predniSONE (DELTASONE) 5 MG tablet 9/28/2023 at AM  Yes Yes   Sig: Take 12.5 mg by mouth daily   propranolol (INDERAL) 10 MG tablet Past Month  No Yes   Sig: Take 1 tablet (10 mg) by mouth 2 times daily as needed (anxiety)   triamcinolone (KENALOG) 0.1 % external cream Past Month  Yes Yes   Sig: Apply topically to affected areas 1-2 times a week   venlafaxine (EFFEXOR-ER) 150 MG 24 hr tablet 9/28/2023 at X 2  Yes Yes   Sig: Take 150 mg by mouth 2 times daily   warfarin ANTICOAGULANT (COUMADIN) 5 MG tablet 9/27/2023 at PM  Yes No   Sig: Take by mouth daily , 7.5mg on Wednesdays & 10mg on all other days of the week      Facility-Administered Medications: None     Allergies   Allergies   Allergen Reactions    Ampicillin Hives    Bactrim Hives    Ceftazidime     Cefuroxime      Headache, stiff neck and joint pain    Cipro [Ciprofloxacin]      Stiff neck, joint pain, muscle aches    Flomax [Tamsulosin]      Swelling on  face    Ketorolac Tromethamine Itching    Rodolfo Flavor Unknown     rodolfo fruit gives lip swelling, hives    Penicillins Hives and Unknown    Poison Ivy Extract Unknown    Sulfamethoxazole-Trimethoprim Unknown    Tramadol Itching    Trimethoprim Hives       Immunization History   Immunization History   Administered Date(s) Administered    COVID-19 Bivalent 12+ (Pfizer) 11/03/2022    COVID-19 MONOVALENT 12+ (Pfizer) 03/09/2021, 04/01/2021, 09/03/2021    COVID-19 Monovalent 12+ (Pfizer 2022) 04/08/2022    Flu, Unspecified 11/16/2020    Influenza (H1N1) 12/22/2009    Influenza (IIV3) PF 10/24/2008, 09/21/2009, 11/22/2010, 10/11/2018    Influenza,INJ,MDCK,PF,Quad >6mo(Flucelvax) 11/03/2022    Pneumococcal 23 valent 10/24/2008    TD,PF 7+  ([facility]) 07/19/2019    TDAP Vaccine (Adacel) 10/28/2008       Social History   I have reviewed this patient's social history and updated it with pertinent information if needed. Jim Flores  reports that he has never smoked. He has never used smokeless tobacco. He reports current alcohol use. He reports that he does not use drugs.    Family History   I have reviewed this patient's family history and updated it with pertinent information if needed.   Family History   Problem Relation Age of Onset    Cancer Mother         thyroid and uterine    Hyperlipidemia Mother     Other Cancer Mother         Ovarian    Thyroid Disease Mother     Diabetes Father     Anxiety Disorder Father     Prostate Cancer Maternal Grandfather     Arthritis Paternal Grandmother         Type not known    Cardiovascular Paternal Grandfather         CHF    Gastrointestinal Disease Daughter         Celiac disease    Colon Cancer No family hx of        Review of Systems   The 10 point Review of Systems is negative septa as above is had some flank discomfort with this really has not had overt lower urinary tract symptoms.  Definite fevers and chills but no other new focal pain site    Physical Exam   Temp: 98.7  F (37.1  C) Temp src: Oral BP: 128/86 Pulse: 91   Resp: 20 SpO2: 97 % O2 Device: None (Room air)    Vital Signs with Ranges  Temp:  [98.7  F (37.1  C)-100.2  F (37.9  C)] 98.7  F (37.1  C)  Pulse:  [84-91] 91  Resp:  [20] 20  BP: (116-128)/(71-86) 128/86  SpO2:  [97 %-100 %] 97 %  166 lbs 0 oz  Body mass index is 24.51 kg/m .    GENERAL APPEARANCE:  awake  EYES: Eyes grossly normal to inspection  NECK: no adenopathy  RESP: lungs clear   CV: regular rates and rhythm  LYMPHATICS: normal ant/post cervical and supraclavicular nodes  ABDOMEN: soft, nontender  MS: extremities normal  SKIN: no suspicious lesions or rashes        Data   All laboratory and imaging data in the past 24 hours reviewed  No results for input(s): CULT in the last 168  hours.  Recent Labs   Lab Test 07/09/21  1850 03/01/17  1020 11/17/15  1115 11/17/15  1045 11/17/15  0012 11/17/15  0006 11/16/15  1010 11/16/15  1006 11/14/15  1730   CULT No growth <10,000 colonies/mL urogenital lopez Susceptibility testing not routinely done No growth No growth No growth No growth No growth No growth No growth          All cultures:  Recent Labs   Lab 09/29/23  0102   CULTURE >100,000 CFU/mL Staphylococcus epidermidis*  No growth after 1 day  Positive on the 1st day of incubation*  Gram positive cocci in clusters*      Blood culture:  Results for orders placed or performed during the hospital encounter of 09/29/23   Blood Culture Peripheral Blood    Specimen: Peripheral Blood   Result Value Ref Range    Culture Positive on the 1st day of incubation (A)     Culture Gram positive cocci in clusters (AA)    Blood Culture Peripheral Blood    Specimen: Peripheral Blood   Result Value Ref Range    Culture No growth after 1 day    Results for orders placed or performed during the hospital encounter of 11/14/15   Blood culture    Specimen: Blood   Result Value Ref Range    Specimen Description Blood Red port     Culture Micro No growth     Micro Report Status FINAL 11/23/2015    Blood culture    Specimen: Blood   Result Value Ref Range    Specimen Description Blood Left Arm     Culture Micro No growth     Micro Report Status FINAL 11/23/2015    Blood culture    Specimen: Blood   Result Value Ref Range    Specimen Description Blood RARM     Culture Micro No growth     Micro Report Status FINAL 11/23/2015    Blood culture    Specimen: Blood   Result Value Ref Range    Specimen Description Blood BLUEP     Culture Micro No growth     Micro Report Status FINAL 11/23/2015    Blood culture    Specimen: Blood   Result Value Ref Range    Specimen Description Blood Right Hand     Culture Micro No growth     Micro Report Status FINAL 11/22/2015    Blood culture    Specimen: Blood   Result Value Ref Range     Specimen Description Blood Left Arm     Culture Micro No growth     Micro Report Status FINAL 11/22/2015    Blood culture    Specimen: Blood   Result Value Ref Range    Specimen Description Right Hand     Culture Micro No growth     Micro Report Status FINAL 11/20/2015    Blood culture    Specimen: Blood   Result Value Ref Range    Specimen Description Blood Left Hand     Culture Micro (A)      Cultured on the 1st day of incubation: Enterococcus faecalis  Critical Value/Significant Value, preliminary result only, called to and read   back by Jim Crocker, RN @0940 on 11/15/15. cms.  (Note)  POSITIVE for ENTEROCOCCUS FAECALIS and NEGATIVE for Gemini/vanB genes  by SmartAssetigene multiplex nucleic acid test. Final identification and  antimicrobial susceptibility testing will be verified by standard  methods.    Specimen tested with Verigene multiplex, gram-positive blood culture  nucleic acid test for the following targets: Staph aureus, Staph  epidermidis, Staph lugdunensis, other Staph species, Enterococcus  faecalis, Enterococcus faecium, Streptococcus species, S. agalactiae,  S. anginosus grp., S. pneumoniae, S. pyogenes, Listeria sp., mecA  (methicillin resistance) and Gemini/B (vancomycin resistance).    Critical Value/Significant Value called to and read back by Jim Crocker RN @3750 11/15/15 gd      Micro Report Status FINAL 11/17/2015     Organism:       Cultured on the 1st day of incubation: Enterococcus faecalis       Susceptibility    Cultured on the 1st day of incubation: enterococcus faecalis (susanna) -  (no method available)     Ampicillin <=2 Susceptible  ug/mL     Penicillin 4 Susceptible  ug/mL     Vancomycin 1 Susceptible  ug/mL     Gentamicin Screen Value in next row        SusceptibleNo high level gentamicin resistance found - If no high level gentamicin resistance is found, combination therapy with an aminoglycoside may be indicated for serious enterococcal infections such as bacteremia and endocarditis.    Results for orders placed or performed in visit on 07/14/09   Blood culture   Result Value Ref Range    Specimen Description Right Hand     Culture Micro No growth after 6 days     Micro Report Status FINAL [id number]    Blood culture   Result Value Ref Range    Specimen Description Left Arm     Culture Micro No growth after 6 days     Micro Report Status FINAL [id number]    Results for orders placed or performed in visit on 08/28/08   Blood culture   Result Value Ref Range    Specimen Description Right Arm     Culture Micro No growth after 6 days     Micro Report Status FINAL [id number]    Blood culture   Result Value Ref Range    Specimen Description Left Arm     Culture Micro No growth after 6 days     Micro Report Status FINAL [id number]       Urine culture:  Results for orders placed or performed during the hospital encounter of 09/29/23   Urine Culture    Specimen: Urine, Midstream   Result Value Ref Range    Culture >100,000 CFU/mL Staphylococcus epidermidis (A)    Results for orders placed or performed during the hospital encounter of 09/07/23   Urine Culture    Specimen: Urine, Midstream   Result Value Ref Range    Culture No Growth    Results for orders placed or performed during the hospital encounter of 07/09/21   Urine Culture Aerobic Bacterial    Specimen: Midstream Urine   Result Value Ref Range    Specimen Description Midstream Urine     Special Requests Specimen received in preservative     Culture Micro No growth    Results for orders placed or performed in visit on 03/01/17   Urine Culture Aerobic Bacterial    Specimen: Urine   Result Value Ref Range    Specimen Description Urine     Culture Micro       <10,000 colonies/mL urogenital lopez Susceptibility testing not routinely done    Micro Report Status FINAL 03/02/2017    Results for orders placed or performed during the hospital encounter of 11/14/15   Urine Culture Aerobic Bacterial   Result Value Ref Range    Specimen Description Midstream Urine      Special Requests Specimen received in preservative     Culture Micro >100,000 colonies/mL Enterococcus species (A)     Micro Report Status FINAL 11/16/2015     Organism: >100,000 colonies/mL Enterococcus species        Susceptibility    >100,000 colonies/ml enterococcus species (susanna) -  (no method available)     Ampicillin <=2 Susceptible  ug/mL     Nitrofurantoin <=16 Susceptible  ug/mL     Penicillin 4 Susceptible  ug/mL     Vancomycin 1 Susceptible  ug/mL     Gentamicin Screen Value in next row        SusceptibleNo high level gentamicin resistance found - If no high level gentamicin resistance is found, combination therapy with an aminoglycoside may be indicated for serious enterococcal infections such as bacteremia and endocarditis.   Results for orders placed or performed in visit on 11/04/15   Urine Culture Aerobic Bacterial    Specimen: Urine   Result Value Ref Range    Specimen Description Midstream Urine     Culture Micro       <10,000 colonies/mL mixed urogenital lopez  Susceptibility testing not routinely done      Micro Report Status FINAL 11/05/2015    Results for orders placed or performed in visit on 06/18/15   Urine Culture Aerobic Bacterial    Specimen: Urine clean catch   Result Value Ref Range    Specimen Description Midstream Urine     Culture Micro No growth     Micro Report Status FINAL 06/19/2015    Results for orders placed or performed in visit on 09/11/14   Urine culture    Specimen: Urine   Result Value Ref Range    Specimen Description Midstream Urine     Culture Micro       <10,000 colonies/mL urogenital lopez  Susceptibility testing not routinely done      Micro Report Status FINAL 09/13/2014

## 2023-10-01 LAB
ALBUMIN SERPL BCG-MCNC: 3.1 G/DL (ref 3.5–5.2)
ALP SERPL-CCNC: 45 U/L (ref 40–129)
ALT SERPL W P-5'-P-CCNC: 61 U/L (ref 0–70)
ANION GAP SERPL CALCULATED.3IONS-SCNC: 8 MMOL/L (ref 7–15)
AST SERPL W P-5'-P-CCNC: 34 U/L (ref 0–45)
BACTERIA UR CULT: ABNORMAL
BASOPHILS # BLD AUTO: 0 10E3/UL (ref 0–0.2)
BASOPHILS NFR BLD AUTO: 1 %
BILIRUB SERPL-MCNC: 0.4 MG/DL
BUN SERPL-MCNC: 13.5 MG/DL (ref 6–20)
CALCIUM SERPL-MCNC: 8.6 MG/DL (ref 8.6–10)
CHLORIDE SERPL-SCNC: 108 MMOL/L (ref 98–107)
CREAT SERPL-MCNC: 1.32 MG/DL (ref 0.67–1.17)
DEPRECATED HCO3 PLAS-SCNC: 26 MMOL/L (ref 22–29)
EGFRCR SERPLBLD CKD-EPI 2021: 66 ML/MIN/1.73M2
EOSINOPHIL # BLD AUTO: 0.2 10E3/UL (ref 0–0.7)
EOSINOPHIL NFR BLD AUTO: 3 %
ERYTHROCYTE [DISTWIDTH] IN BLOOD BY AUTOMATED COUNT: 13.7 % (ref 10–15)
GLUCOSE SERPL-MCNC: 90 MG/DL (ref 70–99)
HCT VFR BLD AUTO: 39.8 % (ref 40–53)
HGB BLD-MCNC: 12.7 G/DL (ref 13.3–17.7)
IMM GRANULOCYTES # BLD: 0 10E3/UL
IMM GRANULOCYTES NFR BLD: 0 %
INR PPP: 1.74 (ref 0.85–1.15)
LYMPHOCYTES # BLD AUTO: 0.7 10E3/UL (ref 0.8–5.3)
LYMPHOCYTES NFR BLD AUTO: 10 %
MCH RBC QN AUTO: 28.7 PG (ref 26.5–33)
MCHC RBC AUTO-ENTMCNC: 31.9 G/DL (ref 31.5–36.5)
MCV RBC AUTO: 90 FL (ref 78–100)
MONOCYTES # BLD AUTO: 0.7 10E3/UL (ref 0–1.3)
MONOCYTES NFR BLD AUTO: 9 %
NEUTROPHILS # BLD AUTO: 5.4 10E3/UL (ref 1.6–8.3)
NEUTROPHILS NFR BLD AUTO: 77 %
NRBC # BLD AUTO: 0 10E3/UL
NRBC BLD AUTO-RTO: 0 /100
PLATELET # BLD AUTO: 149 10E3/UL (ref 150–450)
POTASSIUM SERPL-SCNC: 3.8 MMOL/L (ref 3.4–5.3)
PROT SERPL-MCNC: 5.2 G/DL (ref 6.4–8.3)
RBC # BLD AUTO: 4.42 10E6/UL (ref 4.4–5.9)
SODIUM SERPL-SCNC: 142 MMOL/L (ref 135–145)
WBC # BLD AUTO: 7.1 10E3/UL (ref 4–11)

## 2023-10-01 PROCEDURE — 250N000011 HC RX IP 250 OP 636: Performed by: INTERNAL MEDICINE

## 2023-10-01 PROCEDURE — 250N000011 HC RX IP 250 OP 636: Mod: JZ | Performed by: HOSPITALIST

## 2023-10-01 PROCEDURE — 258N000003 HC RX IP 258 OP 636: Performed by: HOSPITALIST

## 2023-10-01 PROCEDURE — 36415 COLL VENOUS BLD VENIPUNCTURE: CPT | Performed by: INTERNAL MEDICINE

## 2023-10-01 PROCEDURE — 99231 SBSQ HOSP IP/OBS SF/LOW 25: CPT | Performed by: STUDENT IN AN ORGANIZED HEALTH CARE EDUCATION/TRAINING PROGRAM

## 2023-10-01 PROCEDURE — 87040 BLOOD CULTURE FOR BACTERIA: CPT | Performed by: INTERNAL MEDICINE

## 2023-10-01 PROCEDURE — 258N000003 HC RX IP 258 OP 636: Performed by: INTERNAL MEDICINE

## 2023-10-01 PROCEDURE — 85025 COMPLETE CBC W/AUTO DIFF WBC: CPT | Performed by: INTERNAL MEDICINE

## 2023-10-01 PROCEDURE — 85610 PROTHROMBIN TIME: CPT | Performed by: INTERNAL MEDICINE

## 2023-10-01 PROCEDURE — 99232 SBSQ HOSP IP/OBS MODERATE 35: CPT | Performed by: INTERNAL MEDICINE

## 2023-10-01 PROCEDURE — 250N000012 HC RX MED GY IP 250 OP 636 PS 637: Performed by: HOSPITALIST

## 2023-10-01 PROCEDURE — 120N000001 HC R&B MED SURG/OB

## 2023-10-01 PROCEDURE — 80053 COMPREHEN METABOLIC PANEL: CPT | Performed by: INTERNAL MEDICINE

## 2023-10-01 PROCEDURE — 250N000013 HC RX MED GY IP 250 OP 250 PS 637: Performed by: INTERNAL MEDICINE

## 2023-10-01 RX ORDER — WARFARIN SODIUM 5 MG/1
5 TABLET ORAL
Status: COMPLETED | OUTPATIENT
Start: 2023-10-01 | End: 2023-10-01

## 2023-10-01 RX ADMIN — SODIUM CHLORIDE, POTASSIUM CHLORIDE, SODIUM LACTATE AND CALCIUM CHLORIDE: 600; 310; 30; 20 INJECTION, SOLUTION INTRAVENOUS at 06:51

## 2023-10-01 RX ADMIN — VENLAFAXINE HYDROCHLORIDE 150 MG: 150 CAPSULE, EXTENDED RELEASE ORAL at 08:22

## 2023-10-01 RX ADMIN — MEROPENEM 1 G: 1 INJECTION, POWDER, FOR SOLUTION INTRAVENOUS at 02:08

## 2023-10-01 RX ADMIN — VANCOMYCIN HYDROCHLORIDE 1500 MG: 10 INJECTION, POWDER, LYOPHILIZED, FOR SOLUTION INTRAVENOUS at 11:27

## 2023-10-01 RX ADMIN — SODIUM CHLORIDE, POTASSIUM CHLORIDE, SODIUM LACTATE AND CALCIUM CHLORIDE: 600; 310; 30; 20 INJECTION, SOLUTION INTRAVENOUS at 18:47

## 2023-10-01 RX ADMIN — ATORVASTATIN CALCIUM 10 MG: 10 TABLET, FILM COATED ORAL at 20:30

## 2023-10-01 RX ADMIN — VENLAFAXINE HYDROCHLORIDE 150 MG: 150 CAPSULE, EXTENDED RELEASE ORAL at 20:30

## 2023-10-01 RX ADMIN — PREDNISONE 12.5 MG: 2.5 TABLET ORAL at 08:22

## 2023-10-01 RX ADMIN — WARFARIN SODIUM 5 MG: 5 TABLET ORAL at 18:06

## 2023-10-01 ASSESSMENT — ACTIVITIES OF DAILY LIVING (ADL)
ADLS_ACUITY_SCORE: 39

## 2023-10-01 NOTE — PROGRESS NOTES
[facility] Urology Post-Op / Progress Note         Assessment and Plan:    Assessment:   Post-operative day #6  bilateral ureteroscopy with laser lithotripsy/stone basketing/stent exchange     Staph epidermidis UTI and sepsis    Long discussion with patient regarding next steps. I continue to recommend repeat ureteroscopy in the next few weeks to clear the remaining stone burden. He is leaning towards postponing any further intervention at this time. If he does not want another ureteroscopy in the next  few weeks, would then plan to return to clinic to remove the stents. Counseled patient on risks of recurrent obstructing ureteral stone with need for surgery to treat, infection, pain, etc. He wants to think about it         Plan:   Abx per ID  Patient thinking about whether he wants to proceed with repeat ureteroscopy vs. Removing stents and observing stones until next summer. He will let us know what he decides    Sheng Barrientos MD   [facility] Urology  [phone] clinic phone    Over 25 minutes spent on this encounter including time spent talking to the patient, reviewing prior imaging and records, documentation           Interval History:   Feeling better          Significant Problems:      Past Medical History:   Diagnosis Date    Antiphospholipid antibody positive     Anxiety state, unspecified     Cardiomegaly 9/7/2023    Hypercalciuria     Nephrolithiasis     Pulmonary infarct (H) 9/1/2008    SLE with normal kidneys (H)              Review of Systems:    The Review of Systems is negative other than noted in the HPI          Medications:   All medications related to the patient's surgery have been reviewed  Current Facility-Administered Medications   Medication    acetaminophen (TYLENOL) tablet 650 mg    Or    acetaminophen (TYLENOL) Suppository 650 mg    acetaminophen (TYLENOL) tablet 1,000 mg    atorvastatin (LIPITOR) tablet 10 mg    bisacodyl (DULCOLAX) suppository 10 mg    docusate sodium  (COLACE) capsule 100 mg    fluticasone (FLONASE) 50 MCG/ACT spray 1-2 spray    HYDROmorphone (DILAUDID) injection 0.2 mg    [Held by provider] hydroxychloroquine (PLAQUENIL) tablet 200 mg    lactated ringers infusion    LORazepam (ATIVAN) tablet 0.5 mg    melatonin tablet 3 mg    naloxone (NARCAN) injection 0.2 mg    Or    naloxone (NARCAN) injection 0.4 mg    Or    naloxone (NARCAN) injection 0.2 mg    Or    naloxone (NARCAN) injection 0.4 mg    ondansetron (ZOFRAN ODT) ODT tab 4 mg    Or    ondansetron (ZOFRAN) injection 4 mg    oxyBUTYnin (DITROPAN) tablet 5 mg    oxyCODONE IR (ROXICODONE) half-tab 2.5 mg    Patient is already receiving anticoagulation with heparin, enoxaparin (LOVENOX), warfarin (COUMADIN)  or other anticoagulant medication    polyethylene glycol (MIRALAX) Packet 17 g    predniSONE (DELTASONE) tablet 12.5 mg    propranolol (INDERAL) tablet 10 mg    senna-docusate (SENOKOT-S/PERICOLACE) 8.6-50 MG per tablet 1 tablet    Or    senna-docusate (SENOKOT-S/PERICOLACE) 8.6-50 MG per tablet 2 tablet    vancomycin (VANCOCIN) 1,500 mg in 0.9% NaCl 250 mL intermittent infusion    venlafaxine (EFFEXOR XR) 24 hr capsule 150 mg    warfarin ANTICOAGULANT (COUMADIN) tablet 5 mg    Warfarin Dose Required Daily - Pharmacist Managed             Physical Exam:   All vitals stable  Temp: 98.9  F (37.2  C) Temp src: Oral BP: (!) 149/95 Pulse: 90   Resp: 16 SpO2: 99 % O2 Device: None (Room air)    NAD          Data:   All laboratory data related to this surgery reviewed  Results for orders placed or performed during the hospital encounter of 09/29/23 (from the past 24 hour(s))   CBC with Platelets & Differential    Narrative    The following orders were created for panel order CBC with Platelets & Differential.  Procedure                               Abnormality         Status                     ---------                               -----------         ------                     CBC with platelets and  dJyoti..[[id number]]  Abnormal            Final result                 Please view results for these tests on the individual orders.   INR   Result Value Ref Range    INR 1.74 (H) 0.85 - 1.15   Comprehensive metabolic panel   Result Value Ref Range    Sodium 142 135 - 145 mmol/L    Potassium 3.8 3.4 - 5.3 mmol/L    Carbon Dioxide (CO2) 26 22 - 29 mmol/L    Anion Gap 8 7 - 15 mmol/L    Urea Nitrogen 13.5 6.0 - 20.0 mg/dL    Creatinine 1.32 (H) 0.67 - 1.17 mg/dL    GFR Estimate 66 >60 mL/min/1.73m2    Calcium 8.6 8.6 - 10.0 mg/dL    Chloride 108 (H) 98 - 107 mmol/L    Glucose 90 70 - 99 mg/dL    Alkaline Phosphatase 45 40 - 129 U/L    AST 34 0 - 45 U/L    ALT 61 0 - 70 U/L    Protein Total 5.2 (L) 6.4 - 8.3 g/dL    Albumin 3.1 (L) 3.5 - 5.2 g/dL    Bilirubin Total 0.4 <=1.2 mg/dL   CBC with platelets and differential   Result Value Ref Range    WBC Count 7.1 4.0 - 11.0 10e3/uL    RBC Count 4.42 4.40 - 5.90 10e6/uL    Hemoglobin 12.7 (L) 13.3 - 17.7 g/dL    Hematocrit 39.8 (L) 40.0 - 53.0 %    MCV 90 78 - 100 fL    MCH 28.7 26.5 - 33.0 pg    MCHC 31.9 31.5 - 36.5 g/dL    RDW 13.7 10.0 - 15.0 %    Platelet Count 149 (L) 150 - 450 10e3/uL    % Neutrophils 77 %    % Lymphocytes 10 %    % Monocytes 9 %    % Eosinophils 3 %    % Basophils 1 %    % Immature Granulocytes 0 %    NRBCs per 100 WBC 0 <1 /100    Absolute Neutrophils 5.4 1.6 - 8.3 10e3/uL    Absolute Lymphocytes 0.7 (L) 0.8 - 5.3 10e3/uL    Absolute Monocytes 0.7 0.0 - 1.3 10e3/uL    Absolute Eosinophils 0.2 0.0 - 0.7 10e3/uL    Absolute Basophils 0.0 0.0 - 0.2 10e3/uL    Absolute Immature Granulocytes 0.0 <=0.4 10e3/uL    Absolute NRBCs 0.0 10e3/uL     Reviewed prior CT scan again with patient    Sheng Barrientos MD

## 2023-10-01 NOTE — PROGRESS NOTES
[facility]    Medicine Progress Note - Hospitalist Service    Date of Admission:  9/29/2023    Assessment & Plan      Jim Flores is a 48 year old male with a history of SLE, antiphospholipid antibody on chronic anticoagulation, anxiety, hypertension, nephrolithiasis, PE, GERD and hyperparathyroidism who presents with fever and chills in the context of recent lithotripsy and bilateral ureteral stents with work-up suggestive of a complicated UTI.     Sepsis secondary to complicated urinary tract infection:   -Currently showing GPC in 1 out of 2 blood cultures   -Earlier urine culture grew Staphylococcus epidermidis   -Appreciate prompt input from ID service.  I believe patient will be restarted back on systemic vancomycin today.  -Patient is currently receiving meropenem as started during admission with numerous listed allergies    -I requested formal ID service input and evaluation in the setting of this a complicated UTI, unsure if this urine culture growing Staph epidermidis with accompanying bacteremia 1 out of 2 bottles of GPC is a true infection.    -this is in the context of a lithotripsy with bilateral ureteral stents performed on Monday of this week.  Sepsis as evidenced by fever, tachycardia, leukocytosis and lactic acidosis.   -Appreciate prompt input from urology service.   -Patient and his wife  mentioned to me that he is hoping that he can delay his surgery.  I advised him to talk to his surgeons further regarding this aspect.    # Acute kidney injury: Creatinine is slightly elevated from baseline currently at 1.32 with a baseline of 1.1.  Continue fluid resuscitation and sepsis management.    #Antiphospholipid antibody: He is chronically anticoagulated on warfarin which is monitored by his chromogenic factor X level.  I will send a chromogenic factor X level at this time.    -Resume patient's home regimen of anticoagulation    #SLE and Sjogren's: He recently experienced a flare  and was on higher dose prednisone, currently weaning down and on 12.5 mg daily (this will be ordered for the morning).  He baseline dose is 7.5 mg daily.  We will have a low threshold for stress dose steroids.  We will hold his prior to admission hydroxychloroquine for now given active infection.    # Blood pressure medication usage: Has a prescription for propranolol that he was prescribed for anxiety but has not taken this for quite some time.  Was also previously on hydrochlorothiazide for his nephrolithiasis but again has not taken this in quite some time.    # Hyperlipidemia: His liver tests are slightly elevated, hold atorvastatin for now.  -Recheck CMP  -Transaminitis: Could be due to sepsis.  Follow periodically.  He denies any right upper quadrant pain.  -Transaminitis improving, can resume patient's statins    # Anxiety: Continue prior to admission venlafaxine.       Diet: Gluten Free Diet    DVT Prophylaxis: Warfarin  Warren Catheter: Not present  Lines: None     Cardiac Monitoring: ACTIVE order. Indication: Electrolyte Imbalance (24 hours)- Magnesium <1.3 mg/ml; Potassium < =2.8 or > 5.5 mg/ml  Code Status: Full Code      Clinically Significant Risk Factors              # Hypoalbuminemia: Lowest albumin = 3.1 g/dL at 10/1/2023  7:22 AM, will monitor as appropriate                       Disposition Plan      Expected Discharge Date: 10/01/2023                  Edwin Davis MD, MD  Hospitalist Service  [facility]  Securely message with [employer] (more info)  Text page via [facility] Paging/Directory   ______________________________________________________________________    Interval History   Continuing medicine service care today.  Seen and examined.  Chart reviewed.    I met Jim this morning while he is laying comfortably in bed.  He appears to be in better spirits as he thinks he is a little better and endorsing tolerance to oral diet.  No reported nausea or vomiting.  Had some  pasty loose stooling earlier.  No reported bleeding tendencies.  Still voiding freely.  Afebrile.  Not requiring oxygen support.  No mental status changes.    No hematuria.    Physical Exam   Vital Signs: Temp: 98.3  F (36.8  C) Temp src: Oral BP: 138/88 Pulse: 78   Resp: 18 SpO2: 96 % O2 Device: None (Room air)    Weight: 166 lbs 0 oz    HEENT; Atraumatic, normocephalic, pinkish conjuctiva, pupils bilateral reactive   Skin: warm and moist, no rashes  Lungs: equal chest expansion, clear to auscultation, no wheezes, no stridor, no crackles,   Heart: normal rate, normal rhythm, no rubs or gallops.   Abdomen: normal bowel sounds, no tenderness, no peritoneal signs, no guarding  Extremities: no deformities, no edema   Neuro; follow commands, alert and oriented x3, spontaneous speech, coherent, moves all extremities spontaneously  Psych; no hallucination, euthymic mood, not agitated      Medical Decision Making       50 MINUTES SPENT BY ME on the date of service doing chart review, history, exam, documentation & further activities per the note.  MANAGEMENT DISCUSSED with the following over the past 24 hours: Yes   NOTE(S)/MEDICAL RECORDS REVIEWED over the past 24 hours: Yes       Data     I have personally reviewed the following data over the past 24 hrs:    7.1  \   12.7 (L)   / 149 (L)     142 108 (H) 13.5 /  90   3.8 26 1.32 (H) \     ALT: 61 AST: 34 AP: 45 TBILI: 0.4   ALB: 3.1 (L) TOT PROTEIN: 5.2 (L) LIPASE: N/A     INR:  1.74 (H) PTT:  N/A   D-dimer:  N/A Fibrinogen:  N/A     Imaging results reviewed over the past 24 hrs:   No results found for this or any previous visit (from the past 24 hour(s)).

## 2023-10-01 NOTE — PROGRESS NOTES
[facility]  Infectious Disease Progress Note          Assessment and Plan:   Date of Admission:  9/29/2023  Date of Consult (When I saw the patient): 09/30/23        Assessment & Plan  Jim Florse is a 48 year old who was admitted on 9/29/2023.      Impression: 1 48-year-old male, acute sepsis occurring after urologic procedure with stents in place, Staph epidermidis bacteremia same organism in the urine so likely real, question stents colonized no obvious secondary sites  2 nephrolithiasis recent procedure including stents  3 systemic lupus on immunosuppressive  4 acute renal insufficiency, occurring before any vancomycin  5 prior episode of Enterococcus sepsis years ago from a urinary tract and a Staph aureus skin infection major chronic sinusitis problem this has been stable in recent years  6 allergic reactions mostly rashes to essentially all classes of antibiotics     REC 1 Vanco restart, creatinine somewhat better and was elevated before the Vanco, need Vanco for the sensitivities now back on the urine anyway, await sensitivities on the blood culture.  2 repeat blood cultures, temp is down feels better, primarily because of the sensitivities probably going to be stuck with some IV vancomycin here.  Follow-up full culture and follow clinical improvement and check on coverage options tomorrow                 Interval History:     no new complaints and doing well; no cp, sob, n/v/d, or abd pain.  Feels better no focal symptoms, temp is down.  Blood culture pending including identification repeat cultures just ordered, urine staph epi is oxacillin resistant              Medications:      atorvastatin  10 mg Oral At Bedtime    [Held by provider] hydroxychloroquine  200 mg Oral BID    meropenem  1 g Intravenous Q8H    predniSONE  12.5 mg Oral Daily    venlafaxine  150 mg Oral BID    Warfarin Therapy Reminder  1 each Oral See Admin Instructions                  Physical Exam:   Blood  "pressure 138/88, pulse 78, temperature 98.3  F (36.8  C), temperature source Oral, resp. rate 18, height 1.753 m (5' 9\"), weight 75.3 kg (166 lb), SpO2 96 %.  Wt Readings from Last 2 Encounters:   09/29/23 75.3 kg (166 lb)   09/25/23 75.8 kg (167 lb)     Vital Signs with Ranges  Temp:  [98.3  F (36.8  C)-99  F (37.2  C)] 98.3  F (36.8  C)  Pulse:  [72-83] 78  Resp:  [18-20] 18  BP: (124-138)/(80-88) 138/88  SpO2:  [96 %-99 %] 96 %    Constitutional: Awake, alert, cooperative, no apparent distress     Lungs: Clear to auscultation bilaterally, no crackles or wheezing   Cardiovascular: Regular rate and rhythm, normal S1 and S2, and no murmur noted   Abdomen: Normal bowel sounds, soft, non-distended, non-tender   Skin: No rashes, no cyanosis, no edema   Other:           Data:   All microbiology laboratory data reviewed.  Recent Labs   Lab Test 10/01/23  0722 09/30/23  0711 09/29/23  0102   WBC 7.1 8.9 11.1*   HGB 12.7* 12.9* 14.8   HCT 39.8* 39.4* 44.2   MCV 90 88 85   * 135* 183     Recent Labs   Lab Test 10/01/23  0722 09/30/23  0711 09/29/23  0102   CR 1.32* 1.53* 1.33*     Recent Labs   Lab Test 11/28/15  1500   SED 15     Recent Labs   Lab Test 07/09/21  1850 03/01/17  1020 11/17/15  1115 11/17/15  1045 11/17/15  0012 11/17/15  0006 11/16/15  1010 11/16/15  1006 11/14/15  1730   CULT No growth <10,000 colonies/mL urogenital lopez Susceptibility testing not routinely done No growth No growth No growth No growth No growth No growth No growth        "

## 2023-10-01 NOTE — PLAN OF CARE
Assessments:   A/Ox4. VSS, afebrile. Up independently, ambulated in hallway. Denies any pain. Does still have frequency and urgency with urination. Voiding well. Second set of BC drawn today.     Treatment Plan: Vanco started, ID and urology following.     Bedside Nurse: Marbella Ray RN

## 2023-10-01 NOTE — PLAN OF CARE
"A&Ox4, denies pain/SOB on RA, VSS, Ind in room, Tolerating gluten free diet,  mL/hr  Abx,lab monitoring,anticipating discharge when medically stable, will continue to follow plan of care.       /85 (BP Location: Right arm)   Pulse 83   Temp 99  F (37.2  C) (Oral)   Resp 18   Ht 1.753 m (5' 9\")   Wt 75.3 kg (166 lb)   SpO2 98%   BMI 24.51 kg/m     "

## 2023-10-02 ENCOUNTER — HOME INFUSION (PRE-WILLOW HOME INFUSION) (OUTPATIENT)
Dept: PHARMACY | Facility: CLINIC | Age: 48
End: 2023-10-02

## 2023-10-02 VITALS
HEIGHT: 69 IN | SYSTOLIC BLOOD PRESSURE: 149 MMHG | HEART RATE: 88 BPM | DIASTOLIC BLOOD PRESSURE: 90 MMHG | WEIGHT: 166 LBS | OXYGEN SATURATION: 99 % | TEMPERATURE: 98.8 F | RESPIRATION RATE: 18 BRPM | BODY MASS INDEX: 24.59 KG/M2

## 2023-10-02 LAB
ANION GAP SERPL CALCULATED.3IONS-SCNC: 10 MMOL/L (ref 7–15)
BACTERIA BLD CULT: ABNORMAL
BACTERIA BLD CULT: ABNORMAL
BASOPHILS # BLD AUTO: 0 10E3/UL (ref 0–0.2)
BASOPHILS NFR BLD AUTO: 1 %
BUN SERPL-MCNC: 11.5 MG/DL (ref 6–20)
CALCIUM SERPL-MCNC: 8.5 MG/DL (ref 8.6–10)
CHLORIDE SERPL-SCNC: 107 MMOL/L (ref 98–107)
CREAT SERPL-MCNC: 1.26 MG/DL (ref 0.67–1.17)
DEPRECATED HCO3 PLAS-SCNC: 24 MMOL/L (ref 22–29)
EGFRCR SERPLBLD CKD-EPI 2021: 69 ML/MIN/1.73M2
EOSINOPHIL # BLD AUTO: 0.2 10E3/UL (ref 0–0.7)
EOSINOPHIL NFR BLD AUTO: 3 %
ERYTHROCYTE [DISTWIDTH] IN BLOOD BY AUTOMATED COUNT: 13.6 % (ref 10–15)
FACT X ACT/NOR PPP CHRO: 41 % (ref 70–130)
GLUCOSE SERPL-MCNC: 89 MG/DL (ref 70–99)
HCT VFR BLD AUTO: 37.8 % (ref 40–53)
HGB BLD-MCNC: 12.6 G/DL (ref 13.3–17.7)
IMM GRANULOCYTES # BLD: 0 10E3/UL
IMM GRANULOCYTES NFR BLD: 1 %
INR PPP: 1.52 (ref 0.85–1.15)
LYMPHOCYTES # BLD AUTO: 0.8 10E3/UL (ref 0.8–5.3)
LYMPHOCYTES NFR BLD AUTO: 13 %
MCH RBC QN AUTO: 28.8 PG (ref 26.5–33)
MCHC RBC AUTO-ENTMCNC: 33.3 G/DL (ref 31.5–36.5)
MCV RBC AUTO: 86 FL (ref 78–100)
MONOCYTES # BLD AUTO: 0.7 10E3/UL (ref 0–1.3)
MONOCYTES NFR BLD AUTO: 11 %
NEUTROPHILS # BLD AUTO: 4.5 10E3/UL (ref 1.6–8.3)
NEUTROPHILS NFR BLD AUTO: 71 %
NRBC # BLD AUTO: 0 10E3/UL
NRBC BLD AUTO-RTO: 0 /100
PLATELET # BLD AUTO: 178 10E3/UL (ref 150–450)
POTASSIUM SERPL-SCNC: 3.6 MMOL/L (ref 3.4–5.3)
RBC # BLD AUTO: 4.38 10E6/UL (ref 4.4–5.9)
SODIUM SERPL-SCNC: 141 MMOL/L (ref 135–145)
WBC # BLD AUTO: 6.3 10E3/UL (ref 4–11)

## 2023-10-02 PROCEDURE — 250N000013 HC RX MED GY IP 250 OP 250 PS 637: Performed by: INTERNAL MEDICINE

## 2023-10-02 PROCEDURE — 36569 INSJ PICC 5 YR+ W/O IMAGING: CPT

## 2023-10-02 PROCEDURE — 99231 SBSQ HOSP IP/OBS SF/LOW 25: CPT | Performed by: PHYSICIAN ASSISTANT

## 2023-10-02 PROCEDURE — 85610 PROTHROMBIN TIME: CPT | Performed by: INTERNAL MEDICINE

## 2023-10-02 PROCEDURE — 272N000579 HC TRAY POWER PICC SOLO 4FR SINGLE LUMEN

## 2023-10-02 PROCEDURE — 99239 HOSP IP/OBS DSCHRG MGMT >30: CPT | Performed by: INTERNAL MEDICINE

## 2023-10-02 PROCEDURE — 250N000012 HC RX MED GY IP 250 OP 636 PS 637: Performed by: HOSPITALIST

## 2023-10-02 PROCEDURE — 258N000003 HC RX IP 258 OP 636: Performed by: INTERNAL MEDICINE

## 2023-10-02 PROCEDURE — 85260 CLOT FACTOR X STUART-POWER: CPT | Performed by: INTERNAL MEDICINE

## 2023-10-02 PROCEDURE — 272N000400 HC DRESSING, STATSEAL DISC

## 2023-10-02 PROCEDURE — 85025 COMPLETE CBC W/AUTO DIFF WBC: CPT | Performed by: INTERNAL MEDICINE

## 2023-10-02 PROCEDURE — 250N000011 HC RX IP 250 OP 636: Performed by: INTERNAL MEDICINE

## 2023-10-02 PROCEDURE — 36415 COLL VENOUS BLD VENIPUNCTURE: CPT | Performed by: INTERNAL MEDICINE

## 2023-10-02 PROCEDURE — 80048 BASIC METABOLIC PNL TOTAL CA: CPT | Performed by: INTERNAL MEDICINE

## 2023-10-02 PROCEDURE — 99232 SBSQ HOSP IP/OBS MODERATE 35: CPT | Performed by: INTERNAL MEDICINE

## 2023-10-02 RX ORDER — HYDROXYCHLOROQUINE SULFATE 200 MG/1
200 TABLET, FILM COATED ORAL 2 TIMES DAILY
COMMUNITY
Start: 2023-10-13

## 2023-10-02 RX ORDER — LIDOCAINE 40 MG/G
CREAM TOPICAL
Status: ACTIVE | OUTPATIENT
Start: 2023-10-02 | End: 2023-10-05

## 2023-10-02 RX ORDER — WARFARIN SODIUM 5 MG/1
10 TABLET ORAL
Status: DISCONTINUED | OUTPATIENT
Start: 2023-10-02 | End: 2023-10-02 | Stop reason: HOSPADM

## 2023-10-02 RX ADMIN — PREDNISONE 12.5 MG: 2.5 TABLET ORAL at 09:11

## 2023-10-02 RX ADMIN — VANCOMYCIN HYDROCHLORIDE 1500 MG: 10 INJECTION, POWDER, LYOPHILIZED, FOR SOLUTION INTRAVENOUS at 11:33

## 2023-10-02 RX ADMIN — VENLAFAXINE HYDROCHLORIDE 150 MG: 150 CAPSULE, EXTENDED RELEASE ORAL at 09:11

## 2023-10-02 RX ADMIN — SODIUM CHLORIDE, POTASSIUM CHLORIDE, SODIUM LACTATE AND CALCIUM CHLORIDE: 600; 310; 30; 20 INJECTION, SOLUTION INTRAVENOUS at 05:53

## 2023-10-02 ASSESSMENT — ACTIVITIES OF DAILY LIVING (ADL)
ADLS_ACUITY_SCORE: 39
TOILETING_ISSUES: NO
CHANGE_IN_FUNCTIONAL_STATUS_SINCE_ONSET_OF_CURRENT_ILLNESS/INJURY: NO
ADLS_ACUITY_SCORE: 39
ADLS_ACUITY_SCORE: 24
ADLS_ACUITY_SCORE: 39
CONCENTRATING,_REMEMBERING_OR_MAKING_DECISIONS_DIFFICULTY: NO
WALKING_OR_CLIMBING_STAIRS_DIFFICULTY: NO
DRESSING/BATHING_DIFFICULTY: NO
ADLS_ACUITY_SCORE: 24
DIFFICULTY_EATING/SWALLOWING: NO
DOING_ERRANDS_INDEPENDENTLY_DIFFICULTY: NO
ADLS_ACUITY_SCORE: 39
ADLS_ACUITY_SCORE: 39
FALL_HISTORY_WITHIN_LAST_SIX_MONTHS: NO
DEPENDENT_IADLS:: INDEPENDENT
ADLS_ACUITY_SCORE: 39
VISION_MANAGEMENT: GLASSES
ADLS_ACUITY_SCORE: 24
WEAR_GLASSES_OR_BLIND: YES

## 2023-10-02 NOTE — PROGRESS NOTES
[facility] Urology Post-Op / Progress Note         Assessment and Plan:    Assessment:   Post-operative day #7  bilateral ureteroscopy with laser lithotripsy/stone basketing/stent exchange     Staph epidermidis UTI and sepsis         Plan:   -Abx per ID-appreciate their assistance. (Appears plan for PICC and Vanco)  -Patient thinking about whether he wants to proceed with repeat ureteroscopy vs. Removing stents and observing stones until next summer. He is leaning toward doing repeat ureteroscopy.    -Will plan on monitoring.    Charissa Guerra PA-C  [facility] Urology   [phone]              Interval History:   Doing better.  Afebrile without tachycardia.  WBC 6.3.  Creatinine 1.26 eGFR 69(1.32 eGFR 90(1.53 eGFR 95)).On IV Vanco.  Urine and blood cultures grew out Staph epidermitis.            Significant Problems:      Past Medical History:   Diagnosis Date    Antiphospholipid antibody positive     Anxiety state, unspecified     Cardiomegaly 9/7/2023    Hypercalciuria     Nephrolithiasis     Pulmonary infarct (H) 9/1/2008    SLE with normal kidneys (H)              Review of Systems:    The Review of Systems is negative other than noted in the HPI          Medications:   All medications related to the patient's surgery have been reviewed  Current Facility-Administered Medications   Medication    acetaminophen (TYLENOL) tablet 650 mg    Or    acetaminophen (TYLENOL) Suppository 650 mg    acetaminophen (TYLENOL) tablet 1,000 mg    atorvastatin (LIPITOR) tablet 10 mg    bisacodyl (DULCOLAX) suppository 10 mg    docusate sodium (COLACE) capsule 100 mg    fluticasone (FLONASE) 50 MCG/ACT spray 1-2 spray    HYDROmorphone (DILAUDID) injection 0.2 mg    [Held by provider] hydroxychloroquine (PLAQUENIL) tablet 200 mg    lactated ringers infusion    lidocaine 1 % 0.1-5 mL    LORazepam (ATIVAN) tablet 0.5 mg    melatonin tablet 3 mg    naloxone (NARCAN) injection 0.2 mg    Or    naloxone (NARCAN) injection 0.4 mg     Or    naloxone (NARCAN) injection 0.2 mg    Or    naloxone (NARCAN) injection 0.4 mg    ondansetron (ZOFRAN ODT) ODT tab 4 mg    Or    ondansetron (ZOFRAN) injection 4 mg    oxyBUTYnin (DITROPAN) tablet 5 mg    oxyCODONE IR (ROXICODONE) half-tab 2.5 mg    Patient is already receiving anticoagulation with heparin, enoxaparin (LOVENOX), warfarin (COUMADIN)  or other anticoagulant medication    polyethylene glycol (MIRALAX) Packet 17 g    predniSONE (DELTASONE) tablet 12.5 mg    propranolol (INDERAL) tablet 10 mg    senna-docusate (SENOKOT-S/PERICOLACE) 8.6-50 MG per tablet 1 tablet    Or    senna-docusate (SENOKOT-S/PERICOLACE) 8.6-50 MG per tablet 2 tablet    sodium chloride (PF) 0.9% PF flush 10-40 mL    vancomycin (VANCOCIN) 1,500 mg in 0.9% NaCl 250 mL intermittent infusion    venlafaxine (EFFEXOR XR) 24 hr capsule 150 mg    Warfarin Dose Required Daily - Pharmacist Managed             Physical Exam:   All vitals stable  Temp: 98.6  F (37  C) Temp src: Oral BP: (!) 146/88 Pulse: 93   Resp: 16 SpO2: 97 % O2 Device: None (Room air)    NAD, in bed.          Data:   All laboratory data related to this surgery reviewed  Results for orders placed or performed during the hospital encounter of 09/29/23 (from the past 24 hour(s))   CBC with Platelets & Differential    Narrative    The following orders were created for panel order CBC with Platelets & Differential.  Procedure                               Abnormality         Status                     ---------                               -----------         ------                     CBC with platelets and d...[[id number]]  Abnormal            Final result                 Please view results for these tests on the individual orders.   INR   Result Value Ref Range    INR 1.52 (H) 0.85 - 1.15   Basic metabolic panel   Result Value Ref Range    Sodium 141 135 - 145 mmol/L    Potassium 3.6 3.4 - 5.3 mmol/L    Chloride 107 98 - 107 mmol/L    Carbon Dioxide (CO2) 24 22 - 29  mmol/L    Anion Gap 10 7 - 15 mmol/L    Urea Nitrogen 11.5 6.0 - 20.0 mg/dL    Creatinine 1.26 (H) 0.67 - 1.17 mg/dL    GFR Estimate 69 >60 mL/min/1.73m2    Calcium 8.5 (L) 8.6 - 10.0 mg/dL    Glucose 89 70 - 99 mg/dL   CBC with platelets and differential   Result Value Ref Range    WBC Count 6.3 4.0 - 11.0 10e3/uL    RBC Count 4.38 (L) 4.40 - 5.90 10e6/uL    Hemoglobin 12.6 (L) 13.3 - 17.7 g/dL    Hematocrit 37.8 (L) 40.0 - 53.0 %    MCV 86 78 - 100 fL    MCH 28.8 26.5 - 33.0 pg    MCHC 33.3 31.5 - 36.5 g/dL    RDW 13.6 10.0 - 15.0 %    Platelet Count 178 150 - 450 10e3/uL    % Neutrophils 71 %    % Lymphocytes 13 %    % Monocytes 11 %    % Eosinophils 3 %    % Basophils 1 %    % Immature Granulocytes 1 %    NRBCs per 100 WBC 0 <1 /100    Absolute Neutrophils 4.5 1.6 - 8.3 10e3/uL    Absolute Lymphocytes 0.8 0.8 - 5.3 10e3/uL    Absolute Monocytes 0.7 0.0 - 1.3 10e3/uL    Absolute Eosinophils 0.2 0.0 - 0.7 10e3/uL    Absolute Basophils 0.0 0.0 - 0.2 10e3/uL    Absolute Immature Granulocytes 0.0 <=0.4 10e3/uL    Absolute NRBCs 0.0 10e3/uL     All cultures:  Recent Labs   Lab 10/01/23  0926 10/01/23  0919 09/29/23  0102   CULTURE No growth after 1 day No growth after 1 day Positive on the 1st day of incubation*  Staphylococcus epidermidis*  No growth after 3 days  >100,000 CFU/mL Staphylococcus epidermidis*   ;

## 2023-10-02 NOTE — PHARMACY-ANTICOAGULATION SERVICE
Clinical Pharmacy- Warfarin Discharge Note  This patient is currently on warfarin for the treatment of  h/o multiple clots, antiphospholipid syndrome  .  INR Goal=  factor x chromogenic 20-40%  Expected length of therapy lifetime.    Warfarin PTA Regimen: 7.5 mg every Wednesday, 10 mg ROW      Anticoagulation Dose History  More data exists         Latest Ref Rng & Units 4/12/2022 9/7/2023 9/8/2023 9/29/2023 9/30/2023 10/1/2023 10/2/2023   Recent Dosing and Labs   warfarin ANTICOAGULANT (COUMADIN) half-tab 0.5 mg - - - - - 0.5 mg, $Given - -   warfarin ANTICOAGULANT (COUMADIN) tablet 10 mg - - - - 10 mg, $Given - 5 mg, $Given -   Chromogenic Factor 10 70 - 130 % - - 59  45  - - -   INR 0.85 - 1.15 1.08  1.09  1.35  1.43  2.52  1.74  1.52        Vitamin K doses administered during the last 7 days: ----  FFP administered during the last 7 days: ===    Reviewed PTA, inpt and discharge meds.  Medication changes unlikely to affect INR/warfarin dosing.  Due note that factor X chromogenic was only checked day of admission, INRs since.  Warfarin doses reduced due to fluctuating INR-likely should not have been done if factor X chromogenic would have been assessed.  Now INR subetherapeutic.  Factor X chromogenic ordered stat approx 1300 today and still not drawn.  Spoke to Dr Rivera, he is aware of factor x chromogenic level and reduced warfarin doses over weekend.  He talked to pt, pt states in past when he needs to stop warfarin for procedures, etc-heme/one does not bridge him.  MD okay with discharging pt today given above info.   Pt will discharge on PTA warfarin dose w/ f/up in 2-3 days as MD ordered.

## 2023-10-02 NOTE — CONSULTS
Care Management Initial Consult    General Information  Assessment completed with: Patient,    Type of CM/SW Visit: Offer D/C Planning    Primary Care Provider verified and updated as needed:     Readmission within the last 30 days:           Advance Care Planning:            Communication Assessment  Patient's communication style: spoken language (English or Bilingual)    Hearing Difficulty or Deaf: no   Wear Glasses or Blind: yes    Cognitive  Cognitive/Neuro/Behavioral: WDL                      Living Environment:   People in home: spouse     Current living Arrangements:        Able to return to prior arrangements:         Family/Social Support:  Care provided by: spouse/significant other  Provides care for: no one  Marital Status:              Description of Support System:           Current Resources:   Patient receiving home care services: No     Community Resources:    Equipment currently used at home: none  Supplies currently used at home:      Employment/Financial:  Employment Status:          Financial Concerns:             Does the patient's insurance plan have a 3 day qualifying hospital stay waiver?  No    Lifestyle & Psychosocial Needs:  Social Determinants of Health     Food Insecurity: No Food Insecurity (11/19/2022)    Hunger Vital Sign     Worried About Running Out of Food in the Last Year: Never true     Ran Out of Food in the Last Year: Never true   Depression: Not at risk (11/21/2022)    PHQ-2     PHQ-2 Score: 0   Housing Stability: Low Risk  (11/19/2022)    Housing Stability Vital Sign     Unable to Pay for Housing in the Last Year: No     Number of Places Lived in the Last Year: 1     Unstable Housing in the Last Year: No   Tobacco Use: Low Risk  (9/25/2023)    Patient History     Smoking Tobacco Use: Never     Smokeless Tobacco Use: Never     Passive Exposure: Not on file   Financial Resource Strain: Low Risk  (11/19/2022)    Overall Financial Resource Strain (CARDIA)     Difficulty of  Paying Living Expenses: Not very hard   Alcohol Use: Not At Risk (11/19/2022)    AUDIT-C     Frequency of Alcohol Consumption: 2-4 times a month     Average Number of Drinks: 1 or 2     Frequency of Binge Drinking: Never   Transportation Needs: No Transportation Needs (11/19/2022)    PRAPARE - Transportation     Lack of Transportation (Medical): No     Lack of Transportation (Non-Medical): No   Physical Activity: Insufficiently Active (11/19/2022)    Exercise Vital Sign     Days of Exercise per Week: 1 day     Minutes of Exercise per Session: 20 min   Interpersonal Safety: Not on file   Stress: Stress Concern Present (11/19/2022)    [employer] [place] [employer] - Occupational Stress Questionnaire     Feeling of Stress : To some extent   Social Connections: Moderately Isolated (11/19/2022)    Social Connection and Isolation Panel [NHANES]     Frequency of Communication with Friends and Family: Twice a week     Frequency of Social Gatherings with Friends and Family: Once a week     Attends Christianity Services: Never     Active Member of Clubs or Organizations: No     Attends Club or Organization Meetings: Not on file     Marital Status:        Functional Status:  Prior to admission patient needed assistance:   Dependent ADLs:: Independent  Dependent IADLs:: Independent    Care Management Discharge Note    Discharge Date: 10/03/2023       Discharge Disposition: Home Infusion    Discharge Services:      Discharge DME:      Discharge Transportation: family or friend will provide    Private pay costs discussed: insurance costs co-pays and deductibles    Does the patient's insurance plan have a 3 day qualifying hospital stay waiver?  No  Education Provided on the Discharge Plan:  yes  Persons Notified of Discharge Plans: Patient and [facility]  Patient/Family in Agreement with the Plan:      Handoff Referral Completed: Yes          Additional Information:  Consult placed for discharge planning. Patient with  elevated readmission risk. Admitted with acute sepsis and staph epidermidis bacteremia. Patient will require extended course of IV Vancomycin on discharge. 9 more day required per ID notes. Benefit check sent to [facility] this am for home infusion.   Pt has IV abx coverage with [employer] plan-ded $5200 met in  full, 80/20 coverage, OOP $8800 (met in full), coverage is now at 100%.   [facility] liaison has been in touch with patient and will plan on doing a teach this afternoon. PICC line being placed currently.  Patient hoping he may be able to discharge home this afternoon if able to get all coordinated.  Met with patient at the bedside. He has had a PICC in the past so is fairly confident he will do fine with this. Lives at home with his spouse and is independent with all ADLs and IADLs.    Plan for potential discharge home today.    Donna Stuart RN BSN OCN  Care Coordinator  [facility]  [phone]

## 2023-10-02 NOTE — DISCHARGE INSTRUCTIONS
Your home infusion referral was sent to [facility]  If you haven't heard from them regarding supply delivery, nurse visits, or have questions,  Please call them at [phone]

## 2023-10-02 NOTE — PLAN OF CARE
Goal Outcome Evaluation:      Plan of Care Reviewed With: patient      To Do:  End of Shift Summary  For vital signs and complete assessments, please see documentation flowsheets.     Pertinent assessments: Pt A/O. VSS. Denies nausea and SOB. C/o intermittent flank pain when voiding, frequency and urgency. Up ind.  Major Shift Events : none  Treatment Plan: Vanco, IVF and symptom management.  Bedside Nurse: Colleen Rodriguez RN

## 2023-10-02 NOTE — PLAN OF CARE
Goal Outcome Evaluation:      Plan of Care Reviewed With: patient    Overall Patient Progress: improvingOverall Patient Progress: improving     Discharge Note    Patient discharged to home via private vehicle  accompanied by significant other .  IV: Discontinued  Prescriptions  sent to be delivered by home care .   Belongings reviewed and sent with patient.   Home medications returned to patient: NA  Equipment sent with: N/A.   patient verbalizes understanding of discharge instructions. AVS given to patient.  Additional education completed?  New PICC line placed, home infusion RN at bedside to give instructions    Pertinent assessments: Assumed cares 0700- discharg. Pt A&Ox4. VSS except BP slightly elevated. Denies pain, nausea and SOB. C/o intermittent flank pain when voiding, frequency and urgency. Up ind in room, pt care [facility] placed by MD [facility]. PICC line placed by vascular access. Home infusion RN at bedside with education on home vanco infusion thru PICC. Discharge instructions given to pt and understanding indicated.   Major Shift Events : PICC line placed, discharge  Treatment Plan: home vanco thru PICC  Bedside Nurse: LINDA GREENE RN on 10/2/2023 at 5:50 PM

## 2023-10-02 NOTE — DISCHARGE SUMMARY
[facility]    Discharge Summary  Hospitalist    Date of Admission:  9/29/2023  Date of Discharge:  10/2/2023  Discharging Provider: Princess Rivera MD, MD  Date of Service (when I saw the patient): 10/02/23    Discharge Diagnoses      Sepsis secondary to complicated urinary tract infection    Acute kidney injury    Antiphospholipid antibody    SLE and Sjogren's     Blood pressure medication usage     Hyperlipidemia     Anxiety     History of Present Illness   Jim Flores is an 48 year old male who presented with sepsis secondary to complicated urinary tract infection.     Hospital Course   Jim Flores is a 48 year old male with a history of SLE, antiphospholipid antibody on chronic anticoagulation, anxiety, hypertension, nephrolithiasis, PE, GERD and hyperparathyroidism who presents with fever and chills in the context of recent lithotripsy and bilateral ureteral stents with work-up suggestive of a complicated UTI.     Sepsis secondary to complicated urinary tract infection:   Patient had recent urologic procedure with stent placement.  Today is postop day #7 bilateral ureteroscopy with laser lithotripsy/stone basketing and stent exchange.  Patient admitted with urinary tract infection with sepsis.  Blood culture grew Staph epidermidis resistant to clindamycin, erythromycin and oxacillin.  Patient was treated with IV vancomycin.  Infectious disease recommended to continue IV vancomycin for 9 more days.  PICC line was ordered.   Patient will need follow up with urology as outpatient      # Acute kidney injury: Creatinine is slightly elevated from baseline currently at 1.32 with a baseline of 1.1.  Continue fluid resuscitation and sepsis management.     #Antiphospholipid antibody: He is chronically anticoagulated on warfarin which is monitored by his chromogenic factor X level.    -Resumed patient's home regimen of anticoagulation  -Patient recommended to follow up as outpatient with  his hem/onc    #SLE and Sjogren's: He recently experienced a flare and was on higher dose prednisone, currently weaning down and on 12.5 mg daily (this will be ordered for the morning).  He baseline dose is 7.5 mg daily.  We will have a low threshold for stress dose steroids.  We will hold his prior to admission hydroxychloroquine for now given active infection until he completes his treatment.     # Blood pressure medication usage: Has a prescription for propranolol that he was prescribed for anxiety but has not taken this for quite some time.  Was also previously on hydrochlorothiazide for his nephrolithiasis but again has not taken this in quite some time.     # Hyperlipidemia: His liver tests are slightly elevated, hold atorvastatin for now.  -Recheck CMP  -Transaminitis: Could be due to sepsis.  Follow periodically.  He denies any right upper quadrant pain.  -Transaminitis improving, can resume patient's statins     # Anxiety: Continue prior to admission venlafaxine.     Significant Results and Procedures   Results for orders placed or performed during the hospital encounter of 09/29/23   CT Abdomen Pelvis w Contrast    Narrative    EXAM: CT ABDOMEN AND PELVIS WITH CONTRAST  LOCATION: [facility]  DATE/TIME: 9/29/2023 2:24 AM CDT    INDICATION: Recent bilateral ureteral stent placement. Lower abdominal pain, back pain and bilateral flank pain.  COMPARISON: 09/07/2023 - CT chest, abdomen and pelvis.    TECHNIQUE: CT scan of the abdomen and pelvis was performed following injection of IV contrast. Multiplanar reformats were obtained. Dose reduction techniques were used.  CONTRAST: 84 mL Isovue-370.    FINDINGS:    LOWER CHEST: Unremarkable.    HEPATOBILIARY: Unremarkable.    SPLEEN: Unremarkable.    PANCREAS: Unremarkable.    ADRENAL GLANDS: Unremarkable.    KIDNEYS/BLADDER: Bilateral ureteral stents are in place with proximal pigtails in the renal pelvises and distal pigtails in the urinary  bladder lumen. Mild dilatation of bilateral intrarenal collecting systems and ureters. Urothelial enhancement of   bilateral renal pelvises and ureters. Mild perinephric haziness bilaterally. Symmetrical enhancement of both kidneys. At least three nonobstructing calculi in the right kidney, the largest measuring 0.4 cm. Several nonobstructing left renal calculi, the   largest in the inferior pole and measuring 0.7 cm. No ureteral calculi. A few tiny calculi present in the dependent aspect of the urinary bladder lumen.    BOWEL: Normal appendix.    LYMPH NODES: Unremarkable.    PELVIC ORGANS: No acute findings.    MUSCULOSKELETAL: No acute findings.    OTHER: None.      Impression    IMPRESSION:   1.  Bilateral ureteral stents are in place. There is mild dilatation of bilateral intrarenal collecting systems and ureters, suggestive of partial occlusion of the stents.  2.  Urothelial enhancement of bilateral renal pelvises and ureters, suggestive of upper urinary tract infections or inflammation.  3.  No other acute abnormality identified in the abdomen or pelvis.  4.  Several nonobstructing bilateral renal calculi, left kidney more numerous than right.         Pending Results   None    Code Status   Full Code       Primary Care Physician   Good Mooney        Discharge Disposition   Discharged to home  Condition at discharge: Stable    Consultations This Hospital Stay   UROLOGY IP CONSULT  PHARMACY TO DOSE WARFARIN  PHARMACY TO DOSE VANCO  INFECTIOUS DISEASES IP CONSULT  PHARMACY TO DOSE VANCO  PHARMACY TO DOSE VANCO  VASCULAR ACCESS ADULT IP CONSULT  CARE MANAGEMENT / SOCIAL WORK IP CONSULT  CARE MANAGEMENT / SOCIAL WORK IP CONSULT    Time Spent on this Encounter   Princess CORNEJO MD, MD, personally saw the patient today and spent greater than 30 minutes discharging this patient.    Discharge Orders      Primary Care - Care Coordination Referral      Reason for your hospital stay    Follow up with  primary care physician and urology as scheduled  IV antibiotic orders per ID     Follow-up and recommended labs and tests     Follow up with primary care provider, Good Mooney, within 7 days   Follow up with urology as scheduled   Antibiotic orders per ID  Coumadin dosing and INR monitoring per PCP  INR in 2-3 days     Activity    Your activity upon discharge: activity as tolerated     Diet    Follow this diet upon discharge: Orders Placed This Encounter      Gluten Free Diet     Discharge Medications   Current Discharge Medication List        START taking these medications    Details   vancomycin (VANCOCIN) 1500 mg/250 mL IVPB Inject 1,500 mg into the vein every 24 hours for 9 days Weekly CBC/diff, twice weekly creat and vanco trough to Dittes  Qty: 2250 mL, Refills: 0    Associated Diagnoses: Complicated UTI (urinary tract infection)           CONTINUE these medications which have CHANGED    Details   hydroxychloroquine (PLAQUENIL) 200 MG tablet Take 1 tablet (200 mg) by mouth 2 times daily           CONTINUE these medications which have NOT CHANGED    Details   acetaminophen (TYLENOL) 500 MG tablet Take 2 tablets (1,000 mg) by mouth every 6 hours as needed for mild pain  Qty: 100 tablet, Refills: 0    Associated Diagnoses: Bilateral nephrolithiasis      atorvastatin (LIPITOR) 10 MG tablet Take 1 tablet (10 mg) by mouth daily  Qty: 90 tablet, Refills: 4    Associated Diagnoses: Hyperlipidemia LDL goal <130      docusate sodium (COLACE) 100 MG capsule Take 100 mg by mouth 2 times daily as needed for constipation      fluticasone (FLONASE) 50 MCG/ACT nasal spray Spray 1-2 sprays into both nostrils daily as needed for rhinitis or allergies      ketoconazole (NIZORAL) 2 % external shampoo Use topically 1-2 times a week      LORazepam (ATIVAN) 0.5 MG tablet Take 1 tablet (0.5 mg) by mouth every 6 hours as needed for anxiety  Qty: 20 tablet, Refills: 0    Associated Diagnoses: Anxiety state      NONFORMULARY Take 1  capsule by mouth 2 times daily Methyl Guard Plus      Omega-3 Fatty Acids (FISH OIL) 1200 MG CPDR Take 1,200 Units by mouth 2 times daily      oxyBUTYnin (DITROPAN) 5 MG tablet Take 1 tablet (5 mg) by mouth 3 times daily as needed for bladder spasms  Qty: 30 tablet, Refills: 0    Associated Diagnoses: Bilateral nephrolithiasis      oxyCODONE (ROXICODONE) 5 MG tablet Take 1 tablet (5 mg) by mouth every 6 hours as needed for severe pain (IF pain not managed with non-pharmacological and non-opioid interventions)  Qty: 10 tablet, Refills: 0    Associated Diagnoses: Bilateral nephrolithiasis      predniSONE (DELTASONE) 5 MG tablet Take 12.5 mg by mouth daily      propranolol (INDERAL) 10 MG tablet Take 1 tablet (10 mg) by mouth 2 times daily as needed (anxiety)  Qty: 30 tablet, Refills: 2    Associated Diagnoses: Anxiety state      triamcinolone (KENALOG) 0.1 % external cream Apply topically to affected areas 1-2 times a week      venlafaxine (EFFEXOR-ER) 150 MG 24 hr tablet Take 150 mg by mouth 2 times daily      VITAMIN D, CHOLECALCIFEROL, PO Take 1,000 Units by mouth daily      warfarin ANTICOAGULANT (COUMADIN) 5 MG tablet Take by mouth daily , 7.5mg on Wednesdays & 10mg on all other days of the week               Allergies   Allergies   Allergen Reactions    Ampicillin Hives    Bactrim Hives    Ceftazidime     Cefuroxime      Headache, stiff neck and joint pain    Cipro [Ciprofloxacin]      Stiff neck, joint pain, muscle aches    Flomax [Tamsulosin]      Swelling on  face    Ketorolac Tromethamine Itching    Amelia Flavor Unknown     rodolfo fruit gives lip swelling, hives    Penicillins Hives and Unknown    Poison Ivy Extract Unknown    Sulfamethoxazole-Trimethoprim Unknown    Tramadol Itching    Trimethoprim Hives     Data   Most Recent 3 CBC's:  Recent Labs   Lab Test 10/02/23  0700 10/01/23  0722 09/30/23  0711   WBC 6.3 7.1 8.9   HGB 12.6* 12.7* 12.9*   MCV 86 90 88    149* 135*      Most Recent 3  BMP's:  Recent Labs   Lab Test 10/02/23  0700 10/01/23  0722 09/30/23  0711    142 139   POTASSIUM 3.6 3.8 4.1   CHLORIDE 107 108* 106   CO2 24 26 27   BUN 11.5 13.5 14.1   CR 1.26* 1.32* 1.53*   ANIONGAP 10 8 6*   SANDY 8.5* 8.6 8.7   GLC 89 90 95     Most Recent 2 LFT's:  Recent Labs   Lab Test 10/01/23  0722 09/29/23  0102   AST 34 72*   ALT 61 110*   ALKPHOS 45 58   BILITOTAL 0.4 0.5     Most Recent INR's and Anticoagulation Dosing History:  Anticoagulation Dose History  More data exists         Latest Ref Rng & Units 4/12/2022 9/7/2023 9/8/2023 9/29/2023 9/30/2023 10/1/2023 10/2/2023   Recent Dosing and Labs   warfarin ANTICOAGULANT (COUMADIN) half-tab 0.5 mg - - - - - 0.5 mg, $Given - -   warfarin ANTICOAGULANT (COUMADIN) tablet 10 mg - - - - 10 mg, $Given - 5 mg, $Given -   Chromogenic Factor 10 70 - 130 % - - 59  45  - - -   INR 0.85 - 1.15 1.08  1.09  1.35  1.43  2.52  1.74  1.52      Most Recent 3 Troponin's:No lab results found.  Most Recent Cholesterol Panel:  Recent Labs   Lab Test 06/13/23  1150   CHOL 144   LDL 71   HDL 57   TRIG 81     Most Recent 6 Bacteria Isolates From Any Culture (See EPIC Reports for Culture Details):  Recent Labs   Lab Test 07/09/21  1850 03/01/17  1020 11/17/15  1115 11/17/15  1045 11/17/15  0012 11/17/15  0006   CULT No growth <10,000 colonies/mL urogenital lopez Susceptibility testing not routinely done No growth No growth No growth No growth     Most Recent TSH, T4 and A1c Labs:No lab results found.

## 2023-10-02 NOTE — PROGRESS NOTES
Therapy: IV abx  Insurance: [employer]  Ded: $5200  Met: $5200    Co-Insurance: 80/20  Max Out of Pocket: $8800  Met: $8800    In reference to admission on 9/29/23 to check IV abx coverage    Please contact Intake with any questions, [phone] or In Basket pool, [facility] ([phone]).

## 2023-10-02 NOTE — PROGRESS NOTES
[facility]  Infectious Disease Progress Note          Assessment and Plan:   Date of Admission:  9/29/2023  Date of Consult (When I saw the patient): 09/30/23        Assessment & Plan  Jim Flores is a 48 year old who was admitted on 9/29/2023.      Impression: 1 48-year-old male, acute sepsis occurring after urologic procedure with stents in place, Staph epidermidis bacteremia same organism in the urine so likely real, question stents colonized no obvious secondary sites  2 nephrolithiasis recent procedure including stents  3 systemic lupus on immunosuppressive  4 acute renal insufficiency, occurring before any vancomycin  5 prior episode of Enterococcus sepsis years ago from a urinary tract and a Staph aureus skin infection major chronic sinusitis problem this has been stable in recent years  6 allergic reactions mostly rashes to essentially all classes of antibiotics     REC 1 Vanco , creatinine slowly  better and was elevated before the Vanco, need Vanco for the sensitivities now back on the urine anyway, mostly same sensitivities on the blood culture although TCN different.  2 repeat blood cultures neg, temp is down feels better,   3 PICC, 9 days more vanco  at time of stent out or stone removal dose of vanco and ertapenem 1 gr              Interval History:     no new complaints and doing well; no cp, sob, n/v/d, or abd pain.  Feels better no focal symptoms, temp is down.  Blood culture pending including identification repeat cultures neg, urine staph epi is oxacillin resistant as is BC same sens mostly so likely real              Medications:      atorvastatin  10 mg Oral At Bedtime    [Held by provider] hydroxychloroquine  200 mg Oral BID    predniSONE  12.5 mg Oral Daily    vancomycin  1,500 mg Intravenous Q24H    venlafaxine  150 mg Oral BID    Warfarin Therapy Reminder  1 each Oral See Admin Instructions                  Physical Exam:   Blood pressure (!) 146/88, pulse 93,  "temperature 98.6  F (37  C), temperature source Oral, resp. rate 16, height 1.753 m (5' 9\"), weight 75.3 kg (166 lb), SpO2 97 %.  Wt Readings from Last 2 Encounters:   09/29/23 75.3 kg (166 lb)   09/25/23 75.8 kg (167 lb)     Vital Signs with Ranges  Temp:  [97.5  F (36.4  C)-98.9  F (37.2  C)] 98.6  F (37  C)  Pulse:  [65-93] 93  Resp:  [16-20] 16  BP: (124-149)/(62-95) 146/88  SpO2:  [96 %-99 %] 97 %    Constitutional: Awake, alert, cooperative, no apparent distress     Lungs: Clear to auscultation bilaterally, no crackles or wheezing   Cardiovascular: Regular rate and rhythm, normal S1 and S2, and no murmur noted   Abdomen: Normal bowel sounds, soft, non-distended, non-tender   Skin: No rashes, no cyanosis, no edema   Other:           Data:   All microbiology laboratory data reviewed.  Recent Labs   Lab Test 10/02/23  0700 10/01/23  0722 09/30/23  0711   WBC 6.3 7.1 8.9   HGB 12.6* 12.7* 12.9*   HCT 37.8* 39.8* 39.4*   MCV 86 90 88    149* 135*     Recent Labs   Lab Test 10/02/23  0700 10/01/23  0722 09/30/23  0711   CR 1.26* 1.32* 1.53*     Recent Labs   Lab Test 11/28/15  1500   SED 15     Recent Labs   Lab Test 07/09/21  1850 03/01/17  1020 11/17/15  1115 11/17/15  1045 11/17/15  0012 11/17/15  0006 11/16/15  1010 11/16/15  1006 11/14/15  1730   CULT No growth <10,000 colonies/mL urogenital lopez Susceptibility testing not routinely done No growth No growth No growth No growth No growth No growth No growth        "

## 2023-10-02 NOTE — PROGRESS NOTES
[facility]    Received request for benefit check should pt require home IV abx. Pt has IV abx coverage with [facility] plan-ded $5200 met in  full, 80/20 coverage, OOP $8800 (met in full), coverage is now at 100%.    I spoke with pt to introduce home infusion services and review benefits. He would like to proceed with [facility] for home IV abx needs. I will see him today around 1400h for IV teaching.     Thank you     Liliana Duffy RN  [facility] Liaison  [phone] (Mon thru Fri 8am - 5pm)  [phone] Office

## 2023-10-02 NOTE — PROGRESS NOTES
Home Infusion  Jim will be discharging today and going home on IV vancomycin.  I met with Jim at bedside and instructed in IV administration via PICC line with SAS flushing.   Had Jim perform hands on with practice equipment and teaching sheets. Provided information about supplies and supply delivery, storage of medication, checking of label, dosing times, plan for SNV and 24/7 availability of [facility] staff. [facility] will follow for home RN.   Jim verbalized understanding of information given. He demonstrated very good technique with practice and verbalized good understanding of process.  Stated he feels comfortable with administering abx in the home setting.   Dosing schedule: Pt will continue to dose around 1130 in the home setting.   Delivery: Medication and supplies will be delivered to pt's home tonight.  Jim is ready for discharge from [facility] perspective.    Liliana Duffy RN  [facility] Liaison  [phone] (M-F 8a-5p)  [phone] Office

## 2023-10-03 ENCOUNTER — PATIENT OUTREACH (OUTPATIENT)
Dept: CARE COORDINATION | Facility: CLINIC | Age: 48
End: 2023-10-03
Payer: COMMERCIAL

## 2023-10-03 ENCOUNTER — TELEPHONE (OUTPATIENT)
Dept: UROLOGY | Facility: CLINIC | Age: 48
End: 2023-10-03
Payer: COMMERCIAL

## 2023-10-03 NOTE — PROGRESS NOTES
Clinic Care Coordination Contact  [facility]/Voicemail    Referral Source: IP Handoff  Clinical Data: Care Coordinator Outreach  Outreach attempted x 1.  Left message on patient's voicemail with call back information and requested return call.  Plan: Care Coordinator will try to reach patient again in 1-2 business days.    Kemi Howell, RN Care Coordinator  [facility]  Email: [email or web address]  Phone: [phone]

## 2023-10-03 NOTE — PROCEDURES
[facility]    Single Lumen PICC Placement    Date/Time: 10/2/2023 8:19 PM    Performed by: Kriss Navarrete RN  Authorized by: Souleymane Hamm MD  Indications: vascular access      UNIVERSAL PROTOCOL   Site Marked: Yes  Prior Images Obtained and Reviewed:  Yes  Required items: Required blood products, implants, devices and special equipment available    Patient identity confirmed:  Verbally with patient, hospital-assigned identification number, arm band and provided demographic data  NA - No sedation, light sedation, or local anesthesia  Confirmation Checklist:  Patient's identity using two indicators, relevant allergies, procedure was appropriate and matched the consent or emergent situation and correct equipment/implants were available  Time out: Immediately prior to the procedure a time out was called (bedside nurse)    Universal Protocol: the Joint Commission Universal Protocol was followed    Preparation: Patient was prepped and draped in usual sterile fashion       ANESTHESIA    Local Anesthetic:  Lidocaine 2% without epinephrine  Anesthetic Total (mL):  2.5      SEDATION    Patient Sedated: No        Preparation: skin prepped with ChloraPrep  Skin prep agent: skin prep agent completely dried prior to procedure  Sterile barriers: maximum sterile barriers were used: cap, mask, sterile gown, sterile gloves, and large sterile sheet  Hand hygiene: hand hygiene performed prior to central venous catheter insertion  Type of line used: PICC  Catheter type: single lumen  Lumen type: power PICC and non-valved  Catheter size: 4 Fr  Brand: Bard  Lot number: [id number]  Placement method: venipuncture, ultrasound, tip navigation system and MST  Number of attempts: 1  Difficulty threading catheter: no  Successful placement: yes  Orientation: right  : 5.8mm.  Location: brachial vein (medial)  Tip Location: SVC/RA Junction  Arm circumference: adults 10 cm  Extremity circumference: 30  Visible  catheter length: 0  Total catheter length: 39  Dressing and securement: adhesive securement device, alcohol impregnated caps, blood removed, blood cleaned with CHG, dressing applied, gloves changed prior to final dressing, statlock, site cleansed, thrombin hemostasis patch applied, transparent dressing and sterile dressing applied  Post procedure assessment: blood return through all ports, placement verified by 3CG technology and free fluid flow  PROCEDURE   Patient Tolerance:  Patient tolerated the procedure well with no immediate complications   Single lumen PICC on right arm ok to use as verified using 3cg technology. The PICC was first attempted on the left cephalic  vein and the picc line would not advance past the midline and drop to svc multiple attempts made and maneuvers to accomplish placement . Once obvious it was not going to go past that point , procedure stopped and was done on right side with new kit and under all new sterile materials . VAT nurse called back to see pt. Due to scant blood under dressing  , with an abundance of caution a new dressing was applied and stat seal was placed .   Disposal: sharps and needle count correct at the end of procedure, needles and guidewire disposed in sharps container

## 2023-10-03 NOTE — LETTER
[facility] CARE COORDINATION  [address] [place] [address]     October 4, 2023    Jim Flores  [address] [place] [address]      Dear Jim,    I am a  clinic care coordinator who works with Good Mooney MD with the [facility]. I recently tried to call and was unable to reach you. Below is a description of clinic care coordination and how I can further assist you.       The clinic care coordination team is made up of a registered nurse, , financial resource worker and community health worker who understand the health care system. The goal of clinic care coordination is to help you manage your health and improve access to the health care system. Our team works alongside your provider to assist you in determining your health and social needs. We can help you obtain health care and community resources, providing you with necessary information and education. We can work with you through any barriers and develop a care plan that helps coordinate and strengthen the communication between you and your care team.  Our services are voluntary and are offered without charge to you personally.    Please feel free to contact me with any questions or concerns regarding care coordination and what we can offer.      We are focused on providing you with the highest-quality healthcare experience possible.    Sincerely,     Kemi Howell RN Care Coordinator  [facility] [place], [place], [place]  Email: [email or web address]  Phone: [phone]

## 2023-10-04 ENCOUNTER — LAB REQUISITION (OUTPATIENT)
Dept: LAB | Facility: CLINIC | Age: 48
End: 2023-10-04
Payer: COMMERCIAL

## 2023-10-04 DIAGNOSIS — R65.20 SEVERE SEPSIS WITHOUT SEPTIC SHOCK (CODE) (H): ICD-10-CM

## 2023-10-04 LAB
BACTERIA BLD CULT: NO GROWTH
BASO+EOS+MONOS # BLD AUTO: NORMAL 10*3/UL
BASO+EOS+MONOS NFR BLD AUTO: NORMAL %
BASOPHILS # BLD AUTO: 0.1 10E3/UL (ref 0–0.2)
BASOPHILS NFR BLD AUTO: 1 %
CREAT SERPL-MCNC: 1.25 MG/DL (ref 0.67–1.17)
EGFRCR SERPLBLD CKD-EPI 2021: 70 ML/MIN/1.73M2
EOSINOPHIL # BLD AUTO: 0.2 10E3/UL (ref 0–0.7)
EOSINOPHIL NFR BLD AUTO: 3 %
ERYTHROCYTE [DISTWIDTH] IN BLOOD BY AUTOMATED COUNT: 13.2 % (ref 10–15)
HCT VFR BLD AUTO: 41.5 % (ref 40–53)
HGB BLD-MCNC: 14.3 G/DL (ref 13.3–17.7)
IMM GRANULOCYTES # BLD: 0.1 10E3/UL
IMM GRANULOCYTES NFR BLD: 1 %
LYMPHOCYTES # BLD AUTO: 1 10E3/UL (ref 0.8–5.3)
LYMPHOCYTES NFR BLD AUTO: 15 %
MCH RBC QN AUTO: 28.9 PG (ref 26.5–33)
MCHC RBC AUTO-ENTMCNC: 34.5 G/DL (ref 31.5–36.5)
MCV RBC AUTO: 84 FL (ref 78–100)
MONOCYTES # BLD AUTO: 0.8 10E3/UL (ref 0–1.3)
MONOCYTES NFR BLD AUTO: 12 %
NEUTROPHILS # BLD AUTO: 4.5 10E3/UL (ref 1.6–8.3)
NEUTROPHILS NFR BLD AUTO: 68 %
NRBC # BLD AUTO: 0 10E3/UL
NRBC BLD AUTO-RTO: 0 /100
PLATELET # BLD AUTO: 248 10E3/UL (ref 150–450)
RBC # BLD AUTO: 4.95 10E6/UL (ref 4.4–5.9)
VANCOMYCIN SERPL-MCNC: 8.4 UG/ML
WBC # BLD AUTO: 6.7 10E3/UL (ref 4–11)

## 2023-10-04 PROCEDURE — 82565 ASSAY OF CREATININE: CPT | Performed by: INTERNAL MEDICINE

## 2023-10-04 PROCEDURE — 85025 COMPLETE CBC W/AUTO DIFF WBC: CPT | Performed by: INTERNAL MEDICINE

## 2023-10-04 PROCEDURE — 80202 ASSAY OF VANCOMYCIN: CPT | Performed by: INTERNAL MEDICINE

## 2023-10-04 NOTE — PROGRESS NOTES
Clinic Care Coordination Contact  [facility]/Voicemail    Referral Source: IP Handoff  Clinical Data: Care Coordinator Outreach  Outreach attempted x 2.  Left message on patient's voicemail with call back information and requested return call.  Plan: Care Coordinator will send care coordination introduction letter with care coordinator contact information and explanation of care coordination services via [facility]. Care Coordinator will do no further outreaches at this time.    Kemi Howell RN Care Coordinator  [facility]  Email: [email or web address]  Phone: [phone]

## 2023-10-06 ENCOUNTER — LAB REQUISITION (OUTPATIENT)
Dept: LAB | Facility: CLINIC | Age: 48
End: 2023-10-06
Payer: COMMERCIAL

## 2023-10-06 DIAGNOSIS — R65.20 SEVERE SEPSIS WITHOUT SEPTIC SHOCK (CODE) (H): ICD-10-CM

## 2023-10-06 LAB
BACTERIA BLD CULT: NO GROWTH
BACTERIA BLD CULT: NO GROWTH
CREAT SERPL-MCNC: 1.24 MG/DL (ref 0.67–1.17)
EGFRCR SERPLBLD CKD-EPI 2021: 71 ML/MIN/1.73M2
VANCOMYCIN SERPL-MCNC: 10.4 UG/ML

## 2023-10-06 PROCEDURE — 80202 ASSAY OF VANCOMYCIN: CPT | Performed by: INTERNAL MEDICINE

## 2023-10-06 PROCEDURE — 82565 ASSAY OF CREATININE: CPT | Performed by: INTERNAL MEDICINE

## 2023-10-09 ENCOUNTER — LAB REQUISITION (OUTPATIENT)
Dept: LAB | Facility: CLINIC | Age: 48
End: 2023-10-09
Payer: COMMERCIAL

## 2023-10-09 DIAGNOSIS — R65.20 SEVERE SEPSIS WITHOUT SEPTIC SHOCK (CODE) (H): ICD-10-CM

## 2023-10-09 LAB
BASO+EOS+MONOS # BLD AUTO: ABNORMAL 10*3/UL
BASO+EOS+MONOS NFR BLD AUTO: ABNORMAL %
BASOPHILS # BLD AUTO: 0.1 10E3/UL (ref 0–0.2)
BASOPHILS NFR BLD AUTO: 1 %
CREAT SERPL-MCNC: 1.29 MG/DL (ref 0.67–1.17)
EGFRCR SERPLBLD CKD-EPI 2021: 68 ML/MIN/1.73M2
EOSINOPHIL # BLD AUTO: 0.1 10E3/UL (ref 0–0.7)
EOSINOPHIL NFR BLD AUTO: 1 %
ERYTHROCYTE [DISTWIDTH] IN BLOOD BY AUTOMATED COUNT: 13.3 % (ref 10–15)
HCT VFR BLD AUTO: 42.3 % (ref 40–53)
HGB BLD-MCNC: 14.4 G/DL (ref 13.3–17.7)
IMM GRANULOCYTES # BLD: 0.1 10E3/UL
IMM GRANULOCYTES NFR BLD: 1 %
LYMPHOCYTES # BLD AUTO: 0.7 10E3/UL (ref 0.8–5.3)
LYMPHOCYTES NFR BLD AUTO: 6 %
MCH RBC QN AUTO: 28.6 PG (ref 26.5–33)
MCHC RBC AUTO-ENTMCNC: 34 G/DL (ref 31.5–36.5)
MCV RBC AUTO: 84 FL (ref 78–100)
MONOCYTES # BLD AUTO: 0.6 10E3/UL (ref 0–1.3)
MONOCYTES NFR BLD AUTO: 5 %
NEUTROPHILS # BLD AUTO: 9.4 10E3/UL (ref 1.6–8.3)
NEUTROPHILS NFR BLD AUTO: 86 %
NRBC # BLD AUTO: 0 10E3/UL
NRBC BLD AUTO-RTO: 0 /100
PLATELET # BLD AUTO: 330 10E3/UL (ref 150–450)
RBC # BLD AUTO: 5.03 10E6/UL (ref 4.4–5.9)
WBC # BLD AUTO: 10.9 10E3/UL (ref 4–11)

## 2023-10-09 PROCEDURE — 85014 HEMATOCRIT: CPT | Performed by: INTERNAL MEDICINE

## 2023-10-09 PROCEDURE — 85048 AUTOMATED LEUKOCYTE COUNT: CPT | Performed by: INTERNAL MEDICINE

## 2023-10-09 PROCEDURE — 82565 ASSAY OF CREATININE: CPT | Performed by: INTERNAL MEDICINE

## 2023-10-10 ENCOUNTER — ANESTHESIA (OUTPATIENT)
Dept: SURGERY | Facility: CLINIC | Age: 48
End: 2023-10-10
Payer: COMMERCIAL

## 2023-10-10 ENCOUNTER — APPOINTMENT (OUTPATIENT)
Dept: GENERAL RADIOLOGY | Facility: CLINIC | Age: 48
End: 2023-10-10
Attending: STUDENT IN AN ORGANIZED HEALTH CARE EDUCATION/TRAINING PROGRAM
Payer: COMMERCIAL

## 2023-10-10 ENCOUNTER — HOSPITAL ENCOUNTER (OUTPATIENT)
Facility: CLINIC | Age: 48
Discharge: HOME | End: 2023-10-10
Attending: STUDENT IN AN ORGANIZED HEALTH CARE EDUCATION/TRAINING PROGRAM | Admitting: STUDENT IN AN ORGANIZED HEALTH CARE EDUCATION/TRAINING PROGRAM
Payer: COMMERCIAL

## 2023-10-10 ENCOUNTER — ANESTHESIA EVENT (OUTPATIENT)
Dept: SURGERY | Facility: CLINIC | Age: 48
End: 2023-10-10
Payer: COMMERCIAL

## 2023-10-10 VITALS
BODY MASS INDEX: 24.29 KG/M2 | RESPIRATION RATE: 16 BRPM | TEMPERATURE: 97.7 F | SYSTOLIC BLOOD PRESSURE: 143 MMHG | WEIGHT: 164 LBS | DIASTOLIC BLOOD PRESSURE: 99 MMHG | OXYGEN SATURATION: 100 % | HEIGHT: 69 IN | HEART RATE: 91 BPM

## 2023-10-10 DIAGNOSIS — N20.0 BILATERAL NEPHROLITHIASIS: ICD-10-CM

## 2023-10-10 PROCEDURE — 82365 CALCULUS SPECTROSCOPY: CPT | Performed by: STUDENT IN AN ORGANIZED HEALTH CARE EDUCATION/TRAINING PROGRAM

## 2023-10-10 PROCEDURE — C1769 GUIDE WIRE: HCPCS | Performed by: STUDENT IN AN ORGANIZED HEALTH CARE EDUCATION/TRAINING PROGRAM

## 2023-10-10 PROCEDURE — 250N000011 HC RX IP 250 OP 636: Mod: JZ | Performed by: STUDENT IN AN ORGANIZED HEALTH CARE EDUCATION/TRAINING PROGRAM

## 2023-10-10 PROCEDURE — 255N000002 HC RX 255 OP 636: Mod: JZ | Performed by: STUDENT IN AN ORGANIZED HEALTH CARE EDUCATION/TRAINING PROGRAM

## 2023-10-10 PROCEDURE — 250N000025 HC SEVOFLURANE, PER MIN: Performed by: STUDENT IN AN ORGANIZED HEALTH CARE EDUCATION/TRAINING PROGRAM

## 2023-10-10 PROCEDURE — 999N000141 HC STATISTIC PRE-PROCEDURE NURSING ASSESSMENT: Performed by: STUDENT IN AN ORGANIZED HEALTH CARE EDUCATION/TRAINING PROGRAM

## 2023-10-10 PROCEDURE — 272N000002 HC OR SUPPLY OTHER OPNP: Performed by: STUDENT IN AN ORGANIZED HEALTH CARE EDUCATION/TRAINING PROGRAM

## 2023-10-10 PROCEDURE — 250N000013 HC RX MED GY IP 250 OP 250 PS 637: Performed by: ANESTHESIOLOGY

## 2023-10-10 PROCEDURE — 250N000009 HC RX 250: Performed by: STUDENT IN AN ORGANIZED HEALTH CARE EDUCATION/TRAINING PROGRAM

## 2023-10-10 PROCEDURE — 999N000179 XR SURGERY CARM FLUORO LESS THAN 5 MIN W STILLS: Mod: TC

## 2023-10-10 PROCEDURE — 258N000003 HC RX IP 258 OP 636: Performed by: ANESTHESIOLOGY

## 2023-10-10 PROCEDURE — 250N000009 HC RX 250: Performed by: ANESTHESIOLOGY

## 2023-10-10 PROCEDURE — 74420 UROGRAPHY RTRGR +-KUB: CPT | Mod: 26 | Performed by: STUDENT IN AN ORGANIZED HEALTH CARE EDUCATION/TRAINING PROGRAM

## 2023-10-10 PROCEDURE — 258N000001 HC RX 258: Performed by: STUDENT IN AN ORGANIZED HEALTH CARE EDUCATION/TRAINING PROGRAM

## 2023-10-10 PROCEDURE — 52356 CYSTO/URETERO W/LITHOTRIPSY: CPT | Mod: 50 | Performed by: STUDENT IN AN ORGANIZED HEALTH CARE EDUCATION/TRAINING PROGRAM

## 2023-10-10 PROCEDURE — 250N000011 HC RX IP 250 OP 636: Performed by: ANESTHESIOLOGY

## 2023-10-10 PROCEDURE — C1894 INTRO/SHEATH, NON-LASER: HCPCS | Performed by: STUDENT IN AN ORGANIZED HEALTH CARE EDUCATION/TRAINING PROGRAM

## 2023-10-10 PROCEDURE — 272N000001 HC OR GENERAL SUPPLY STERILE: Performed by: STUDENT IN AN ORGANIZED HEALTH CARE EDUCATION/TRAINING PROGRAM

## 2023-10-10 PROCEDURE — 250N000009 HC RX 250

## 2023-10-10 PROCEDURE — 250N000011 HC RX IP 250 OP 636

## 2023-10-10 PROCEDURE — C2617 STENT, NON-COR, TEM W/O DEL: HCPCS | Performed by: STUDENT IN AN ORGANIZED HEALTH CARE EDUCATION/TRAINING PROGRAM

## 2023-10-10 PROCEDURE — 710N000012 HC RECOVERY PHASE 2, PER MINUTE: Performed by: STUDENT IN AN ORGANIZED HEALTH CARE EDUCATION/TRAINING PROGRAM

## 2023-10-10 PROCEDURE — 710N000009 HC RECOVERY PHASE 1, LEVEL 1, PER MIN: Performed by: STUDENT IN AN ORGANIZED HEALTH CARE EDUCATION/TRAINING PROGRAM

## 2023-10-10 PROCEDURE — 370N000017 HC ANESTHESIA TECHNICAL FEE, PER MIN: Performed by: STUDENT IN AN ORGANIZED HEALTH CARE EDUCATION/TRAINING PROGRAM

## 2023-10-10 PROCEDURE — 360N000084 HC SURGERY LEVEL 4 W/ FLUORO, PER MIN: Performed by: STUDENT IN AN ORGANIZED HEALTH CARE EDUCATION/TRAINING PROGRAM

## 2023-10-10 DEVICE — STENT URETERAL POLARIS ULTRA 6FRX26CM M0061921330: Type: IMPLANTABLE DEVICE | Site: URETER | Status: FUNCTIONAL

## 2023-10-10 RX ORDER — LIDOCAINE 40 MG/G
CREAM TOPICAL
Status: DISCONTINUED | OUTPATIENT
Start: 2023-10-10 | End: 2023-10-10 | Stop reason: HOSPADM

## 2023-10-10 RX ORDER — FENTANYL CITRATE 50 UG/ML
INJECTION, SOLUTION INTRAMUSCULAR; INTRAVENOUS PRN
Status: DISCONTINUED | OUTPATIENT
Start: 2023-10-10 | End: 2023-10-10

## 2023-10-10 RX ORDER — METOPROLOL TARTRATE 1 MG/ML
2.5 INJECTION, SOLUTION INTRAVENOUS EVERY 5 MIN PRN
Status: DISCONTINUED | OUTPATIENT
Start: 2023-10-10 | End: 2023-10-10 | Stop reason: HOSPADM

## 2023-10-10 RX ORDER — HYDROMORPHONE HCL IN WATER/PF 6 MG/30 ML
0.4 PATIENT CONTROLLED ANALGESIA SYRINGE INTRAVENOUS EVERY 5 MIN PRN
Status: DISCONTINUED | OUTPATIENT
Start: 2023-10-10 | End: 2023-10-10 | Stop reason: HOSPADM

## 2023-10-10 RX ORDER — SODIUM CHLORIDE, SODIUM LACTATE, POTASSIUM CHLORIDE, CALCIUM CHLORIDE 600; 310; 30; 20 MG/100ML; MG/100ML; MG/100ML; MG/100ML
INJECTION, SOLUTION INTRAVENOUS CONTINUOUS
Status: DISCONTINUED | OUTPATIENT
Start: 2023-10-10 | End: 2023-10-10 | Stop reason: HOSPADM

## 2023-10-10 RX ORDER — ONDANSETRON 2 MG/ML
4 INJECTION INTRAMUSCULAR; INTRAVENOUS EVERY 30 MIN PRN
Status: DISCONTINUED | OUTPATIENT
Start: 2023-10-10 | End: 2023-10-10 | Stop reason: HOSPADM

## 2023-10-10 RX ORDER — ONDANSETRON 4 MG/1
4 TABLET, ORALLY DISINTEGRATING ORAL EVERY 30 MIN PRN
Status: DISCONTINUED | OUTPATIENT
Start: 2023-10-10 | End: 2023-10-10 | Stop reason: HOSPADM

## 2023-10-10 RX ORDER — LABETALOL HYDROCHLORIDE 5 MG/ML
10 INJECTION, SOLUTION INTRAVENOUS
Status: DISCONTINUED | OUTPATIENT
Start: 2023-10-10 | End: 2023-10-10 | Stop reason: HOSPADM

## 2023-10-10 RX ORDER — OXYCODONE HYDROCHLORIDE 5 MG/1
5 TABLET ORAL EVERY 6 HOURS PRN
Qty: 6 TABLET | Refills: 0 | Status: SHIPPED | OUTPATIENT
Start: 2023-10-10

## 2023-10-10 RX ORDER — PROPOFOL 10 MG/ML
INJECTION, EMULSION INTRAVENOUS PRN
Status: DISCONTINUED | OUTPATIENT
Start: 2023-10-10 | End: 2023-10-10

## 2023-10-10 RX ORDER — OXYCODONE HYDROCHLORIDE 5 MG/1
5 TABLET ORAL
Status: COMPLETED | OUTPATIENT
Start: 2023-10-10 | End: 2023-10-10

## 2023-10-10 RX ORDER — FENTANYL CITRATE 50 UG/ML
50 INJECTION, SOLUTION INTRAMUSCULAR; INTRAVENOUS EVERY 5 MIN PRN
Status: DISCONTINUED | OUTPATIENT
Start: 2023-10-10 | End: 2023-10-10 | Stop reason: HOSPADM

## 2023-10-10 RX ORDER — ALBUTEROL SULFATE 0.83 MG/ML
2.5 SOLUTION RESPIRATORY (INHALATION) EVERY 4 HOURS PRN
Status: DISCONTINUED | OUTPATIENT
Start: 2023-10-10 | End: 2023-10-10 | Stop reason: HOSPADM

## 2023-10-10 RX ORDER — LIDOCAINE HYDROCHLORIDE 20 MG/ML
INJECTION, SOLUTION INFILTRATION; PERINEURAL PRN
Status: DISCONTINUED | OUTPATIENT
Start: 2023-10-10 | End: 2023-10-10

## 2023-10-10 RX ORDER — FENTANYL CITRATE 50 UG/ML
25 INJECTION, SOLUTION INTRAMUSCULAR; INTRAVENOUS EVERY 5 MIN PRN
Status: DISCONTINUED | OUTPATIENT
Start: 2023-10-10 | End: 2023-10-10 | Stop reason: HOSPADM

## 2023-10-10 RX ORDER — HYDROMORPHONE HCL IN WATER/PF 6 MG/30 ML
0.2 PATIENT CONTROLLED ANALGESIA SYRINGE INTRAVENOUS EVERY 5 MIN PRN
Status: DISCONTINUED | OUTPATIENT
Start: 2023-10-10 | End: 2023-10-10 | Stop reason: HOSPADM

## 2023-10-10 RX ORDER — ONDANSETRON 2 MG/ML
INJECTION INTRAMUSCULAR; INTRAVENOUS PRN
Status: DISCONTINUED | OUTPATIENT
Start: 2023-10-10 | End: 2023-10-10

## 2023-10-10 RX ORDER — HYDRALAZINE HYDROCHLORIDE 20 MG/ML
2.5-5 INJECTION INTRAMUSCULAR; INTRAVENOUS EVERY 10 MIN PRN
Status: DISCONTINUED | OUTPATIENT
Start: 2023-10-10 | End: 2023-10-10 | Stop reason: HOSPADM

## 2023-10-10 RX ORDER — GLYCOPYRROLATE 0.2 MG/ML
INJECTION, SOLUTION INTRAMUSCULAR; INTRAVENOUS PRN
Status: DISCONTINUED | OUTPATIENT
Start: 2023-10-10 | End: 2023-10-10

## 2023-10-10 RX ORDER — DOCUSATE SODIUM 100 MG/1
100 CAPSULE, LIQUID FILLED ORAL 2 TIMES DAILY PRN
Qty: 30 CAPSULE | Refills: 0 | Status: SHIPPED | OUTPATIENT
Start: 2023-10-10

## 2023-10-10 RX ORDER — OXYCODONE HYDROCHLORIDE 5 MG/1
10 TABLET ORAL
Status: DISCONTINUED | OUTPATIENT
Start: 2023-10-10 | End: 2023-10-10 | Stop reason: HOSPADM

## 2023-10-10 RX ORDER — ERTAPENEM 1 G/1
1 INJECTION, POWDER, LYOPHILIZED, FOR SOLUTION INTRAMUSCULAR; INTRAVENOUS ONCE
Status: COMPLETED | OUTPATIENT
Start: 2023-10-10 | End: 2023-10-10

## 2023-10-10 RX ORDER — DEXAMETHASONE SODIUM PHOSPHATE 4 MG/ML
INJECTION, SOLUTION INTRA-ARTICULAR; INTRALESIONAL; INTRAMUSCULAR; INTRAVENOUS; SOFT TISSUE PRN
Status: DISCONTINUED | OUTPATIENT
Start: 2023-10-10 | End: 2023-10-10

## 2023-10-10 RX ADMIN — MIDAZOLAM 2 MG: 1 INJECTION INTRAMUSCULAR; INTRAVENOUS at 12:08

## 2023-10-10 RX ADMIN — FENTANYL CITRATE 25 MCG: 50 INJECTION, SOLUTION INTRAMUSCULAR; INTRAVENOUS at 14:28

## 2023-10-10 RX ADMIN — LIDOCAINE HYDROCHLORIDE 50 MG: 20 INJECTION, SOLUTION INFILTRATION; PERINEURAL at 12:10

## 2023-10-10 RX ADMIN — PROPOFOL 200 MG: 10 INJECTION, EMULSION INTRAVENOUS at 12:10

## 2023-10-10 RX ADMIN — ERTAPENEM SODIUM 1 G: 1 INJECTION, POWDER, LYOPHILIZED, FOR SOLUTION INTRAMUSCULAR; INTRAVENOUS at 11:13

## 2023-10-10 RX ADMIN — FENTANYL CITRATE 100 MCG: 50 INJECTION INTRAMUSCULAR; INTRAVENOUS at 12:10

## 2023-10-10 RX ADMIN — FENTANYL CITRATE 50 MCG: 50 INJECTION, SOLUTION INTRAMUSCULAR; INTRAVENOUS at 14:14

## 2023-10-10 RX ADMIN — OXYCODONE HYDROCHLORIDE 5 MG: 5 TABLET ORAL at 14:34

## 2023-10-10 RX ADMIN — GLYCOPYRROLATE 0.2 MG: 0.2 INJECTION, SOLUTION INTRAMUSCULAR; INTRAVENOUS at 12:34

## 2023-10-10 RX ADMIN — METOPROLOL TARTRATE 2.5 MG: 5 INJECTION INTRAVENOUS at 14:23

## 2023-10-10 RX ADMIN — SODIUM CHLORIDE, POTASSIUM CHLORIDE, SODIUM LACTATE AND CALCIUM CHLORIDE: 600; 310; 30; 20 INJECTION, SOLUTION INTRAVENOUS at 11:12

## 2023-10-10 RX ADMIN — FENTANYL CITRATE 50 MCG: 50 INJECTION INTRAMUSCULAR; INTRAVENOUS at 13:38

## 2023-10-10 RX ADMIN — ONDANSETRON 4 MG: 2 INJECTION INTRAMUSCULAR; INTRAVENOUS at 12:34

## 2023-10-10 RX ADMIN — DEXAMETHASONE SODIUM PHOSPHATE 4 MG: 4 INJECTION, SOLUTION INTRA-ARTICULAR; INTRALESIONAL; INTRAMUSCULAR; INTRAVENOUS; SOFT TISSUE at 12:10

## 2023-10-10 ASSESSMENT — LIFESTYLE VARIABLES: TOBACCO_USE: 0

## 2023-10-10 ASSESSMENT — ENCOUNTER SYMPTOMS
DYSRHYTHMIAS: 0
SEIZURES: 0

## 2023-10-10 ASSESSMENT — ACTIVITIES OF DAILY LIVING (ADL)
ADLS_ACUITY_SCORE: 37

## 2023-10-10 NOTE — ANESTHESIA PROCEDURE NOTES
Airway       Patient location during procedure: OR  Staff -        CRNA: Damari Guevara APRN CRNA       Performed By: CRNA  Consent for Airway        Urgency: elective  Indications and Patient Condition       Indications for airway management: javed-procedural       Induction type:intravenous       Mask difficulty assessment: 0 - not attempted    Final Airway Details       Final airway type: supraglottic airway    Supraglottic Airway Details        Type: LMA       Brand: I-Gel       LMA size: 5    Post intubation assessment        Placement verified by: capnometry, equal breath sounds and chest rise        Number of attempts at approach: 1       Number of other approaches attempted: 0       Secured with: commercial tube young       Ease of procedure: easy       Dentition: Unchanged

## 2023-10-10 NOTE — PROGRESS NOTES
Ok to use PICC per Dr. Cantor. Documentation of PICC placement in epic, catheter length at previously charted site, blood return noted.

## 2023-10-10 NOTE — DISCHARGE INSTRUCTIONS
POSTOPERATIVE INSTRUCTIONS    Diagnosis-------------------------------   Bilateral nephrolithiasis    Procedure-------------------------------  Procedure(s) (LRB):  Cystoscopy, bilateral ureteroscopy with laser lithotripsy, bilateral ureteroscopy with stone basketing, bilateral retrograde pyelogram, bilateral ureteral stent exchange (Bilateral)      Findings--------------------------------  Left side: numerous 3-5 mm fragments especially in the lower pole but also in upper pole and interpolar calyces, as well as smaller amount of dust; basketed and further laser dusted, 98% stone free    Right side: a few 3-4 mm fragments as well as additional dust in lower pole, basketed, dusted, 98% stone free    Home-going instructions-----------------         Activity Limitation:     - No driving or operating heavy machinery while on narcotic pain medication.     FOLLOW THESE INSTRUCTIONS AS INDICATED BELOW:  - Observe operative area for signs of excessive bleeding.  - You may shower.  - Increase fluid intake to promote clear urine.  - Resume usual diet as tolerated    What to expect while recovering-----------  - You may experience some intermittent bleeding that makes your urine pink or cherry colored. This is normal.  - However, if you are unable to urinate, passing large amount of clots, have meghan blood in your urine, or have a temperature >101 degrees, call the urology nurse on call, or present to your nearest emergency department.  - You are encouraged to walk daily, and have no activity restrictions.   - A URETERAL STENT has been placed that allows urine to flow unobstructed from your kidney into your bladder.  The stent has a curl in the kidney and a curl in the bladder.  The curl in the bladder can cause some urgency and frequency of urination as well as some mild blood in the urine.  The curl in the kidney can cause some mild flank discomfort.  This may be more noticeable when you urinate.  A URETERAL STENT is meant  to be left in temporarily.  It must be removed or changed no later than 3 months after its insertion.  If it's not removed it can result in stone overgrowth on the stent that can cause pain, infection, and can be very difficult to remove.      STENT REMOVAL   On 10/13/2023 morning grasps the black strings which are protruding from the tip of your penis. With a smooth slow and gentle pull, remove the strings. Attached to each string should be a blue and white plastic tube with curls on both ends. If you pull the black string and only the string comes out, you will need to contact the office to schedule a cystoscopy and stent removal.     Discharge Medications/instructions:   - Take Tylenol 1000mg every 6 hours for pain  - Take Oxycodone 5mg every 6 hours only for break through pain  - Take Colace while taking Oxycodone to prevent constipation      Questions/concerns------------------------  [facility]: [phone]    Future appointments  Follow up in 3 months with melanie alanis, renal ultrasound      Sheng Barrientos MD       GENERAL ANESTHESIA OR SEDATION ADULT DISCHARGE INSTRUCTIONS   SPECIAL PRECAUTIONS FOR 24 HOURS AFTER SURGERY    IT IS NOT UNUSUAL TO FEEL LIGHT-HEADED OR FAINT, UP TO 24 HOURS AFTER SURGERY OR WHILE TAKING PAIN MEDICATION.  IF YOU HAVE THESE SYMPTOMS; SIT FOR A FEW MINUTES BEFORE STANDING AND HAVE SOMEONE ASSIST YOU WHEN YOU GET UP TO WALK OR USE THE BATHROOM.    YOU SHOULD REST AND RELAX FOR THE NEXT 24 HOURS AND YOU MUST MAKE ARRANGEMENTS TO HAVE SOMEONE STAY WITH YOU FOR AT LEAST 24 HOURS AFTER YOUR DISCHARGE.  AVOID HAZARDOUS AND STRENUOUS ACTIVITIES.  DO NOT MAKE IMPORTANT DECISIONS FOR 24 HOURS.    DO NOT DRIVE ANY VEHICLE OR OPERATE MECHANICAL EQUIPMENT FOR 24 HOURS FOLLOWING THE END OF YOUR SURGERY.  EVEN THOUGH YOU MAY FEEL NORMAL, YOUR REACTIONS MAY BE AFFECTED BY THE MEDICATION YOU HAVE RECEIVED.    DO NOT DRINK ALCOHOLIC BEVERAGES FOR 24 HOURS FOLLOWING  YOUR SURGERY.    DRINK CLEAR LIQUIDS (APPLE JUICE, GINGER ALE, 7-UP, BROTH, ETC.).  PROGRESS TO YOUR REGULAR DIET AS YOU FEEL ABLE.    YOU MAY HAVE A DRY MOUTH, A SORE THROAT, MUSCLES ACHES OR TROUBLE SLEEPING.  THESE SHOULD GO AWAY AFTER 24 HOURS.    CALL YOUR DOCTOR FOR ANY OF THE FOLLOWING:  SIGNS OF INFECTION (FEVER, GROWING TENDERNESS AT THE SURGERY SITE, A LARGE AMOUNT OF DRAINAGE OR BLEEDING, SEVERE PAIN, FOUL-SMELLING DRAINAGE, REDNESS OR SWELLING.    IT HAS BEEN OVER 8 TO 10 HOURS SINCE SURGERY AND YOU ARE STILL NOT ABLE TO URINATE (PASS WATER).

## 2023-10-10 NOTE — OP NOTE
[facility]  Operative Note    Pre-operative diagnosis: Bilateral nephrolithiasis   Post-operative diagnosis Bilateral nephrolithiasis   Procedure: Procedure(s):  Cystoscopy  bilateral ureteroscopy with thulium fiber laser lithotripsy  bilateral ureteroscopy with stone basketing  bilateral retrograde pyelogram  bilateral ureteral stent exchange  Fluoroscopic interpretation <1 hour physician time   Surgeon: Sheng Barrientos MD   Assistants(s): none   Anesthesia: General    Estimated blood loss: Minimal    Total IV fluids: (See anesthesia record)   Blood transfusion: No transfusion was given during surgery   Total urine output: Not measured   Drains: Bilateral ureteral stents on string/dangles   Specimens: ID Type Source Tests Collected by Time Destination   A : Bilateral Kidney Stones Calculus/Stone Other STONE ANALYSIS Sheng Barrientos MD 10/10/2023  1:07 PM         Implants: Implant Name Type Inv. Item Serial No.  Lot No. LRB No. Used Action   STENT URETERAL POLARIS ULTRA [id number] - [id number] Stent STENT URETERAL POLARIS ULTRA [id number]  [employer] [id number] Left 1 Explanted   STENT URETERAL POLARIS ULTRA [id number] - [id number] Stent STENT URETERAL POLARIS ULTRA [id number]  [employer] [id number] Left 1 Implanted   STENT URETERAL POLARIS ULTRA [id number] - SREF [id number] Stent STENT URETERAL POLARIS ULTRA [id number] REF [id number] [employer] [id number] Right 1 Explanted   STENT URETERAL POLARIS ULTRA [id number] - [id number] Stent STENT URETERAL POLARIS ULTRA [id number]  [employer] [id number] Right 1 Implanted          Findings:   Left side: numerous 3-5 mm fragments especially in the lower pole but also in upper pole and interpolar calyces, as well as smaller amount of dust; basketed and further laser dusted, 98% stone free    Right side: a few 3-4 mm fragments  as well as additional dust in lower pole, basketed, dusted, 98% stone free     Complications: None.   Condition: Stable               Description of procedure:  47 yo M with hyperparathyroidism s/p parathyroidectomy, SLE and Sjogren's on immunosupression, antiphospholipid antibody on chronic anticoagulation, extensive past history of nephrolithiasis s/p bilateral stent placement 9/7/2023 followed by ureteroscopy 9/25/2023 with residual stone burden and planned staged ureteroscopy, course notable for sepsis d/t Staph epidermidis for which is on outpatient vancomycin infusions, who after discussion of the risks and benefits opts to undergo repeat ureteroscopy to complete stone clearance. Risks including need for secondary procedure, incomplete stone clearance, infection, ureteral injury, stent pain and irritation discussed. He wants stent on string if clinically indicated. Informed consent obtained.    The patient was brought to operating room #19.  Ertapenem antibiotics were given prior to the procedure.  General anesthesia was induced, he was placed in dorsolithotomy position, prepped and draped in standard sterile fashion.  A timeout was performed.     A 22 Danish Storz cystoscope was assembled and passed through the urethra into the bladder.  Bilateral ureteral stents were noted emanating from the respective orifices with no encrustation.  The left ureteral stent was grasped with a grasper and brought out the urethral meatus.  The stent was cannulated with a 0.035 inch stiff shaft Glidewire which was passed up to the renal pelvis under fluoroscopic guidance and the existing stent was removed intact and discarded.  The wire was clipped to the drapes as a safety wire.  A Storz long semirigid ureteroscope was assembled and passed through the urethra, into the bladder and up the right ureter.  The ureter was widely patent.  A  film showed a shadowing radiodensity in the lower pole and a gentle retrograde  pyelogram showed mild hydronephrosis.  A second wire was then passed through the scope up to the renal pelvis and the scope was backloaded off the wire.  Over the working wire a [employer] Navigator 12/14 Turkmen by 46 cm ureteral access sheath was passed up to the proximal ureter under fluoroscopic guidance and the obturator and wire were removed.  A HeyLets Lithovue disposable digital flexible ureteroscope was then passed up the access sheath.  Complete pyeloscopy revealed the above findings. Larger fragments were basketed out with Deaver Halo basket and the remainder of the stone burden was dusted with the Olympus Soltive thulium fiber laser.  Pullout ureteroscopy revealed the ureter to be in good condition with no tears or lacerations.  The wire was left in place while attention was turned to the right side.     Right stent removal, placement of a safety wire, semirigid ureteroscopy, retrograde pyelogram, passage of a working wire, placement of an access sheath, flexible pyeloscopy and ureteroscopy with laser lithotripsy and stone basketing was performed in a similar fashion as to the left side. See above findings. Pullout ureteroscopy revealed the ureter to be in good condition with no tears or lacerations. There ureteral access sheath was removed. Bilateral retrograde pyelograms were performed to provide landing zones for the stents. Bilateral ureteral stents were then placed in the usual fashion under fluoroscopic guidance with good coils noted proximally and distally. The stents were left on strings which were trimmed about 3 cm from the urethral meatus. Patient was cleaned up, awoken from anesthesia and brought to PACU in stable condition.    - follow up ~3 months with renal ultrasound, KUB, [facility] prior  - will need a CT scan in about a year to follow stone burden    Sheng Barrientos MD   [facility]  [phone] clinic phone

## 2023-10-10 NOTE — OR NURSING
VINCENT mckeon with patient transferring to phase II of care and discharging to home as long as HR <100 bpm, diastolic BP is < 100 mmHg and patient remains asymptomatic.

## 2023-10-10 NOTE — ANESTHESIA POSTPROCEDURE EVALUATION
Patient: Jim Flores    Procedure: Procedure(s):  Cystoscopy, bilateral ureteroscopy with laser lithotripsy, bilateral ureteroscopy with stone basketing, bilateral retrograde pyelogram, bilateral ureteral stent exchange       Anesthesia Type:  General    Note:  Disposition: Outpatient   Postop Pain Control: Uneventful            Sign Out: Well controlled pain   PONV: No   Neuro/Psych: Uneventful            Sign Out: Acceptable/Baseline neuro status   Airway/Respiratory: Uneventful            Sign Out: Acceptable/Baseline resp. status   CV/Hemodynamics: Uneventful            Sign Out: Acceptable CV status; No obvious hypovolemia; No obvious fluid overload   Other NRE:    DID A NON-ROUTINE EVENT OCCUR? No           Last vitals:  Vitals Value Taken Time   /98 10/10/23 1420   Temp 98.1  F (36.7  C) 10/10/23 1400   Pulse 113 10/10/23 1421   Resp 21 10/10/23 1420   SpO2 99 % 10/10/23 1421   Vitals shown include unvalidated device data.    Electronically Signed By: Marbella Cantor MD  October 10, 2023  2:22 PM

## 2023-10-10 NOTE — ANESTHESIA CARE TRANSFER NOTE
Patient: Jim Flores    Procedure: Procedure(s):  Cystoscopy, bilateral ureteroscopy with laser lithotripsy, bilateral ureteroscopy with stone basketing, bilateral retrograde pyelogram, bilateral ureteral stent exchange       Diagnosis: Kidney stone on left side [N20.0]  Diagnosis Additional Information: No value filed.    Anesthesia Type:   General     Note:    Oropharynx: oropharynx clear of all foreign objects  Level of Consciousness: drowsy  Oxygen Supplementation: face mask  Level of Supplemental Oxygen (L/min / FiO2): 5  Independent Airway: airway patency satisfactory and stable  Dentition: dentition unchanged  Vital Signs Stable: post-procedure vital signs reviewed and stable  Report to RN Given: handoff report given  Patient transferred to: PACU    Handoff Report: Identifed the Patient, Identified the Reponsible Provider, Reviewed the pertinent medical history, Discussed the surgical course, Reviewed Intra-OP anesthesia mangement and issues during anesthesia, Set expectations for post-procedure period and Allowed opportunity for questions and acknowledgement of understanding      Vitals:  Vitals Value Taken Time   BP     Temp     Pulse 118 10/10/23 1402   Resp 13 10/10/23 1402   SpO2 100 % 10/10/23 1402   Vitals shown include unvalidated device data.    Electronically Signed By: CANDELARIA Aiken CRNA  October 10, 2023  2:03 PM

## 2023-10-10 NOTE — ANESTHESIA PREPROCEDURE EVALUATION
Anesthesia Pre-Procedure Evaluation    Patient: Jim Flores   MRN: [id number] : 1975        Procedure : Procedure(s):  Cystoscopy, bilateral ureteroscopy with laser lithotripsy, bilateral ureteroscopy with stone basketing, bilateral retrograde pyelogram, bilateral ureteral stent exchange, bilateral ureteral stent removal          Past Medical History:   Diagnosis Date    Antiphospholipid antibody positive     Anxiety state, unspecified     Cardiomegaly 2023    Hypercalciuria     Nephrolithiasis     Pulmonary infarct (H) 2008    SLE with normal kidneys (H)       Past Surgical History:   Procedure Laterality Date    BIOPSY  ,     Mole check - all good    COLONOSCOPY  2012    Procedure: COLONOSCOPY;  Colonoscopy;  Surgeon: Lupe Ratliff MD;  Location:  GI    COLONOSCOPY Left 2022    Procedure: COLONOSCOPY, FLEXIBLE, WITH POLYP REMOVAL USING HOT SNARE;  Surgeon: Lupe Ratliff MD;  Location:  GI    COMBINED CYSTOSCOPY, RETROGRADES, URETEROSCOPY, LASER HOLMIUM LITHOTRIPSY URETER(S), INSERT STENT Left 2015    Procedure: COMBINED CYSTOSCOPY, RETROGRADES, URETEROSCOPY, LASER HOLMIUM LITHOTRIPSY URETER(S), INSERT STENT;  Surgeon: Ramesh Johnson MD;  [facility]: [facility]    COMBINED CYSTOSCOPY, RETROGRADES, URETEROSCOPY, LASER HOLMIUM LITHOTRIPSY URETER(S), INSERT STENT Bilateral 2023    Procedure: Cystoscopy, bilateral ureteroscopy with holmium laser lithotripsy, bilateral ureteroscopy with stone basketing, bilateral retrograde pyelogram, bilateral ureteral stent exchange;  Surgeon: Sheng Barrientos MD;  Location: [facility]    CYSTOSCOPY, RETROGRADES, INSERT STENT URETER(S), COMBINED Bilateral 2023    Procedure: 1. Cystourethroscopy with bilateral ureteral stent placement 2. Bilateral retrograde pyelography with interpretation of intraoperative fluoroscopic imaging;  Surgeon: Demetris Castillo MD;  Location:  OR    GENITOURINARY SURGERY    2018    kidney stone lithotripsy    HEAD & NECK SURGERY  2018    parathyroidectomy    kidnet stone remov      at [place] March 12 2018    LASER HOLMIUM LITHOTRIPSY URETER(S), INSERT STENT, COMBINED Right 11/11/2015    Procedure: COMBINED CYSTOSCOPY, URETEROSCOPY, LASER HOLMIUM LITHOTRIPSY URETER(S), INSERT STENT;  Surgeon: Ramesh Johnson MD;  [facility]    ZZHC REPAIR INCISIONAL HERNIA,REDUCIBLE  1991    Hernia Repair, Incisional, Unilateral      Allergies   Allergen Reactions    Ampicillin Hives    Bactrim Hives    Ceftazidime     Cefuroxime      Headache, stiff neck and joint pain    Cipro [Ciprofloxacin]      Stiff neck, joint pain, muscle aches    Flomax [Tamsulosin]      Swelling on  face    Ketorolac Tromethamine Itching    Rodolfo Flavor Unknown     rodolfo fruit gives lip swelling, hives    Penicillins Hives and Unknown    Poison Ivy Extract Unknown    Sulfamethoxazole-Trimethoprim Unknown    Tramadol Itching    Trimethoprim Hives      Social History     Tobacco Use    Smoking status: Never    Smokeless tobacco: Never   Substance Use Topics    Alcohol use: Yes     Alcohol/week: 0.0 standard drinks of alcohol     Comment: three per week      Wt Readings from Last 1 Encounters:   10/10/23 74.4 kg (164 lb)        Anesthesia Evaluation   Pt has had prior anesthetic. Type: General.    No history of anesthetic complications       ROS/MED HX  ENT/Pulmonary:    (-) tobacco use, asthma and sleep apnea   Neurologic:    (-) no seizures, no CVA and migraines   Cardiovascular: Comment: Hx cardiomegally    (+)  hypertension- -   -  - -                                   (-) CAD, GRAMAJO, arrhythmias, valvular problems/murmurs and dyslipidemia   METS/Exercise Tolerance:     Hematologic:     (+) History of blood clots,    pt is anticoagulated,        (-) anemia   Musculoskeletal:   (+)  arthritis,             GI/Hepatic:     (+) GERD, Asymptomatic on medication,               (-) liver disease   Renal/Genitourinary:     (+)  renal disease, type: CRI,     Nephrolithiasis ,       Endo: Comment: Lupus, Antiphospholipid syndrome    (+)            Chronic steroid usage for Arthritis.      (-) Type I DM, Type II DM, thyroid disease and obesity   Psychiatric/Substance Use:     (+) psychiatric history anxiety       Infectious Disease:  - neg infectious disease ROS  (-) Recent Fever   Malignancy:  - neg malignancy ROS     Other:            Physical Exam    Airway        Mallampati: II   TM distance: > 3 FB   Neck ROM: full   Mouth opening: > 3 cm    Respiratory Devices and Support         Dental           Cardiovascular   cardiovascular exam normal          Pulmonary   pulmonary exam normal                OUTSIDE LABS:  CBC:   Lab Results   Component Value Date    WBC 10.9 10/09/2023    WBC 6.7 10/04/2023    HGB 14.4 10/09/2023    HGB 14.3 10/04/2023    HCT 42.3 10/09/2023    HCT 41.5 10/04/2023     10/09/2023     10/04/2023     BMP:   Lab Results   Component Value Date     10/02/2023     10/01/2023    POTASSIUM 3.6 10/02/2023    POTASSIUM 3.8 10/01/2023    CHLORIDE 107 10/02/2023    CHLORIDE 108 (H) 10/01/2023    CO2 24 10/02/2023    CO2 26 10/01/2023    BUN 11.5 10/02/2023    BUN 13.5 10/01/2023    CR 1.29 (H) 10/09/2023    CR 1.24 (H) 10/06/2023    GLC 89 10/02/2023    GLC 90 10/01/2023     COAGS:   Lab Results   Component Value Date    PTT 29 11/14/2015    INR 1.52 (H) 10/02/2023     POC:   Lab Results   Component Value Date     (H) 11/15/2015     HEPATIC:   Lab Results   Component Value Date    ALBUMIN 3.1 (L) 10/01/2023    PROTTOTAL 5.2 (L) 10/01/2023    ALT 61 10/01/2023    AST 34 10/01/2023    ALKPHOS 45 10/01/2023    BILITOTAL 0.4 10/01/2023     OTHER:   Lab Results   Component Value Date    LACT 1.8 09/29/2023    SANDY 8.5 (L) 10/02/2023    PHOS 2.7 11/15/2022    LIPASE 72 08/22/2008    TSH 1.14 09/24/2013    CRP 12.2 (H) 11/28/2015    SED 15 11/28/2015       Anesthesia Plan    ASA Status:  3    NPO  Status:  NPO Appropriate    Anesthesia Type: General.     - Airway: LMA   Induction: Intravenous.   Maintenance: Balanced.        Consents    Anesthesia Plan(s) and associated risks, benefits, and realistic alternatives discussed. Questions answered and patient/representative(s) expressed understanding.     - Discussed:     - Discussed with:  Patient            Postoperative Care    Pain management: IV analgesics, Oral pain medications, Multi-modal analgesia.   PONV prophylaxis: Ondansetron (or other 5HT-3), Dexamethasone or Solumedrol     Comments:                Marbella Cantor MD

## 2023-10-14 LAB
APPEARANCE STONE: NORMAL
COMPN STONE: NORMAL
SPECIMEN WT: 140 MG

## 2023-10-19 ENCOUNTER — HOSPITAL ENCOUNTER (EMERGENCY)
Facility: CLINIC | Age: 48
Discharge: HOME | End: 2023-10-20
Attending: EMERGENCY MEDICINE | Admitting: EMERGENCY MEDICINE
Payer: COMMERCIAL

## 2023-10-19 ENCOUNTER — APPOINTMENT (OUTPATIENT)
Dept: ULTRASOUND IMAGING | Facility: CLINIC | Age: 48
End: 2023-10-19
Attending: EMERGENCY MEDICINE
Payer: COMMERCIAL

## 2023-10-19 DIAGNOSIS — I82.611 SUPERFICIAL VENOUS THROMBOSIS OF ARM, RIGHT: ICD-10-CM

## 2023-10-19 LAB
HOLD SPECIMEN: NORMAL
INR PPP: 2.78 (ref 0.85–1.15)

## 2023-10-19 PROCEDURE — 36415 COLL VENOUS BLD VENIPUNCTURE: CPT | Performed by: EMERGENCY MEDICINE

## 2023-10-19 PROCEDURE — 85025 COMPLETE CBC W/AUTO DIFF WBC: CPT | Performed by: EMERGENCY MEDICINE

## 2023-10-19 PROCEDURE — 99285 EMERGENCY DEPT VISIT HI MDM: CPT | Mod: 25

## 2023-10-19 PROCEDURE — 85610 PROTHROMBIN TIME: CPT | Performed by: EMERGENCY MEDICINE

## 2023-10-19 PROCEDURE — 93971 EXTREMITY STUDY: CPT | Mod: RT

## 2023-10-19 PROCEDURE — 82310 ASSAY OF CALCIUM: CPT | Performed by: EMERGENCY MEDICINE

## 2023-10-20 VITALS
DIASTOLIC BLOOD PRESSURE: 90 MMHG | HEART RATE: 88 BPM | OXYGEN SATURATION: 98 % | TEMPERATURE: 97.6 F | HEIGHT: 69 IN | WEIGHT: 169.97 LBS | RESPIRATION RATE: 18 BRPM | SYSTOLIC BLOOD PRESSURE: 146 MMHG | BODY MASS INDEX: 25.18 KG/M2

## 2023-10-20 LAB
ANION GAP SERPL CALCULATED.3IONS-SCNC: 8 MMOL/L (ref 7–15)
BASO+EOS+MONOS # BLD AUTO: ABNORMAL 10*3/UL
BASO+EOS+MONOS NFR BLD AUTO: ABNORMAL %
BASOPHILS # BLD AUTO: 0 10E3/UL (ref 0–0.2)
BASOPHILS NFR BLD AUTO: 1 %
BUN SERPL-MCNC: 16 MG/DL (ref 6–20)
CALCIUM SERPL-MCNC: 9.2 MG/DL (ref 8.6–10)
CHLORIDE SERPL-SCNC: 98 MMOL/L (ref 98–107)
CREAT SERPL-MCNC: 1.39 MG/DL (ref 0.67–1.17)
DEPRECATED HCO3 PLAS-SCNC: 29 MMOL/L (ref 22–29)
EGFRCR SERPLBLD CKD-EPI 2021: 62 ML/MIN/1.73M2
EOSINOPHIL # BLD AUTO: 0.1 10E3/UL (ref 0–0.7)
EOSINOPHIL NFR BLD AUTO: 1 %
ERYTHROCYTE [DISTWIDTH] IN BLOOD BY AUTOMATED COUNT: 14 % (ref 10–15)
FACT X ACT/NOR PPP CHRO: 28 % (ref 70–130)
GLUCOSE SERPL-MCNC: 98 MG/DL (ref 70–99)
HCT VFR BLD AUTO: 42.8 % (ref 40–53)
HGB BLD-MCNC: 14 G/DL (ref 13.3–17.7)
IMM GRANULOCYTES # BLD: 0 10E3/UL
IMM GRANULOCYTES NFR BLD: 1 %
LYMPHOCYTES # BLD AUTO: 0.5 10E3/UL (ref 0.8–5.3)
LYMPHOCYTES NFR BLD AUTO: 8 %
MCH RBC QN AUTO: 28.5 PG (ref 26.5–33)
MCHC RBC AUTO-ENTMCNC: 32.7 G/DL (ref 31.5–36.5)
MCV RBC AUTO: 87 FL (ref 78–100)
MONOCYTES # BLD AUTO: 0.4 10E3/UL (ref 0–1.3)
MONOCYTES NFR BLD AUTO: 7 %
NEUTROPHILS # BLD AUTO: 5.3 10E3/UL (ref 1.6–8.3)
NEUTROPHILS NFR BLD AUTO: 82 %
NRBC # BLD AUTO: 0 10E3/UL
NRBC BLD AUTO-RTO: 0 /100
PLATELET # BLD AUTO: 328 10E3/UL (ref 150–450)
POTASSIUM SERPL-SCNC: 5.1 MMOL/L (ref 3.4–5.3)
RBC # BLD AUTO: 4.91 10E6/UL (ref 4.4–5.9)
SODIUM SERPL-SCNC: 135 MMOL/L (ref 135–145)
WBC # BLD AUTO: 6.4 10E3/UL (ref 4–11)

## 2023-10-20 PROCEDURE — 85260 CLOT FACTOR X STUART-POWER: CPT | Performed by: EMERGENCY MEDICINE

## 2023-10-20 PROCEDURE — 36415 COLL VENOUS BLD VENIPUNCTURE: CPT | Performed by: EMERGENCY MEDICINE

## 2023-10-20 PROCEDURE — 250N000013 HC RX MED GY IP 250 OP 250 PS 637: Performed by: EMERGENCY MEDICINE

## 2023-10-20 RX ORDER — DOXYCYCLINE 100 MG/1
100 CAPSULE ORAL ONCE
Status: COMPLETED | OUTPATIENT
Start: 2023-10-20 | End: 2023-10-20

## 2023-10-20 RX ORDER — DOXYCYCLINE 100 MG/1
100 CAPSULE ORAL 2 TIMES DAILY
Qty: 10 CAPSULE | Refills: 0 | Status: SHIPPED | OUTPATIENT
Start: 2023-10-20 | End: 2023-10-25

## 2023-10-20 RX ADMIN — DOXYCYCLINE HYCLATE 100 MG: 100 CAPSULE ORAL at 01:15

## 2023-10-20 ASSESSMENT — ACTIVITIES OF DAILY LIVING (ADL): ADLS_ACUITY_SCORE: 37

## 2023-10-20 NOTE — DISCHARGE INSTRUCTIONS
You need to have your INR rechecked on Monday.    Return to the ER for increased redness or swelling, fever, difficulty breathing, any abnormal bleeding, or any new concerns.    Use a warm compress for 20 minutes 3-4 times per day as discussed.

## 2023-10-20 NOTE — ED PROVIDER NOTES
History     Chief Complaint:  Arm Swelling       HPI   Jim Flores is a 48 year old male who presents to the ED with right arm erythema and swelling.  He was recently mated to the hospital with sepsis secondary to multidrug-resistant Staph epidermidis.  He ultimately was treated with vancomycin.  He continued antibiotics through a PICC line which was removed 1 week ago.  He has subsequently especially in the last 3 to 4 days developed swelling of the medial aspect of the right upper arm.  There is warmth and erythema that began today.  No fever, chills, or nausea or other systemic symptoms.  He is no longer getting any antibiotics orally.    The patient has a history of antiphospholipid antibody and is on warfarin.  However, this was stopped and just resumed about 1 week ago.  He denies any chest pain or shortness of breath.  No extremity swelling elsewhere.      Review of External Notes:  Discharge summary reviewed from October 2, 2023 when the patient was admitted with sepsis secondary to complicated urinary tract infection    Medications:    doxycycline hyclate (VIBRAMYCIN) 100 MG capsule  acetaminophen (TYLENOL) 500 MG tablet  atorvastatin (LIPITOR) 10 MG tablet  docusate sodium (COLACE) 100 MG capsule  hydroxychloroquine (PLAQUENIL) 200 MG tablet  ketoconazole (NIZORAL) 2 % external shampoo  LORazepam (ATIVAN) 0.5 MG tablet  NONFORMULARY  Omega-3 Fatty Acids (FISH OIL) 1200 MG CPDR  oxyBUTYnin (DITROPAN) 5 MG tablet  oxyCODONE (ROXICODONE) 5 MG tablet  predniSONE (DELTASONE) 5 MG tablet  propranolol (INDERAL) 10 MG tablet  triamcinolone (KENALOG) 0.1 % external cream  venlafaxine (EFFEXOR-ER) 150 MG 24 hr tablet  VITAMIN D, CHOLECALCIFEROL, PO  warfarin ANTICOAGULANT (COUMADIN) 5 MG tablet        Past Medical History:    Past Medical History:   Diagnosis Date    Antiphospholipid antibody positive     Anxiety state, unspecified     Cardiomegaly 09/07/2023    Hypercalciuria     Nephrolithiasis     Pulmonary  infarct (H) 09/01/2008    SLE with normal kidneys (H)        Past Surgical History:    Past Surgical History:   Procedure Laterality Date    BIOPSY  2014, 2021    Mole check - all good    COLONOSCOPY  11/19/2012    Procedure: COLONOSCOPY;  Colonoscopy;  Surgeon: Lupe Ratliff MD;  Location: [facility]    COLONOSCOPY Left 4/12/2022    Procedure: COLONOSCOPY, FLEXIBLE, WITH POLYP REMOVAL USING HOT SNARE;  Surgeon: Lupe Ratliff MD;  Location: [facility]    COMBINED CYSTOSCOPY, RETROGRADES, URETEROSCOPY, LASER HOLMIUM LITHOTRIPSY URETER(S), INSERT STENT Left 6/24/2015    Procedure: COMBINED CYSTOSCOPY, RETROGRADES, URETEROSCOPY, LASER HOLMIUM LITHOTRIPSY URETER(S), INSERT STENT;  Surgeon: Ramesh Johnson MD;  [facility]    COMBINED CYSTOSCOPY, RETROGRADES, URETEROSCOPY, LASER HOLMIUM LITHOTRIPSY URETER(S), INSERT STENT Bilateral 9/25/2023    Procedure: Cystoscopy, bilateral ureteroscopy with holmium laser lithotripsy, bilateral ureteroscopy with stone basketing, bilateral retrograde pyelogram, bilateral ureteral stent exchange;  Surgeon: Sheng Barrientos MD;  Location: [facility]    COMBINED CYSTOSCOPY, RETROGRADES, URETEROSCOPY, LASER HOLMIUM LITHOTRIPSY URETER(S), INSERT STENT Bilateral 10/10/2023    Procedure: Cystoscopy, bilateral ureteroscopy with thulium fiber laser lithotripsy, bilateral ureteroscopy with stone basketing, bilateral retrograde pyelogram, bilateral ureteral stent exchange, fluoroscopic interpretation <1 hour physician time;  Surgeon: Sheng Barrientos MD;  Location: [facility]    CYSTOSCOPY, RETROGRADES, INSERT STENT URETER(S), COMBINED Bilateral 9/7/2023    Procedure: 1. Cystourethroscopy with bilateral ureteral stent placement 2. Bilateral retrograde pyelography with interpretation of intraoperative fluoroscopic imaging;  Surgeon: Demetris Castillo MD;  Location: [facility]    GENITOURINARY SURGERY  2015 2018    kidney stone lithotripsy    HEAD & NECK SURGERY  2018     "parathyroidectomy    kidnet stone remov      at [place] March 12 2018    LASER HOLMIUM LITHOTRIPSY URETER(S), INSERT STENT, COMBINED Right 11/11/2015    Procedure: COMBINED CYSTOSCOPY, URETEROSCOPY, LASER HOLMIUM LITHOTRIPSY URETER(S), INSERT STENT;  Surgeon: Ramesh Johnson MD;  [facility]    ZZHC REPAIR INCISIONAL HERNIA,REDUCIBLE  1991    Hernia Repair, Incisional, Unilateral          Physical Exam   Patient Vitals for the past 24 hrs:   BP Temp Temp src Pulse Resp SpO2 Height Weight   10/20/23 0118 (!) 146/90 -- -- 88 18 98 % -- --   10/19/23 2024 (!) 156/104 97.6  F (36.4  C) Temporal 99 18 100 % 1.753 m (5' 9\") 77.1 kg (169 lb 15.6 oz)        Physical Exam  Constitutional:       General: He is not in acute distress.     Appearance: Normal appearance. He is not toxic-appearing.   HENT:      Head: Atraumatic.   Eyes:      General: No scleral icterus.     Conjunctiva/sclera: Conjunctivae normal.   Cardiovascular:      Rate and Rhythm: Normal rate and regular rhythm.      Heart sounds: Normal heart sounds.   Pulmonary:      Effort: Pulmonary effort is normal. No respiratory distress.      Breath sounds: Normal breath sounds.   Abdominal:      Palpations: Abdomen is soft.      Tenderness: There is no abdominal tenderness.   Musculoskeletal:         General: No deformity.      Cervical back: Neck supple.      Right lower leg: No edema.      Left lower leg: No edema.   Skin:     General: Skin is warm.      Capillary Refill: Capillary refill takes less than 2 seconds.      Comments: There is erythema measuring approximately 3 to 4 cm across over the medial aspect of the right upper arm.  No crepitance.  No elbow effusion.  The erythema is just proximal to the elbow.  At the right antecubital fossa there does appear to be a focal area of swelling.  The PICC line entrance site is proximal to the area of erythema and appears to be healing normally.   Neurological:      General: No focal deficit present.      " Mental Status: He is alert and oriented to person, place, and time.   Psychiatric:         Mood and Affect: Mood normal.         Behavior: Behavior normal.           Emergency Department Course     Imaging:  US Upper Extremity Venous Duplex Right   Final Result   IMPRESSION:   1.  No deep venous thrombosis in the right upper extremity.   2.  Superficial vein thrombosis, right basilic vein.        Report per radiology    Laboratory:  Labs Ordered and Resulted from Time of ED Arrival to Time of ED Departure   INR - Abnormal       Result Value    INR 2.78 (*)    BASIC METABOLIC PANEL - Abnormal    Sodium 135      Potassium 5.1      Chloride 98      Carbon Dioxide (CO2) 29      Anion Gap 8      Urea Nitrogen 16.0      Creatinine 1.39 (*)     GFR Estimate 62      Calcium 9.2      Glucose 98     CBC WITH PLATELETS AND DIFFERENTIAL - Abnormal    WBC Count 6.4      RBC Count 4.91      Hemoglobin 14.0      Hematocrit 42.8      MCV 87      MCH 28.5      MCHC 32.7      RDW 14.0      Platelet Count 328      % Neutrophils 82      % Lymphocytes 8      % Monocytes 7      Mids % (Monos, Eos, Basos)        % Eosinophils 1      % Basophils 1      % Immature Granulocytes 1      NRBCs per 100 WBC 0      Absolute Neutrophils 5.3      Absolute Lymphocytes 0.5 (*)     Absolute Monocytes 0.4      Mids Abs (Monos, Eos, Basos)        Absolute Eosinophils 0.1      Absolute Basophils 0.0      Absolute Immature Granulocytes 0.0      Absolute NRBCs 0.0     FACTOR 10 CHROMOGENIC        Emergency Department Course & Assessments:             Interventions:  Medications   doxycycline hyclate (VIBRAMYCIN) capsule 100 mg (100 mg Oral $Given 10/20/23 0115)         Disposition:  The patient was discharged to home.     Impression & Plan      Medical Decision Making:  This patient is a very pleasant 48-year-old man who presents to the ED with right arm erythema and swelling.  He has a history of prior pulmonary emboli and is anticoagulated with warfarin.   The warfarin was stopped recently was he was admitted with sepsis from staph epidermidis.  He had a PICC line that was removed 1 week ago.  He is currently off antibiotics.  He presents with pain and swelling of his arm.  PICC line insertion site appears to be normal.  He does have a basilic vein superficial thrombus on his ultrasound.  No DVT.  By exam he also has an area of erythema that is away from the basilic vein.  However, he is afebrile without systemic toxicity.  White count is normal.  He does not require admission and certainly is not septic.    Given the patient's complex history with use of anticoagulation, underlying clotting disorder, recent sepsis, and multiple antibiotic intolerances and allergies, I had a long discussion with the patient in regards to his care going forward.  He is on warfarin which should assist with resolution of the basilic vein thrombus.  He had not been therapeutic when he stopped it so anticipate better anticoagulation going forward.  The patient also use warm compresses.    Given the cellulitis I discussed potentially watching and waiting as the basilic vein thrombus resolves.  The patient would feel more comfortable with antibiotics.  He should be able to tolerate doxycycline.  We discussed that this may make him effectively supratherapeutic in regards to his anticoagulation.  He was advised to have a repeat INR in 4 days which would be Monday.  He will return immediately for worsening infection or any abnormal bleeding.        Diagnosis:    ICD-10-CM    1. Superficial venous thrombosis of arm, right  I82.611            Discharge Medications:  Discharge Medication List as of 10/20/2023  1:16 AM        START taking these medications    Details   doxycycline hyclate (VIBRAMYCIN) 100 MG capsule Take 1 capsule (100 mg) by mouth 2 times daily for 5 days, Disp-10 capsule, R-0, E-Prescribe                Collins Mendoza MD  10/19/2023                Collins Mendoza,  MD  10/20/23 0239

## 2023-10-20 NOTE — ED TRIAGE NOTES
Pt recently admitted to hospital for kidney stone then turned septic. PICC line removed from right arm one week ago. About 3-4 days, pt noticed bruising. Pt now noticing increasing swelling and redness to area. Pt on coumadin (restarted 8 days ago) and would like INR checked. ABCs intact. A&Ox4.      Triage Assessment (Adult)       Row Name 10/19/23 2025          Triage Assessment    Airway WDL WDL        Respiratory WDL    Respiratory WDL WDL        Skin Circulation/Temperature WDL    Skin Circulation/Temperature WDL WDL        Cardiac WDL    Cardiac WDL WDL        Peripheral/Neurovascular WDL    Peripheral Neurovascular WDL X;neurovascular assessment upper        LUE Neurovascular Assessment    Temperature LUE warm     Color LUE red

## 2023-10-23 ENCOUNTER — PATIENT OUTREACH (OUTPATIENT)
Dept: CARE COORDINATION | Facility: CLINIC | Age: 48
End: 2023-10-23
Payer: COMMERCIAL

## 2023-11-18 ENCOUNTER — MYC MEDICAL ADVICE (OUTPATIENT)
Dept: PEDIATRICS | Facility: CLINIC | Age: 48
End: 2023-11-18
Payer: COMMERCIAL

## 2023-11-18 DIAGNOSIS — Z12.5 SCREENING FOR PROSTATE CANCER: ICD-10-CM

## 2023-11-18 DIAGNOSIS — E21.3 HYPERPARATHYROIDISM (H): Primary | ICD-10-CM

## 2023-11-18 DIAGNOSIS — M32.9 SYSTEMIC LUPUS ERYTHEMATOSUS, UNSPECIFIED SLE TYPE, UNSPECIFIED ORGAN INVOLVEMENT STATUS (H): ICD-10-CM

## 2023-11-18 DIAGNOSIS — Z13.1 SCREENING FOR DIABETES MELLITUS: ICD-10-CM

## 2023-11-19 DIAGNOSIS — F41.1 ANXIETY STATE: ICD-10-CM

## 2023-11-20 PROBLEM — N39.0 COMPLICATED UTI (URINARY TRACT INFECTION): Status: RESOLVED | Noted: 2023-09-29 | Resolved: 2023-11-20

## 2023-11-20 PROBLEM — A41.9 SEVERE SEPSIS (H): Status: RESOLVED | Noted: 2023-09-29 | Resolved: 2023-11-20

## 2023-11-20 PROBLEM — R65.20 SEVERE SEPSIS (H): Status: RESOLVED | Noted: 2023-09-29 | Resolved: 2023-11-20

## 2023-11-20 RX ORDER — VENLAFAXINE HYDROCHLORIDE 150 MG/1
300 CAPSULE, EXTENDED RELEASE ORAL DAILY
Qty: 180 CAPSULE | Refills: 0 | Status: SHIPPED | OUTPATIENT
Start: 2023-11-20 | End: 2023-12-15

## 2023-11-30 ENCOUNTER — LAB (OUTPATIENT)
Dept: LAB | Facility: CLINIC | Age: 48
End: 2023-11-30
Payer: COMMERCIAL

## 2023-11-30 DIAGNOSIS — M32.9 SYSTEMIC LUPUS ERYTHEMATOSUS, UNSPECIFIED SLE TYPE, UNSPECIFIED ORGAN INVOLVEMENT STATUS (H): ICD-10-CM

## 2023-11-30 DIAGNOSIS — Z12.5 SCREENING FOR PROSTATE CANCER: ICD-10-CM

## 2023-11-30 DIAGNOSIS — Z13.1 SCREENING FOR DIABETES MELLITUS: ICD-10-CM

## 2023-11-30 DIAGNOSIS — E21.3 HYPERPARATHYROIDISM (H): ICD-10-CM

## 2023-11-30 LAB
CALCIUM SERPL-MCNC: 9.1 MG/DL (ref 8.6–10)
FASTING STATUS PATIENT QL REPORTED: YES
GLUCOSE SERPL-MCNC: 89 MG/DL (ref 70–99)
HBA1C MFR BLD: 5 % (ref 0–5.6)
HCYS SERPL-SCNC: 12.1 UMOL/L (ref 0–15)
PHOSPHATE SERPL-MCNC: 2.6 MG/DL (ref 2.5–4.5)
PSA SERPL DL<=0.01 NG/ML-MCNC: 1.31 NG/ML (ref 0–3.5)
PTH-INTACT SERPL-MCNC: 56 PG/ML (ref 15–65)
VIT D+METAB SERPL-MCNC: 33 NG/ML (ref 20–50)

## 2023-11-30 PROCEDURE — 83970 ASSAY OF PARATHORMONE: CPT

## 2023-11-30 PROCEDURE — 84100 ASSAY OF PHOSPHORUS: CPT

## 2023-11-30 PROCEDURE — 36415 COLL VENOUS BLD VENIPUNCTURE: CPT

## 2023-11-30 PROCEDURE — 83036 HEMOGLOBIN GLYCOSYLATED A1C: CPT

## 2023-11-30 PROCEDURE — 82947 ASSAY GLUCOSE BLOOD QUANT: CPT

## 2023-11-30 PROCEDURE — 82310 ASSAY OF CALCIUM: CPT

## 2023-11-30 PROCEDURE — 83090 ASSAY OF HOMOCYSTEINE: CPT

## 2023-11-30 PROCEDURE — G0103 PSA SCREENING: HCPCS

## 2023-11-30 PROCEDURE — 82306 VITAMIN D 25 HYDROXY: CPT

## 2023-11-30 PROCEDURE — 99000 SPECIMEN HANDLING OFFICE-LAB: CPT

## 2023-11-30 PROCEDURE — 82172 ASSAY OF APOLIPOPROTEIN: CPT | Mod: 90

## 2023-12-02 LAB — APO B100 SERPL-MCNC: 76 MG/DL

## 2023-12-05 ENCOUNTER — APPOINTMENT (OUTPATIENT)
Dept: CT IMAGING | Facility: CLINIC | Age: 48
End: 2023-12-05
Attending: EMERGENCY MEDICINE
Payer: COMMERCIAL

## 2023-12-05 ENCOUNTER — APPOINTMENT (OUTPATIENT)
Dept: GENERAL RADIOLOGY | Facility: CLINIC | Age: 48
End: 2023-12-05
Attending: EMERGENCY MEDICINE
Payer: COMMERCIAL

## 2023-12-05 ENCOUNTER — NURSE TRIAGE (OUTPATIENT)
Dept: PEDIATRICS | Facility: CLINIC | Age: 48
End: 2023-12-05
Payer: COMMERCIAL

## 2023-12-05 ENCOUNTER — HOSPITAL ENCOUNTER (EMERGENCY)
Facility: CLINIC | Age: 48
Discharge: HOME | End: 2023-12-05
Attending: EMERGENCY MEDICINE | Admitting: EMERGENCY MEDICINE
Payer: COMMERCIAL

## 2023-12-05 ENCOUNTER — TRANSFERRED RECORDS (OUTPATIENT)
Dept: HEALTH INFORMATION MANAGEMENT | Facility: CLINIC | Age: 48
End: 2023-12-05

## 2023-12-05 VITALS
RESPIRATION RATE: 20 BRPM | HEART RATE: 70 BPM | TEMPERATURE: 98.1 F | DIASTOLIC BLOOD PRESSURE: 101 MMHG | SYSTOLIC BLOOD PRESSURE: 154 MMHG | OXYGEN SATURATION: 100 %

## 2023-12-05 DIAGNOSIS — R00.2 PALPITATIONS: ICD-10-CM

## 2023-12-05 LAB
ANION GAP SERPL CALCULATED.3IONS-SCNC: 7 MMOL/L (ref 7–15)
BASOPHILS # BLD AUTO: 0.1 10E3/UL (ref 0–0.2)
BASOPHILS NFR BLD AUTO: 1 %
BUN SERPL-MCNC: 13.9 MG/DL (ref 6–20)
CALCIUM SERPL-MCNC: 8.9 MG/DL (ref 8.6–10)
CHLORIDE SERPL-SCNC: 101 MMOL/L (ref 98–107)
CREAT SERPL-MCNC: 1.1 MG/DL (ref 0.67–1.17)
DEPRECATED HCO3 PLAS-SCNC: 28 MMOL/L (ref 22–29)
EGFRCR SERPLBLD CKD-EPI 2021: 82 ML/MIN/1.73M2
EOSINOPHIL # BLD AUTO: 0.1 10E3/UL (ref 0–0.7)
EOSINOPHIL NFR BLD AUTO: 2 %
ERYTHROCYTE [DISTWIDTH] IN BLOOD BY AUTOMATED COUNT: 14.1 % (ref 10–15)
GLUCOSE SERPL-MCNC: 111 MG/DL (ref 70–99)
HCT VFR BLD AUTO: 41.4 % (ref 40–53)
HGB BLD-MCNC: 14 G/DL (ref 13.3–17.7)
IMM GRANULOCYTES # BLD: 0 10E3/UL
IMM GRANULOCYTES NFR BLD: 0 %
INR PPP: 2 (ref 0.85–1.15)
LYMPHOCYTES # BLD AUTO: 0.7 10E3/UL (ref 0.8–5.3)
LYMPHOCYTES NFR BLD AUTO: 13 %
MCH RBC QN AUTO: 28.9 PG (ref 26.5–33)
MCHC RBC AUTO-ENTMCNC: 33.8 G/DL (ref 31.5–36.5)
MCV RBC AUTO: 86 FL (ref 78–100)
MONOCYTES # BLD AUTO: 0.5 10E3/UL (ref 0–1.3)
MONOCYTES NFR BLD AUTO: 9 %
NEUTROPHILS # BLD AUTO: 4 10E3/UL (ref 1.6–8.3)
NEUTROPHILS NFR BLD AUTO: 75 %
NRBC # BLD AUTO: 0 10E3/UL
NRBC BLD AUTO-RTO: 0 /100
PLATELET # BLD AUTO: 236 10E3/UL (ref 150–450)
POTASSIUM SERPL-SCNC: 4.5 MMOL/L (ref 3.4–5.3)
RBC # BLD AUTO: 4.84 10E6/UL (ref 4.4–5.9)
SODIUM SERPL-SCNC: 136 MMOL/L (ref 135–145)
TROPONIN T SERPL HS-MCNC: 6 NG/L
TROPONIN T SERPL HS-MCNC: <6 NG/L
WBC # BLD AUTO: 5.3 10E3/UL (ref 4–11)

## 2023-12-05 PROCEDURE — 96360 HYDRATION IV INFUSION INIT: CPT | Mod: 59

## 2023-12-05 PROCEDURE — 36415 COLL VENOUS BLD VENIPUNCTURE: CPT | Performed by: EMERGENCY MEDICINE

## 2023-12-05 PROCEDURE — 84484 ASSAY OF TROPONIN QUANT: CPT | Performed by: EMERGENCY MEDICINE

## 2023-12-05 PROCEDURE — 258N000003 HC RX IP 258 OP 636: Performed by: EMERGENCY MEDICINE

## 2023-12-05 PROCEDURE — 71275 CT ANGIOGRAPHY CHEST: CPT

## 2023-12-05 PROCEDURE — 80048 BASIC METABOLIC PNL TOTAL CA: CPT | Performed by: EMERGENCY MEDICINE

## 2023-12-05 PROCEDURE — 93005 ELECTROCARDIOGRAM TRACING: CPT

## 2023-12-05 PROCEDURE — 85025 COMPLETE CBC W/AUTO DIFF WBC: CPT | Performed by: EMERGENCY MEDICINE

## 2023-12-05 PROCEDURE — 96361 HYDRATE IV INFUSION ADD-ON: CPT

## 2023-12-05 PROCEDURE — 85610 PROTHROMBIN TIME: CPT | Performed by: EMERGENCY MEDICINE

## 2023-12-05 PROCEDURE — 99285 EMERGENCY DEPT VISIT HI MDM: CPT | Mod: 25

## 2023-12-05 PROCEDURE — 250N000011 HC RX IP 250 OP 636: Performed by: EMERGENCY MEDICINE

## 2023-12-05 PROCEDURE — 71046 X-RAY EXAM CHEST 2 VIEWS: CPT

## 2023-12-05 RX ORDER — IOPAMIDOL 755 MG/ML
500 INJECTION, SOLUTION INTRAVASCULAR ONCE
Status: COMPLETED | OUTPATIENT
Start: 2023-12-05 | End: 2023-12-05

## 2023-12-05 RX ADMIN — SODIUM CHLORIDE 1000 ML: 9 INJECTION, SOLUTION INTRAVENOUS at 19:41

## 2023-12-05 RX ADMIN — IOPAMIDOL 60 ML: 755 INJECTION, SOLUTION INTRAVENOUS at 20:11

## 2023-12-05 ASSESSMENT — ACTIVITIES OF DAILY LIVING (ADL)
ADLS_ACUITY_SCORE: 37
ADLS_ACUITY_SCORE: 37

## 2023-12-05 NOTE — TELEPHONE ENCOUNTER
RN contacted [facility], full for today, no availability.     RN advised patient to go to ED. Patient states his wife will drive him. RN advised if starting to experience difficulty breathing, dizziness with HR over 140 to pull over and call 911. Patient verbalized understanding and agreed with plan.     Oskar MATSON RN 12/5/2023 at 4:44 PM

## 2023-12-05 NOTE — ED TRIAGE NOTES
Pt presents here for eval of palpitations that started yesterday night. Pt reports that the palpitations has been on and off since then and sometimes can feels discomfort with the palpitations. Pt denies CP, n/v or SOB. Pt A&Ox4, ambulatory. CMS intact.     Triage Assessment (Adult)       Row Name 12/05/23 1732          Triage Assessment    Airway WDL WDL        Respiratory WDL    Respiratory WDL WDL        Skin Circulation/Temperature WDL    Skin Circulation/Temperature WDL WDL        Cognitive/Neuro/Behavioral WDL    Cognitive/Neuro/Behavioral WDL WDL        Andreas Coma Scale    Best Eye Response 4-->(E4) spontaneous     Best Motor Response 6-->(M6) obeys commands     Best Verbal Response 5-->(V5) oriented     [place] Coma Scale Score 15

## 2023-12-05 NOTE — TELEPHONE ENCOUNTER
Please reach out to [facility] and see if they will see him today.  If not, needs to go to ED.    Tala Block MD

## 2023-12-05 NOTE — TELEPHONE ENCOUNTER
Nurse Triage SBAR    Is this a 2nd Level Triage? YES, LICENSED PRACTITIONER REVIEW IS REQUIRED    S-(situation): Received call from patient. Patient has apple watch which alerted him twice this afternoon that he was experiencing a. Fib. Never happened before. Alert occurred at 3:26pm and again on 3:32pm. Patient continues to experiencing fluttering in his chest on and off since then.     B-(background): Patient notes he started on new medication, colchicine 4 days ago. No other new medications. Patient was taking methylguard supplement which contains methylfolate, B6, and B12 vitamins, he ran out of this 2 days ago. Denies any excess caffeine intake today. Had kidney stone surgery in mid October, is on low salt and low liquid diet. Denies feeling dehydrated today. Had to do 24 hr urine collection yesterday, reports having plenty of output.     A-(assessment): Patient continues to feel fluttering sensation in chest, though less strong as when his watch alerted him. Has been ongoing on and off since 3:32pm. Patient's HR is 100bpm now. No dizziness. No lightheadedness. No chest pain. No difficulty breathing. Apple watch during call is reporting normal sinus rhythm even though patient continues to feel fluttering in chest.     Protocol Recommended Disposition:   See in Office Today, Callback by PCP Today    Recommendation: Second level triage: ED/UC/ OV (within what timeframe)?      Routed to provider    Does the patient meet one of the following criteria for [facility] visit consideration? 16+ years old, with an [facility] PCP     TIP  Providers, please consider if this condition is appropriate for management at one of our Acute and Diagnostic Services sites.     If patient is a good candidate, please use dotphrase <dot>triageresponse and select Refer to [facility] to document.      Reason for Disposition   Heart rhythm alert (e.g., 'you have irregular heartbeat') from personal wearable device (e.g., Apple Watch)   Skipped or extra  beat(s) and occurs 4 or more times per minute    Additional Information   Negative: Heart beating very rapidly (e.g., > 140 / minute) and not present now  (Exception: During exercise.)   Negative: Skipped or extra beat(s) and increases with exercise or exertion   Negative: History of heart disease (i.e., heart attack, bypass surgery, angina, angioplasty)  (Exception: Brief heartbeat symptoms that went away and now feels well.)   Negative: Age > 60 years  (Exception: Brief heartbeat symptoms that went away and now feels well.)   Negative: Taking water pill (i.e., diuretic) or heart medication (e.g., digoxin)   Negative: Patient wants to be seen   Negative: New or worsened shortness of breath with activity (dyspnea on exertion)   Negative: Patient sounds very sick or weak to the triager   Negative: Wearing a 'Holter monitor' or 'cardiac event monitor'   Negative: Received SHOCK from implantable cardiac defibrillator (and now feels well)   Negative: Difficulty breathing   Negative: Dizziness, lightheadedness, or weakness   Negative: Heart beating very rapidly (e.g., > 140 / minute) and present now  (Exception: During exercise.)   Negative: Heart beating very slowly (e.g., < 50 / minute)  (Exception: Athlete and heart rate normal for caller.)   Negative: Chest pain   Negative: Passed out (i.e., lost consciousness, collapsed and was not responding)   Negative: Shock suspected (e.g., cold/pale/clammy skin, too weak to stand, low BP, rapid pulse)   Negative: Difficult to awaken or acting confused (e.g., disoriented, slurred speech)   Negative: Visible sweat on face or sweat dripping down face   Negative: Unable to walk, or can only walk with assistance (e.g., requires support)   Negative: Received SHOCK from implantable cardiac defibrillator and has persisting symptoms (i.e., palpitations, lightheadedness)   Negative: Dizziness, lightheadedness, or weakness and heart beating very rapidly (e.g., > 140 / minute)   Negative:  Dizziness, lightheadedness, or weakness and heart beating very slowly (e.g., < 50 / minute)   Negative: Sounds like a life-threatening emergency to the triager    Protocols used: Heart Rate and Heartbeat [id number]-MARIE MATSON RN 12/5/2023 at 4:38 PM

## 2023-12-06 LAB
ATRIAL RATE - MUSE: 92 BPM
DIASTOLIC BLOOD PRESSURE - MUSE: NORMAL MMHG
INTERPRETATION ECG - MUSE: NORMAL
P AXIS - MUSE: 38 DEGREES
PR INTERVAL - MUSE: 166 MS
QRS DURATION - MUSE: 96 MS
QT - MUSE: 356 MS
QTC - MUSE: 440 MS
R AXIS - MUSE: -2 DEGREES
SYSTOLIC BLOOD PRESSURE - MUSE: NORMAL MMHG
T AXIS - MUSE: 51 DEGREES
VENTRICULAR RATE- MUSE: 92 BPM

## 2023-12-06 NOTE — ED PROVIDER NOTES
History     Chief Complaint:  Palpitations       The history is provided by the patient.      Jim Flores is a 48 year old male on Coumadin who presents with palpitations. Patient reports he has had palpitations intermittently since last night. He describes these as fluttering, and at times go up into his throat. He has had palpitations in the past, but this feels different. Denies fever, chills, cough, vomiting, diarrhea, or swelling in the legs. He adds he had surgery for kidney stones and had a kidney infection which turned into sepsis about one month ago. As well as superficial thrombophlebitis of arm  Patient reports his primary clinic is [facility].    Independent Historian:   None - Patient Only    Review of External Notes:   Reviewed ED note from 10/19/2023.     Medications:    Prednisone  Atorvastatin  Hydroxychloroquine  Venlafaxine  Coumadin    Past Medical History:    Anxiety  Cardiomegaly  Hypercalciuria  Pulmonary infarct  SLE with normal kidneys  Sepsis   Circadian rhythm sleep disorder   Homocystinemia  Homozygous methylenetetrahydrofolate reductase mutation  Panic disorder  PE  Bilateral nephrolithiasis   GERD  Systemic lupus erythematosus   Pericardial effusion  Pleural effusion    Past Surgical History:    Biopsy mole  Colonoscopy  Colonoscopy polyp removal   Combined cystoscopy, retrogrades, ureteroscopy,  laser holmium, lithotripsy, insert stent  Cystoscopy, bilateral ureteroscopy with holmium laser lithotripsy, bilateral ureteroscopy with stone basketing, bilateral retrograde pyelogram, bilateral ureteral stent exchange  Cystoscopy, bilateral ureteroscopy with thulium fiber laser lithotripsy, bilateral ureteroscopy with stone basketing, bilateral retrograde pyelogram, bilateral ureteral stent exchange  Cystourethroscopy with bilateral ureteral stent placement, Bilateral retrograde pyelography  Kidney stone lithotripsy  Parathyroidectomy   Hernia repair    Physical Exam   Patient Vitals  for the past 24 hrs:   BP Temp Temp src Pulse Resp SpO2   12/05/23 2030 (!) 138/91 -- -- -- -- --   12/05/23 1910 -- -- -- -- -- 99 %   12/05/23 1736 (!) 158/128 98.1  F (36.7  C) Temporal 93 20 100 %      Physical Exam  Constitutional: Patient is well appearing. No distress.  Head: Atraumatic.  Eyes: Conjunctivae and EOM are normal. No scleral icterus.  Neck: Normal range of motion. Neck supple.   Cardiovascular: Normal rate, regular rhythm, normal heart sounds and intact distal perfusion.   Pulmonary/Chest: Breath sounds normal. No respiratory distress.  Abdominal: Soft. Bowel sounds are normal. No distension. No tenderness. No rebound or guarding.   Musculoskeletal: Normal range of motion. No edema or tenderness.   Neurological: Alert and orientated to person, place, and time. No observable focal neuro deficit  Skin: Warm and dry. No rash noted. Not diaphoretic.     Emergency Department Course   ECG  ECG taken at 1803, ECG read at 1839  Normal sinus rhythm   Rate 92 bpm. AZ interval 166 ms. QRS duration 96 ms. QT/QTc 356/440 ms. P-R-T axes 38 -2 51.     Imaging:  CT Chest Pulmonary Embolism w Contrast   Final Result   IMPRESSION:   1.  No pulmonary embolus or CT evidence for source of symptoms.   2.  Nephrolithiasis.      XR Chest 2 Views   Final Result   IMPRESSION: Heart is normal in size. Lungs are clear.         Laboratory:  Labs Ordered and Resulted from Time of ED Arrival to Time of ED Departure   INR - Abnormal       Result Value    INR 2.00 (*)    BASIC METABOLIC PANEL - Abnormal    Sodium 136      Potassium 4.5      Chloride 101      Carbon Dioxide (CO2) 28      Anion Gap 7      Urea Nitrogen 13.9      Creatinine 1.10      GFR Estimate 82      Calcium 8.9      Glucose 111 (*)    CBC WITH PLATELETS AND DIFFERENTIAL - Abnormal    WBC Count 5.3      RBC Count 4.84      Hemoglobin 14.0      Hematocrit 41.4      MCV 86      MCH 28.9      MCHC 33.8      RDW 14.1      Platelet Count 236      % Neutrophils 75       % Lymphocytes 13      % Monocytes 9      % Eosinophils 2      % Basophils 1      % Immature Granulocytes 0      NRBCs per 100 WBC 0      Absolute Neutrophils 4.0      Absolute Lymphocytes 0.7 (*)     Absolute Monocytes 0.5      Absolute Eosinophils 0.1      Absolute Basophils 0.1      Absolute Immature Granulocytes 0.0      Absolute NRBCs 0.0     TROPONIN T, HIGH SENSITIVITY - Normal    Troponin T, High Sensitivity 6     TROPONIN T, HIGH SENSITIVITY - Normal    Troponin T, High Sensitivity <6          Emergency Department Course & Assessments:  Interventions:  Medications   sodium chloride 0.9% BOLUS 1,000 mL (1,000 mLs Intravenous $New Bag 12/5/23 1941)   iopamidol (ISOVUE-370) solution 500 mL (60 mLs Intravenous $Given 12/5/23 2011)   sodium chloride (PF) 0.9% PF flush 100 mL (81 mLs Intravenous $Given 12/5/23 2011)      Independent Interpretation (X-rays, CTs, rhythm strip):    Assessments/Consultations/Discussion of Management or Tests:   ED Course as of 12/05/23 2237   Tue Dec 05, 2023   1839 Dr. Helton' evaluation   1935 Rechecked.   2137 Rechecked.       Social Determinants of Health affecting care:   None    Disposition:  The patient was discharged to home.     Impression & Plan    Medical Decision Making:  This patient presents with sensation of palpitations.  The work up in the Emergency Department is negative, monitoring and EKG have not shown any abnormal heart rhythms or concerning morphologies.  The differential diagnosis of palpitations is broad and includes life threatening etiologies such as acute coronary syndrome, myocardial infarction, pulmonary embolism, electrolyte abnormalities, drug reaction, anxiety, amongst others.  No serious etiology for the palpitations were detected today during this visit.  Close follow up with primary care is indicated should the symptoms continue, as further work up may be performed; this was made clear to the patient, who understands.     Diagnosis:    ICD-10-CM     1. Palpitations  R00.2            Discharge Medications:  New Prescriptions    No medications on file      Scribe Disclosure:  I, Lacey SIGRID Ho, am serving as a scribe at 6:46 PM on 12/5/2023 to document services personally performed by Sheng Helton MD based on my observations and the provider's statements to me.    12/5/2023   Sheng Helton MD Stevens, Andrew C, MD  12/05/23 2231

## 2023-12-07 ENCOUNTER — PATIENT OUTREACH (OUTPATIENT)
Dept: CARE COORDINATION | Facility: CLINIC | Age: 48
End: 2023-12-07
Payer: COMMERCIAL

## 2023-12-07 NOTE — LETTER
Jim Flores  [address] [place] [address]    Dear Jim Flores,      I am a team member within the Connected Care Resource Center with [facility]. I recently tried to reach you to ensure you were doing well following a recent visit within our health system. I also wanted to take this chance to introduce Clinic Care Coordination.     Below is a description of Clinic Care Coordination and how this team can further assist you:       The Clinic Care Coordination team is made up of a Registered Nurse, , Financial Resource Worker, and a Community Health Worker who understand and can help navigate the health care system. The goal of clinic care coordination is to help you manage your health, improve access to care, and achieve optimal health outcomes. They work alongside your provider to assist you in determining your health and social needs, obtain health care and community resources, and provide you with necessary information and education. Clinic Care Coordination can work with you through any barriers and develop a care plan that helps coordinate and strengthen the relationship between you and your care team.    If you wish to connect with the Clinic Care Coordination Team, please let your [facility] Primary Care Provider or Clinic Care Team know and they can place a referral. The Clinic Care Coordination team will then reach out by phone to further support you.    We are focused on providing you with the highest-quality healthcare experience possible.    Sincerely,   Your care team with [facility]

## 2023-12-07 NOTE — PROGRESS NOTES
[facility] Care Resource Center Contact  [facility]/Voicemail     Clinical Data: Care Coordination ED-sourced Outreach-     Outreach attempted x 2.  Left message on patient's voicemail, providing [facility]'s 24/7 scheduling and nurse triage phone number [phone] ([phone]) for questions/concerns and/or to schedule an appt with an [facility] provider.      Care Coordination introduction letter with explanation of Clinic Care Coordination services sent to patient via [employer]. Clinic Care Coordination services remain available via referral if needed.    Plan: [facility] will do no further outreaches at this time.       AGUSTIN Green  Connected Care Resource [place], [facility]    *Connected Care Resource Team does NOT follow patient ongoing. Referrals are identified based on internal discharge reports and the outreach is to ensure patient has an understanding of their discharge instructions.

## 2023-12-15 ENCOUNTER — OFFICE VISIT (OUTPATIENT)
Dept: PEDIATRICS | Facility: CLINIC | Age: 48
End: 2023-12-15
Payer: COMMERCIAL

## 2023-12-15 VITALS
WEIGHT: 168.4 LBS | RESPIRATION RATE: 20 BRPM | DIASTOLIC BLOOD PRESSURE: 86 MMHG | SYSTOLIC BLOOD PRESSURE: 118 MMHG | BODY MASS INDEX: 24.94 KG/M2 | OXYGEN SATURATION: 100 % | TEMPERATURE: 97.6 F | HEIGHT: 69 IN | HEART RATE: 87 BPM

## 2023-12-15 DIAGNOSIS — M32.9 SYSTEMIC LUPUS ERYTHEMATOSUS, UNSPECIFIED SLE TYPE, UNSPECIFIED ORGAN INVOLVEMENT STATUS (H): ICD-10-CM

## 2023-12-15 DIAGNOSIS — E21.3 HYPERPARATHYROIDISM (H): ICD-10-CM

## 2023-12-15 DIAGNOSIS — D68.59 HYPERCOAGULATION SYNDROME (H): ICD-10-CM

## 2023-12-15 DIAGNOSIS — Z00.00 ROUTINE GENERAL MEDICAL EXAMINATION AT A HEALTH CARE FACILITY: Primary | ICD-10-CM

## 2023-12-15 DIAGNOSIS — E78.5 HYPERLIPIDEMIA LDL GOAL <130: ICD-10-CM

## 2023-12-15 DIAGNOSIS — F41.1 ANXIETY STATE: ICD-10-CM

## 2023-12-15 PROCEDURE — 99396 PREV VISIT EST AGE 40-64: CPT | Performed by: INTERNAL MEDICINE

## 2023-12-15 RX ORDER — ATORVASTATIN CALCIUM 10 MG/1
10 TABLET, FILM COATED ORAL DAILY
Qty: 90 TABLET | Refills: 4 | Status: SHIPPED | OUTPATIENT
Start: 2023-12-15 | End: 2024-09-04

## 2023-12-15 RX ORDER — PROPRANOLOL HYDROCHLORIDE 10 MG/1
10 TABLET ORAL 2 TIMES DAILY PRN
Qty: 30 TABLET | Refills: 2 | Status: SHIPPED | OUTPATIENT
Start: 2023-12-15 | End: 2024-09-04

## 2023-12-15 RX ORDER — VENLAFAXINE HYDROCHLORIDE 150 MG/1
300 CAPSULE, EXTENDED RELEASE ORAL DAILY
Qty: 180 CAPSULE | Refills: 4 | Status: SHIPPED | OUTPATIENT
Start: 2023-12-15 | End: 2024-09-04

## 2023-12-15 ASSESSMENT — ENCOUNTER SYMPTOMS
HEMATURIA: 0
PARESTHESIAS: 0
EYE PAIN: 0
JOINT SWELLING: 0
DYSURIA: 0
COUGH: 0
MYALGIAS: 0
SHORTNESS OF BREATH: 0
DIZZINESS: 0
WEAKNESS: 0
DIARRHEA: 0
FEVER: 0
NAUSEA: 0
CONSTIPATION: 0
FREQUENCY: 0
HEMATOCHEZIA: 0
ABDOMINAL PAIN: 0
HEARTBURN: 0
PALPITATIONS: 0
NERVOUS/ANXIOUS: 0
HEADACHES: 0
ARTHRALGIAS: 0
SORE THROAT: 0
CHILLS: 0

## 2023-12-15 ASSESSMENT — PAIN SCALES - GENERAL: PAINLEVEL: NO PAIN (0)

## 2023-12-15 NOTE — PROGRESS NOTES
SUBJECTIVE:   Jim is a 48 year old, presenting for the following:  Physical        12/15/2023    10:45 AM   Additional Questions   Roomed by Edie Smith     Healthy Habits:     Getting at least 3 servings of Calcium per day:  Yes    Bi-annual eye exam:  Yes    Dental care twice a year:  Yes    Sleep apnea or symptoms of sleep apnea:  None    Diet:  Low salt, Gluten-free/reduced and Breakfast skipped    Frequency of exercise:  1 day/week    Duration of exercise:  Less than 15 minutes    Taking medications regularly:  Yes    Medication side effects:  None    Additional concerns today:  Yes    Here for CPE. Overall he is feeling well.     Recent ED visit for palpitations. No specific etiology found. No recurrence of sx since that time.    Reviewed his chronic medical conditions.  SLE. Hypercoagulable state d/t SLE. Managed through rheumatology.   Hyperparathyroid. Last PTH level was in the normal range.  Anxiety. Feels mood is well controlled.  Hyperlipidemia. Tolerating meds. Reviewed past lab results.     Social History     Tobacco Use    Smoking status: Never    Smokeless tobacco: Never   Substance Use Topics    Alcohol use: Yes     Alcohol/week: 0.0 standard drinks of alcohol     Comment: three per week           12/15/2023    10:38 AM   Alcohol Use   Prescreen: >3 drinks/day or >7 drinks/week? No       Last PSA:   PSA   Date Value Ref Range Status   08/23/2008 0.75 0 - 4 ug/L Final     Prostate Specific Antigen Screen   Date Value Ref Range Status   11/30/2023 1.31 0.00 - 3.50 ng/mL Final       Reviewed orders with patient. Reviewed health maintenance and updated orders accordingly - Yes        Review of Systems   Constitutional:  Negative for chills and fever.   HENT:  Negative for congestion, ear pain, hearing loss and sore throat.    Eyes:  Negative for pain and visual disturbance.   Respiratory:  Negative for cough and shortness of breath.    Cardiovascular:  Negative for chest pain, palpitations and  "peripheral edema.   Gastrointestinal:  Negative for abdominal pain, constipation, diarrhea, heartburn, hematochezia and nausea.   Genitourinary:  Negative for dysuria, frequency, genital sores, hematuria, impotence, penile discharge and urgency.   Musculoskeletal:  Negative for arthralgias, joint swelling and myalgias.   Skin:  Negative for rash.   Neurological:  Negative for dizziness, weakness, headaches and paresthesias.   Psychiatric/Behavioral:  Negative for mood changes. The patient is not nervous/anxious.          OBJECTIVE:   /86   Pulse 87   Temp 97.6  F (36.4  C) (Tympanic)   Resp 20   Ht 1.76 m (5' 9.29\")   Wt 76.4 kg (168 lb 6.4 oz)   SpO2 100%   BMI 24.66 kg/m      Physical Exam  GENERAL: healthy, alert and no distress  EYES: PERRL, EOMI  HENT: ear canals and TM's normal. No nasal discharge. OP moist.  NECK: no adenopathy  RESP: lungs clear to auscultation - no rales, rhonchi or wheezes  CV: regular rate and rhythm, normal S1 S2, no murmur, no peripheral edema and peripheral pulses strong  ABDOMEN: soft, nontender, bowel sounds normal  MS: no gross musculoskeletal defects noted  SKIN: no suspicious lesions or rashes  NEURO: Normal strength and tone  PSYCH: mentation appears normal, affect normal/bright       ASSESSMENT/PLAN:       ICD-10-CM    1. Routine general medical examination at a health care facility  Z00.00       2. Systemic lupus erythematosus, unspecified SLE type, unspecified organ involvement status (H)  M32.9       3. Hypercoagulation syndrome (H24)  D68.59       4. Hyperparathyroidism (H24)  E21.3       5. Anxiety state  F41.1 venlafaxine (EFFEXOR XR) 150 MG 24 hr capsule     propranolol (INDERAL) 10 MG tablet      6. Hyperlipidemia LDL goal <130  E78.5 atorvastatin (LIPITOR) 10 MG tablet        Overall feeling well  For now, will monitor palpitation sx. If recurrent plan zio patch.  No changes w/ routine meds.  Continue w/ specialty follow-up for his other med issues. " "      COUNSELING:   Reviewed preventive health counseling, as reflected in patient instructions      BMI:   Estimated body mass index is 24.66 kg/m  as calculated from the following:    Height as of this encounter: 1.76 m (5' 9.29\").    Weight as of this encounter: 76.4 kg (168 lb 6.4 oz).         He reports that he has never smoked. He has never used smokeless tobacco.      Good Mooney MD  [facility]  "

## 2024-01-02 ENCOUNTER — TRANSFERRED RECORDS (OUTPATIENT)
Dept: HEALTH INFORMATION MANAGEMENT | Facility: CLINIC | Age: 49
End: 2024-01-02
Payer: COMMERCIAL

## 2024-01-12 ENCOUNTER — HOSPITAL ENCOUNTER (OUTPATIENT)
Dept: ULTRASOUND IMAGING | Facility: CLINIC | Age: 49
Discharge: HOME | End: 2024-01-12
Attending: STUDENT IN AN ORGANIZED HEALTH CARE EDUCATION/TRAINING PROGRAM
Payer: COMMERCIAL

## 2024-01-12 ENCOUNTER — HOSPITAL ENCOUNTER (OUTPATIENT)
Dept: GENERAL RADIOLOGY | Facility: CLINIC | Age: 49
Discharge: HOME | End: 2024-01-12
Attending: STUDENT IN AN ORGANIZED HEALTH CARE EDUCATION/TRAINING PROGRAM
Payer: COMMERCIAL

## 2024-01-12 DIAGNOSIS — N20.0 BILATERAL NEPHROLITHIASIS: ICD-10-CM

## 2024-01-12 PROCEDURE — 74018 RADEX ABDOMEN 1 VIEW: CPT

## 2024-01-12 PROCEDURE — 76770 US EXAM ABDO BACK WALL COMP: CPT

## 2024-01-29 ENCOUNTER — VIRTUAL VISIT (OUTPATIENT)
Dept: UROLOGY | Facility: CLINIC | Age: 49
End: 2024-01-29
Payer: COMMERCIAL

## 2024-01-29 VITALS — WEIGHT: 165 LBS | BODY MASS INDEX: 24.16 KG/M2

## 2024-01-29 DIAGNOSIS — N20.0 BILATERAL NEPHROLITHIASIS: ICD-10-CM

## 2024-01-29 DIAGNOSIS — R82.991 HYPOCITRATURIA: Primary | ICD-10-CM

## 2024-01-29 PROCEDURE — 99214 OFFICE O/P EST MOD 30 MIN: CPT | Mod: 95 | Performed by: PHYSICIAN ASSISTANT

## 2024-01-29 RX ORDER — POTASSIUM CITRATE 10 MEQ/1
20 TABLET, EXTENDED RELEASE ORAL 2 TIMES DAILY WITH MEALS
Qty: 120 TABLET | Refills: 11 | Status: SHIPPED | OUTPATIENT
Start: 2024-01-29

## 2024-01-29 ASSESSMENT — PAIN SCALES - GENERAL: PAINLEVEL: NO PAIN (0)

## 2024-01-29 NOTE — LETTER
1/29/2024       RE: Jim Flores  [address] [place] [address]     Dear Colleague,    Thank you for referring your patient, Jim Flores, to the [facility] [place] at [facility]. Please see a copy of my visit note below.    Virtual Visit Details    Type of service:  Video Visit     Originating Location (pt. Location): Home    Distant Location (provider location):  On-site  Platform used for Video Visit: Arvind    ON: 1429  OFF: 1453    Subjective     REQUESTING PROVIDER   No ref. provider found     REASON FOR CONSULT   Follow up on kidney stones-Litholink, US and KUB    HISTORY OF PRESENT ILLNESS   Mr. Flores is very pleasant 48 year old year old male, who presents today For follow-up regarding nephrolithiasis.  He has a history of calcium oxalate and calcium phosphate stones.  He has a history of recurrent nephrolithiasis.  He has probably passed greater than 100 stones over the years.  He previously underwent parathyroidectomy for hypercalciuria.  However, he continues to form stones.  He required bilateral ureteral stent placement in September 2023.  On 09/25/1933, patient underwent ureteroscopy with Dr. Barrientos.  He had a repeat ureteroscopy on 10/10/2023 and stent was left on a dangle.    Patient has previously undergone metabolic evaluation.  It was recommended that he eat this.  He did complete KUB and renal ultrasound imaging prior to this visit.    Stone composition from his last procedure again showed calcium oxalate and calcium phosphate.  KUB shows bilateral nephrolithiasis.  Renal ultrasound imaging shows no hydronephrosis but concern for bilateral stones.    Patient believes he recently passed a stone as soon as last week.  Currently denies flank pain or hematuria.    The following portions of the patient's history were reviewed and updated as appropriate: allergies, current medications, and problem list.     REVIEW OF SYSTEMS    Review of Systems   Constitutional:  Negative for chills and fever.   Gastrointestinal:  Negative for nausea and vomiting.   Genitourinary:  Negative for flank pain and hematuria.      Per HPI.     Patient Active Problem List   Diagnosis    Anxiety state    History of pulmonary embolism    Systemic lupus erythematosus (H)    Chronic rhinitis    GERD (gastroesophageal reflux disease)    Ureterolithiasis    Hyperparathyroidism (H24)    Hypercoagulation syndrome (H24)    Cardiomegaly    Pericardial effusion    Pleural effusion on left    Right ureteral stone    Chest pain, unspecified type    Bilateral nephrolithiasis        Past Surgical History:   Procedure Laterality Date    BIOPSY  2014, 2021    Mole check - all good    COLONOSCOPY  11/19/2012    Procedure: COLONOSCOPY;  Colonoscopy;  Surgeon: Lupe Ratliff MD;  Location: [facility]    COLONOSCOPY Left 4/12/2022    Procedure: COLONOSCOPY, FLEXIBLE, WITH POLYP REMOVAL USING HOT SNARE;  Surgeon: Lupe Ratliff MD;  Location: [facility]    COMBINED CYSTOSCOPY, RETROGRADES, URETEROSCOPY, LASER HOLMIUM LITHOTRIPSY URETER(S), INSERT STENT Left 6/24/2015    Procedure: COMBINED CYSTOSCOPY, RETROGRADES, URETEROSCOPY, LASER HOLMIUM LITHOTRIPSY URETER(S), INSERT STENT;  Surgeon: Ramesh Johnson MD;  Location: [facility]    COMBINED CYSTOSCOPY, RETROGRADES, URETEROSCOPY, LASER HOLMIUM LITHOTRIPSY URETER(S), INSERT STENT Bilateral 9/25/2023    Procedure: Cystoscopy, bilateral ureteroscopy with holmium laser lithotripsy, bilateral ureteroscopy with stone basketing, bilateral retrograde pyelogram, bilateral ureteral stent exchange;  Surgeon: Sheng Barrientos MD;  Location: [facility]    COMBINED CYSTOSCOPY, RETROGRADES, URETEROSCOPY, LASER HOLMIUM LITHOTRIPSY URETER(S), INSERT STENT Bilateral 10/10/2023    Procedure: Cystoscopy, bilateral ureteroscopy with thulium fiber laser lithotripsy, bilateral ureteroscopy with stone basketing, bilateral retrograde pyelogram, bilateral  ureteral stent exchange, fluoroscopic interpretation <1 hour physician time;  Surgeon: Sheng Barrientos MD;  Location: [facility] OR    CYSTOSCOPY, RETROGRADES, INSERT STENT URETER(S), COMBINED Bilateral 9/7/2023    Procedure: 1. Cystourethroscopy with bilateral ureteral stent placement 2. Bilateral retrograde pyelography with interpretation of intraoperative fluoroscopic imaging;  Surgeon: Demetris Castillo MD;  Location: [facility] OR    GENITOURINARY SURGERY  2015 2018    kidney stone lithotripsy    HEAD & NECK SURGERY  2018    parathyroidectomy    [facility] stone remov      at [place] March 12 2018    LASER HOLMIUM LITHOTRIPSY URETER(S), INSERT STENT, COMBINED Right 11/11/2015    Procedure: COMBINED CYSTOSCOPY, URETEROSCOPY, LASER HOLMIUM LITHOTRIPSY URETER(S), INSERT STENT;  Surgeon: Ramesh Johnson MD;  Location: [facility]    ZZHC REPAIR INCISIONAL HERNIA,REDUCIBLE  1991    Hernia Repair, Incisional, Unilateral        Objective     PHYSICAL EXAM   GENERAL: alert and no distress  EYES: Eyes grossly normal to inspection.  No discharge or erythema, or obvious scleral/conjunctival abnormalities.  HENT: Normal cephalic/atraumatic.  External ears, nose and mouth without ulcers or lesions.  No nasal drainage visible.  NECK: No asymmetry, visible masses or scars  RESP: No audible wheeze, cough, or visible cyanosis.    MS: No gross musculoskeletal defects noted.  Normal range of motion.  No visible edema.  SKIN: Visible skin clear. No significant rash, abnormal pigmentation or lesions.  NEURO: Cranial nerves grossly intact.  Mentation and speech appropriate for age.  PSYCH: Appropriate affect, tone, and pace of words     LABORATORY   Lab Results   Component Value Date    PSA 1.31 11/30/2023    PSA 0.75 08/23/2008    CR 1.10 12/05/2023        Most recent stone analysis 40% calcium oxalate monohydrate, 20% calcium oxalate dihydrate, and 40% calcium phosphate.    IMAGING     I personally reviewed the images.     XR KUB    Result  Date: 1/12/2024  KUB   1/12/2024 9:08 AM HISTORY: Bilateral nephrolithiasis. COMPARISON: CT 9/29/2023.     IMPRESSION: A few tiny bilateral focal radiodensities could be intrarenal small stones. Example on the left at the upper kidney is 3 mm. Another small example at the lower left kidney measuring 2 mm. Example at right mid to low kidney is 2 mm. Nonobstructive bowel. Moderate stool in colon. No free air. SUNG WASHINGTON MD   SYSTEM ID:  [id number]    US Renal Complete Non-Vascular    Result Date: 1/12/2024  US RENAL COMPLETE NON-VASCULAR 1/12/2024 9:43 AM CLINICAL HISTORY: Bilateral nephrolithiasis TECHNIQUE: Routine Bilateral Renal and Bladder Ultrasound. COMPARISON: 9/29/2023, KUB today FINDINGS: RIGHT KIDNEY: 10.3 x 5.4 x 5.2 cm. No hydronephrosis or masses. Small shadowing calculus in the interpolar region. LEFT KIDNEY: 12.7 x 4.5 x 5.2 cm. No hydronephrosis or masses. Small echogenic focus at the lower pole, likely a stone. BLADDER: Partially distended. Bilateral ureteral jets are present within the urinary bladder.     IMPRESSION: 1.  No hydronephrosis in either kidney. 2.  Small echogenic foci in the kidneys bilaterally, likely small nonobstructing calculi. ANIKA JUSTICE MD   SYSTEM ID:  [id number]     TESTING    Litholink 12/05/23:    Urine volume 2.04 liters per day; supersaturation calcium oxalate 4.25; urine calcium 117 mg/day; urine oxalate 32 mg/day; urine citrate 437 mg/day; supersaturation calcium phosphate 0.98; 24-hour urine pH 6.433; supersaturation uric acid 0.26; urine uric acid 0.611 g/day.    Urine citrate is low and has fallen.  Urine pH is persistently highly to borderline extent.  Despite this, calcium phosphate stone risk is not elevated.  Urine sodium and animal protein are within normal limits.    Assessment & Plan   1. Hypocitraturia    2. Bilateral nephrolithiasis         I had the pleasure today of meeting with Mr. Flores to discuss his follow-up for nephrolithiasis.  Renal ultrasound  imaging shows no residual hydronephrosis after patient has undergone ureteroscopy.  There are findings consistent with likely small stones in each kidney.  This was also noted on KUB imaging.    -Patient should have follow-up CT ab pelvis without contrast and return visit in January 2025 for kidney stone monitoring.    We then reviewed his Litholink results.  Patient's urine volume is likely lower than we would like at 2.04 L.  His urine pH is elevated, but his supersaturation for calcium phosphate stones is not particularly elevated despite this.  He patient continues to have low urine citrate, which was noted on previous Litholinks.  It has fallen even lower than before.  We discussed possible dietary changes to try to increase citrate.  Patient notes he already ingests a lot of these foods and would be interested in possible medication management with potassium citrate.  We did discuss the concern that this will raise his he ate and will need to compensate for this with increased hydration.  We also discussed that his calcium is okay at this time as well as is his sodium and animal protein intake.    -Plan on increasing water intake to get to urine output of 2.5 to 3 L/day.    -Will plan on starting potassium citrate 20 mEq twice daily.  We discussed maximum dosage of 60 mEq daily.  Most common side effects include diarrhea and GI upset.    -Dietary changes provided to raise citrate.    -Continue with current animal protein, sodium, and oxalate intake for the time being.    -Follow-up with Litholink x 1 and return in approximately 3 months.    Copy of patient's Litholink mailed to him.    Signed by:     Charissa Guerra PA-C 1/31/2024 5:06 PM

## 2024-01-29 NOTE — PROGRESS NOTES
Virtual Visit Details    Type of service:  Video Visit     Originating Location (pt. Location): Home    Distant Location (provider location):  On-site  Platform used for Video Visit: Arvind    ON: 1429  OFF: 1453    Subjective      REQUESTING PROVIDER   No ref. provider found     REASON FOR CONSULT   Follow up on kidney stones-[facility]olink, US and KUB    HISTORY OF PRESENT ILLNESS   Mr. Flores is very pleasant 48 year old year old male, who presents today For follow-up regarding nephrolithiasis.  He has a history of calcium oxalate and calcium phosphate stones.  He has a history of recurrent nephrolithiasis.  He has probably passed greater than 100 stones over the years.  He previously underwent parathyroidectomy for hypercalciuria.  However, he continues to form stones.  He required bilateral ureteral stent placement in September 2023.  On 09/25/1933, patient underwent ureteroscopy with Dr. Barrientos.  He had a repeat ureteroscopy on 10/10/2023 and stent was left on a dangle.    Patient has previously undergone metabolic evaluation.  It was recommended that he eat this.  He did complete KUB and renal ultrasound imaging prior to this visit.    Stone composition from his last procedure again showed calcium oxalate and calcium phosphate.  KUB shows bilateral nephrolithiasis.  Renal ultrasound imaging shows no hydronephrosis but concern for bilateral stones.    Patient believes he recently passed a stone as soon as last week.  Currently denies flank pain or hematuria.    The following portions of the patient's history were reviewed and updated as appropriate: allergies, current medications, and problem list.     REVIEW OF SYSTEMS   Review of Systems   Constitutional:  Negative for chills and fever.   Gastrointestinal:  Negative for nausea and vomiting.   Genitourinary:  Negative for flank pain and hematuria.      Per HPI.     Patient Active Problem List   Diagnosis    Anxiety state    History of pulmonary embolism    Systemic  lupus erythematosus (H)    Chronic rhinitis    GERD (gastroesophageal reflux disease)    Ureterolithiasis    Hyperparathyroidism (H24)    Hypercoagulation syndrome (H24)    Cardiomegaly    Pericardial effusion    Pleural effusion on left    Right ureteral stone    Chest pain, unspecified type    Bilateral nephrolithiasis        Past Surgical History:   Procedure Laterality Date    BIOPSY  2014, 2021    Mole check - all good    COLONOSCOPY  11/19/2012    Procedure: COLONOSCOPY;  Colonoscopy;  Surgeon: Lupe Ratliff MD;  Location: [facility]    COLONOSCOPY Left 4/12/2022    Procedure: COLONOSCOPY, FLEXIBLE, WITH POLYP REMOVAL USING HOT SNARE;  Surgeon: Lupe Ratliff MD;  Location: [facility]    COMBINED CYSTOSCOPY, RETROGRADES, URETEROSCOPY, LASER HOLMIUM LITHOTRIPSY URETER(S), INSERT STENT Left 6/24/2015    Procedure: COMBINED CYSTOSCOPY, RETROGRADES, URETEROSCOPY, LASER HOLMIUM LITHOTRIPSY URETER(S), INSERT STENT;  Surgeon: Ramesh Johnson MD;  Location: [facility]    COMBINED CYSTOSCOPY, RETROGRADES, URETEROSCOPY, LASER HOLMIUM LITHOTRIPSY URETER(S), INSERT STENT Bilateral 9/25/2023    Procedure: Cystoscopy, bilateral ureteroscopy with holmium laser lithotripsy, bilateral ureteroscopy with stone basketing, bilateral retrograde pyelogram, bilateral ureteral stent exchange;  Surgeon: Sheng Barrientos MD;  Location: [facility]    COMBINED CYSTOSCOPY, RETROGRADES, URETEROSCOPY, LASER HOLMIUM LITHOTRIPSY URETER(S), INSERT STENT Bilateral 10/10/2023    Procedure: Cystoscopy, bilateral ureteroscopy with thulium fiber laser lithotripsy, bilateral ureteroscopy with stone basketing, bilateral retrograde pyelogram, bilateral ureteral stent exchange, fluoroscopic interpretation <1 hour physician time;  Surgeon: Sheng Barrientos MD;  Location: [facility]    CYSTOSCOPY, RETROGRADES, INSERT STENT URETER(S), COMBINED Bilateral 9/7/2023    Procedure: 1. Cystourethroscopy with bilateral ureteral stent placement 2. Bilateral retrograde  pyelography with interpretation of intraoperative fluoroscopic imaging;  Surgeon: Demetris Castillo MD;  Location: [facility]    GENITOURINARY SURGERY  2015 2018    kidney stone lithotripsy    HEAD & NECK SURGERY  2018    parathyroidectomy    kidnet stone remov      at [facility] March 12 2018    LASER HOLMIUM LITHOTRIPSY URETER(S), INSERT STENT, COMBINED Right 11/11/2015    Procedure: COMBINED CYSTOSCOPY, URETEROSCOPY, LASER HOLMIUM LITHOTRIPSY URETER(S), INSERT STENT;  Surgeon: Ramesh Johnson MD;  [facility]: [facility]    ZZHC REPAIR INCISIONAL HERNIA,REDUCIBLE  1991    Hernia Repair, Incisional, Unilateral        Objective      PHYSICAL EXAM   GENERAL: alert and no distress  EYES: Eyes grossly normal to inspection.  No discharge or erythema, or obvious scleral/conjunctival abnormalities.  HENT: Normal cephalic/atraumatic.  External ears, nose and mouth without ulcers or lesions.  No nasal drainage visible.  NECK: No asymmetry, visible masses or scars  RESP: No audible wheeze, cough, or visible cyanosis.    MS: No gross musculoskeletal defects noted.  Normal range of motion.  No visible edema.  SKIN: Visible skin clear. No significant rash, abnormal pigmentation or lesions.  NEURO: Cranial nerves grossly intact.  Mentation and speech appropriate for age.  PSYCH: Appropriate affect, tone, and pace of words     LABORATORY   Lab Results   Component Value Date    PSA 1.31 11/30/2023    PSA 0.75 08/23/2008    CR 1.10 12/05/2023        Most recent stone analysis 40% calcium oxalate monohydrate, 20% calcium oxalate dihydrate, and 40% calcium phosphate.    IMAGING     I personally reviewed the images.     XR KUB    Result Date: 1/12/2024  KUB   1/12/2024 9:08 AM HISTORY: Bilateral nephrolithiasis. COMPARISON: CT 9/29/2023.     IMPRESSION: A few tiny bilateral focal radiodensities could be intrarenal small stones. Example on the left at the upper kidney is 3 mm. Another small example at the lower left kidney measuring 2  mm. Example at right mid to low kidney is 2 mm. Nonobstructive bowel. Moderate stool in colon. No free air. SUNG WASHINGTON MD   SYSTEM ID:  [id number]    US Renal Complete Non-Vascular    Result Date: 1/12/2024  US RENAL COMPLETE NON-VASCULAR 1/12/2024 9:43 AM CLINICAL HISTORY: Bilateral nephrolithiasis TECHNIQUE: Routine Bilateral Renal and Bladder Ultrasound. COMPARISON: 9/29/2023, KUB today FINDINGS: RIGHT KIDNEY: 10.3 x 5.4 x 5.2 cm. No hydronephrosis or masses. Small shadowing calculus in the interpolar region. LEFT KIDNEY: 12.7 x 4.5 x 5.2 cm. No hydronephrosis or masses. Small echogenic focus at the lower pole, likely a stone. BLADDER: Partially distended. Bilateral ureteral jets are present within the urinary bladder.     IMPRESSION: 1.  No hydronephrosis in either kidney. 2.  Small echogenic foci in the kidneys bilaterally, likely small nonobstructing calculi. ANIKA JUSTICE MD   SYSTEM ID:  [id number]     TESTING    Lith[facility]ink 12/05/23:    Urine volume 2.04 liters per day; supersaturation calcium oxalate 4.25; urine calcium 117 mg/day; urine oxalate 32 mg/day; urine citrate 437 mg/day; supersaturation calcium phosphate 0.98; 24-hour urine pH 6.433; supersaturation uric acid 0.26; urine uric acid 0.611 g/day.    Urine citrate is low and has fallen.  Urine pH is persistently highly to borderline extent.  Despite this, calcium phosphate stone risk is not elevated.  Urine sodium and animal protein are within normal limits.    Assessment & Plan    1. Hypocitraturia    2. Bilateral nephrolithiasis         I had the pleasure today of meeting with Mr. Flores to discuss his follow-up for nephrolithiasis.  Renal ultrasound imaging shows no residual hydronephrosis after patient has undergone ureteroscopy.  There are findings consistent with likely small stones in each kidney.  This was also noted on KUB imaging.    -Patient should have follow-up CT ab pelvis without contrast and return visit in January 2025 for kidney stone  monitoring.    We then reviewed his Litholink results.  Patient's urine volume is likely lower than we would like at 2.04 L.  His urine pH is elevated, but his supersaturation for calcium phosphate stones is not particularly elevated despite this.  He patient continues to have low urine citrate, which was noted on previous Litholinks.  It has fallen even lower than before.  We discussed possible dietary changes to try to increase citrate.  Patient notes he already ingests a lot of these foods and would be interested in possible medication management with potassium citrate.  We did discuss the concern that this will raise his he ate and will need to compensate for this with increased hydration.  We also discussed that his calcium is okay at this time as well as is his sodium and animal protein intake.    -Plan on increasing water intake to get to urine output of 2.5 to 3 L/day.    -Will plan on starting potassium citrate 20 mEq twice daily.  We discussed maximum dosage of 60 mEq daily.  Most common side effects include diarrhea and GI upset.    -Dietary changes provided to raise citrate.    -Continue with current animal protein, sodium, and oxalate intake for the time being.    -Follow-up with Litholink x 1 and return in approximately 3 months.    Copy of patient's Litholink mailed to him.    Signed by:     Charissa Guerra PA-C 1/31/2024 5:06 PM

## 2024-01-29 NOTE — NURSING NOTE
Is the patient currently in the state of [place]? YES    Visit mode:VIDEO    If the visit is dropped, the patient can be reconnected by: VIDEO VISIT: Text to cell phone:   Telephone Information:   Mobile [phone]       Will anyone else be joining the visit? NO  (If patient encounters technical issues they should call [phone]    How would you like to obtain your AVS? MyChart    Are changes needed to the allergy or medication list? No    Reason for visit: RECHECK    Madeleine FLETCHER

## 2024-01-31 ASSESSMENT — ENCOUNTER SYMPTOMS
VOMITING: 0
NAUSEA: 0
HEMATURIA: 0
FEVER: 0
CHILLS: 0
FLANK PAIN: 0

## 2024-02-02 NOTE — PATIENT INSTRUCTIONS
-Patient should have follow-up CT ab pelvis without contrast and return visit in January 2025 for kidney stone monitoring.    -Plan on increasing water intake to get to urine output of 2.5 to 3 L/day.    -Will plan on starting potassium citrate 20 mEq twice daily.  We discussed maximum dosage of 60 mEq daily.  Most common side effects include diarrhea and GI upset.    -Sometimes our patients want to increase their potassium or citrate with food. I reviewed these foods on the [place] oxalate list to make sure they are OK in terms of oxalate levels.    The first line - higher alkali - is foods that should be higher in organic anions that can help with urinary citrate - they are not high in potassium though.    The second line - high potassium, should also help with citrate because of organic anions that are linked with potassium.    Higher alkali (can increase citrate): cauliflower, apples, lettuce, peaches, pears, strawberries, zucchini    Potassium > 200 mg per serving:  Apricot, banana, cantaloupe, honeydew, rodolfo, nectarine, papaya, prune juice, raisins, squash, artichoke, red kidney beans, mung beans, broccoli, cooked carrots, mustard greens, bok timo, chinese cabbage, collards, mushrooms, peas, seaweed, tomatoes     -Continue with current animal protein, sodium, and oxalate intake for the time being.    -Follow-up with [facility] x 1 and return in approximately 3 months.    Contact us in the interim with questions, concerns, or changes in symptomatology.  [phone]

## 2024-02-06 ENCOUNTER — TELEPHONE (OUTPATIENT)
Dept: UROLOGY | Facility: CLINIC | Age: 49
End: 2024-02-06
Payer: COMMERCIAL

## 2024-02-06 NOTE — TELEPHONE ENCOUNTER
----- Message from Fiona Asencio sent at 2/6/2024  9:29 AM CST -----  Regarding: 3 month follow up  Marleyk x1 and return in 3 months    [facility]  1/29/24

## 2024-02-06 NOTE — TELEPHONE ENCOUNTER
----- Message from Fiona Asencio sent at 2/6/2024  9:29 AM CST -----  Regarding: 3 month follow up  Marleyk x1 and return in 3 months    [facility]  1/29/24

## 2024-02-22 ENCOUNTER — TRANSFERRED RECORDS (OUTPATIENT)
Dept: HEALTH INFORMATION MANAGEMENT | Facility: CLINIC | Age: 49
End: 2024-02-22
Payer: COMMERCIAL

## 2024-02-23 ENCOUNTER — LAB (OUTPATIENT)
Dept: LAB | Facility: CLINIC | Age: 49
End: 2024-02-23
Payer: COMMERCIAL

## 2024-02-23 DIAGNOSIS — M32.9 SLE (SYSTEMIC LUPUS ERYTHEMATOSUS) (H): Primary | ICD-10-CM

## 2024-02-23 LAB
ALBUMIN SERPL BCG-MCNC: 4.4 G/DL (ref 3.5–5.2)
ALT SERPL W P-5'-P-CCNC: 43 U/L (ref 0–70)
AST SERPL W P-5'-P-CCNC: 39 U/L (ref 0–45)
BASOPHILS # BLD AUTO: 0 10E3/UL (ref 0–0.2)
BASOPHILS NFR BLD AUTO: 1 %
CK SERPL-CCNC: 192 U/L (ref 39–308)
CRP SERPL-MCNC: <3 MG/L
EOSINOPHIL # BLD AUTO: 0.1 10E3/UL (ref 0–0.7)
EOSINOPHIL NFR BLD AUTO: 2 %
ERYTHROCYTE [DISTWIDTH] IN BLOOD BY AUTOMATED COUNT: 13.3 % (ref 10–15)
ERYTHROCYTE [SEDIMENTATION RATE] IN BLOOD BY WESTERGREN METHOD: 3 MM/HR (ref 0–20)
HCT VFR BLD AUTO: 45.6 % (ref 40–53)
HGB BLD-MCNC: 14.2 G/DL (ref 13.3–17.7)
IMM GRANULOCYTES # BLD: 0 10E3/UL
IMM GRANULOCYTES NFR BLD: 0 %
LYMPHOCYTES # BLD AUTO: 0.5 10E3/UL (ref 0.8–5.3)
LYMPHOCYTES NFR BLD AUTO: 10 %
MCH RBC QN AUTO: 26.4 PG (ref 26.5–33)
MCHC RBC AUTO-ENTMCNC: 31.1 G/DL (ref 31.5–36.5)
MCV RBC AUTO: 85 FL (ref 78–100)
MONOCYTES # BLD AUTO: 0.3 10E3/UL (ref 0–1.3)
MONOCYTES NFR BLD AUTO: 7 %
NEUTROPHILS # BLD AUTO: 3.7 10E3/UL (ref 1.6–8.3)
NEUTROPHILS NFR BLD AUTO: 80 %
PLATELET # BLD AUTO: 211 10E3/UL (ref 150–450)
RBC # BLD AUTO: 5.38 10E6/UL (ref 4.4–5.9)
WBC # BLD AUTO: 4.6 10E3/UL (ref 4–11)

## 2024-02-23 PROCEDURE — 84450 TRANSFERASE (AST) (SGOT): CPT

## 2024-02-23 PROCEDURE — 84460 ALANINE AMINO (ALT) (SGPT): CPT

## 2024-02-23 PROCEDURE — 82085 ASSAY OF ALDOLASE: CPT | Mod: 90

## 2024-02-23 PROCEDURE — 85025 COMPLETE CBC W/AUTO DIFF WBC: CPT

## 2024-02-23 PROCEDURE — 36415 COLL VENOUS BLD VENIPUNCTURE: CPT

## 2024-02-23 PROCEDURE — 82040 ASSAY OF SERUM ALBUMIN: CPT

## 2024-02-23 PROCEDURE — 86140 C-REACTIVE PROTEIN: CPT

## 2024-02-23 PROCEDURE — 82550 ASSAY OF CK (CPK): CPT

## 2024-02-23 PROCEDURE — 99000 SPECIMEN HANDLING OFFICE-LAB: CPT

## 2024-02-23 PROCEDURE — 85652 RBC SED RATE AUTOMATED: CPT

## 2024-02-24 LAB — ALDOLASE SERPL-CCNC: 4.3 U/L

## 2024-03-18 ENCOUNTER — MYC MEDICAL ADVICE (OUTPATIENT)
Dept: PEDIATRICS | Facility: CLINIC | Age: 49
End: 2024-03-18
Payer: COMMERCIAL

## 2024-03-18 DIAGNOSIS — J31.0 CHRONIC RHINITIS: ICD-10-CM

## 2024-03-19 RX ORDER — FLUTICASONE PROPIONATE 50 MCG
1-2 SPRAY, SUSPENSION (ML) NASAL DAILY
Qty: 48 G | Refills: 3 | Status: SHIPPED | OUTPATIENT
Start: 2024-03-19

## 2024-05-09 ENCOUNTER — LAB (OUTPATIENT)
Dept: LAB | Facility: CLINIC | Age: 49
End: 2024-05-09
Payer: COMMERCIAL

## 2024-05-09 DIAGNOSIS — Z79.899 OTHER LONG TERM (CURRENT) DRUG THERAPY: Primary | ICD-10-CM

## 2024-05-09 DIAGNOSIS — I32 PERICARDITIS IN DISEASES CLASSIFIED ELSEWHERE: ICD-10-CM

## 2024-05-09 DIAGNOSIS — Z79.899 OTHER LONG TERM (CURRENT) DRUG THERAPY: ICD-10-CM

## 2024-05-09 DIAGNOSIS — D68.59 HYPERCOAGULABLE STATE (H): ICD-10-CM

## 2024-05-09 DIAGNOSIS — D68.59 HYPERCOAGULABLE STATE (H): Primary | ICD-10-CM

## 2024-05-09 LAB
BASOPHILS # BLD AUTO: 0.1 10E3/UL (ref 0–0.2)
BASOPHILS NFR BLD AUTO: 1 %
EOSINOPHIL # BLD AUTO: 0.1 10E3/UL (ref 0–0.7)
EOSINOPHIL NFR BLD AUTO: 2 %
ERYTHROCYTE [DISTWIDTH] IN BLOOD BY AUTOMATED COUNT: 14.2 % (ref 10–15)
HCT VFR BLD AUTO: 42.9 % (ref 40–53)
HGB BLD-MCNC: 14.1 G/DL (ref 13.3–17.7)
IMM GRANULOCYTES # BLD: 0 10E3/UL
IMM GRANULOCYTES NFR BLD: 0 %
LYMPHOCYTES # BLD AUTO: 0.9 10E3/UL (ref 0.8–5.3)
LYMPHOCYTES NFR BLD AUTO: 17 %
MCH RBC QN AUTO: 27.5 PG (ref 26.5–33)
MCHC RBC AUTO-ENTMCNC: 32.9 G/DL (ref 31.5–36.5)
MCV RBC AUTO: 84 FL (ref 78–100)
MONOCYTES # BLD AUTO: 0.5 10E3/UL (ref 0–1.3)
MONOCYTES NFR BLD AUTO: 11 %
NEUTROPHILS # BLD AUTO: 3.6 10E3/UL (ref 1.6–8.3)
NEUTROPHILS NFR BLD AUTO: 70 %
PLATELET # BLD AUTO: 206 10E3/UL (ref 150–450)
RBC # BLD AUTO: 5.12 10E6/UL (ref 4.4–5.9)
WBC # BLD AUTO: 5.2 10E3/UL (ref 4–11)

## 2024-05-09 PROCEDURE — 83090 ASSAY OF HOMOCYSTEINE: CPT

## 2024-05-09 PROCEDURE — 85025 COMPLETE CBC W/AUTO DIFF WBC: CPT

## 2024-05-09 PROCEDURE — 36415 COLL VENOUS BLD VENIPUNCTURE: CPT

## 2024-05-09 PROCEDURE — 85260 CLOT FACTOR X STUART-POWER: CPT

## 2024-05-09 PROCEDURE — 84460 ALANINE AMINO (ALT) (SGPT): CPT

## 2024-05-09 PROCEDURE — 84450 TRANSFERASE (AST) (SGOT): CPT

## 2024-05-09 PROCEDURE — 82565 ASSAY OF CREATININE: CPT

## 2024-05-10 LAB
ALT SERPL W P-5'-P-CCNC: 47 U/L (ref 0–70)
AST SERPL W P-5'-P-CCNC: 43 U/L (ref 0–45)
CREAT SERPL-MCNC: 1.17 MG/DL (ref 0.67–1.17)
EGFRCR SERPLBLD CKD-EPI 2021: 76 ML/MIN/1.73M2
FACT X ACT/NOR PPP CHRO: 35 % (ref 70–130)
HCYS SERPL-SCNC: 11.3 UMOL/L (ref 0–15)

## 2024-05-16 DIAGNOSIS — Z79.899 OTHER LONG TERM (CURRENT) DRUG THERAPY: Primary | ICD-10-CM

## 2024-07-01 ENCOUNTER — OFFICE VISIT (OUTPATIENT)
Dept: URGENT CARE | Facility: URGENT CARE | Age: 49
End: 2024-07-01
Payer: COMMERCIAL

## 2024-07-01 VITALS
TEMPERATURE: 97.7 F | OXYGEN SATURATION: 98 % | DIASTOLIC BLOOD PRESSURE: 94 MMHG | HEART RATE: 84 BPM | SYSTOLIC BLOOD PRESSURE: 137 MMHG

## 2024-07-01 DIAGNOSIS — K11.8 PAIN OF PAROTID GLAND: Primary | ICD-10-CM

## 2024-07-01 PROCEDURE — 99213 OFFICE O/P EST LOW 20 MIN: CPT | Performed by: FAMILY MEDICINE

## 2024-07-01 RX ORDER — CYANOCOBALAMIN (VITAMIN B-12) 500 MCG
TABLET ORAL
COMMUNITY

## 2024-07-01 RX ORDER — METHOTREXATE SODIUM 2.5 MG/1
TABLET ORAL WEEKLY
COMMUNITY
End: 2024-09-04

## 2024-07-01 NOTE — PROGRESS NOTES
Assessment & Plan     Pain of parotid gland     Given his history of Sjogren's has increased chance of salivary stones.   Massaging and examining stensen's duct did not reveal pus or any stones. Parotid gland is not enlarged/red or painful    No evidence of AOM, otitis externa/mastoiditis/TMJ /dental abscess    Advised trying sour candy or lemon drops to help increase saliva production additionally keeping hydrated    Follow-up as needed if symptoms worsen    See AVS summary for additional recommendations reviewed with patient during this visit.       Cayden Frausto MD   [facility]    Soraya Fernandez is a 49 year old male who presents to clinic today for the following health issues:  Chief Complaint   Patient presents with    Urgent Care     Patients Rheumatologist wanted to get checked out. Has lupus. Has some pain and inflammation. Right side of right cheek/parotid, ear, neck, and eye area. Has been going on since Saturday night. Woke up today with a blood nose. Has never happened before.     HPI    Patient was advised by his rheumatology office to be evaluated today for his concerns of right-sided cheek and ear discomfort.  He denies any hearing difficulties or ear drainage.  No fevers.  Has not felt any masses.  He reports he is on methotrexate and this can increase his risk of infections.  He has not had a history of salivary stone.  No exposures to mumps.  No difficulty with swallowing.  No history of TMJ. No visual difficulties. Fully vaccinated. No testicular/scrotal pain noted.     No reported visual changes. no rash of the face.     Patient Active Problem List    Diagnosis Date Noted    Cardiomegaly 09/07/2023     Priority: Medium    Pericardial effusion 09/07/2023     Priority: Medium    Pleural effusion on left 09/07/2023     Priority: Medium    Right ureteral stone 09/07/2023     Priority: Medium    Chest pain, unspecified type 09/07/2023     Priority: Medium    Bilateral  nephrolithiasis 09/07/2023     Priority: Medium    Hypercoagulation syndrome (H24) 11/30/2020     Priority: Medium     Lupus Anticoagulant      Hyperparathyroidism (H24) 01/04/2019     Priority: Medium    Ureterolithiasis 06/23/2015     Priority: Medium    GERD (gastroesophageal reflux disease) 05/11/2012     Priority: Medium    Chronic rhinitis 04/17/2009     Priority: Medium    History of pulmonary embolism 09/01/2008     Priority: Medium    Systemic lupus erythematosus (H) 09/01/2008     Priority: Medium    Anxiety state 07/22/2005     Priority: Medium     Problem list name updated by automated process. Provider to review         Current Outpatient Medications   Medication Sig Dispense Refill    acetaminophen (TYLENOL) 500 MG tablet Take 2 tablets (1,000 mg) by mouth every 6 hours as needed for mild pain 100 tablet 0    atorvastatin (LIPITOR) 10 MG tablet Take 1 tablet (10 mg) by mouth daily 90 tablet 4    docusate sodium (COLACE) 100 MG capsule Take 1 capsule (100 mg) by mouth 2 times daily as needed for constipation 30 capsule 0    fluticasone (FLONASE) 50 MCG/ACT nasal spray Spray 1-2 sprays into both nostrils daily 48 g 3    folic acid 0.8 MG CAPS       hydroxychloroquine (PLAQUENIL) 200 MG tablet Take 1 tablet (200 mg) by mouth 2 times daily      ketoconazole (NIZORAL) 2 % external shampoo Use topically 1-2 times a week      LORazepam (ATIVAN) 0.5 MG tablet Take 1 tablet (0.5 mg) by mouth every 6 hours as needed for anxiety 20 tablet 0    methotrexate 2.5 MG tablet Take by mouth once a week      NONFORMULARY Take 1 capsule by mouth 2 times daily Methyl Guard Plus      Omega-3 Fatty Acids (FISH OIL) 1200 MG CPDR Take 1,200 Units by mouth 2 times daily      oxyBUTYnin (DITROPAN) 5 MG tablet Take 1 tablet (5 mg) by mouth 3 times daily as needed for bladder spasms 30 tablet 0    oxyCODONE (ROXICODONE) 5 MG tablet Take 1 tablet (5 mg) by mouth every 6 hours as needed for severe pain (IF pain not managed with  non-pharmacological and non-opioid interventions) 6 tablet 0    potassium citrate (UROCIT-K) 10 MEQ (1080 MG) CR tablet Take 2 tablets (20 mEq) by mouth 2 times daily (with meals) 120 tablet 11    predniSONE (DELTASONE) 5 MG tablet Take 12.5 mg by mouth daily      propranolol (INDERAL) 10 MG tablet Take 1 tablet (10 mg) by mouth 2 times daily as needed (anxiety) 30 tablet 2    triamcinolone (KENALOG) 0.1 % external cream Apply topically to affected areas 1-2 times a week      venlafaxine (EFFEXOR XR) 150 MG 24 hr capsule Take 2 capsules (300 mg) by mouth daily 180 capsule 4    VITAMIN D, CHOLECALCIFEROL, PO Take 1,000 Units by mouth daily      warfarin ANTICOAGULANT (COUMADIN) 5 MG tablet Take by mouth daily , 7.5mg on Wednesdays & 10mg on all other days of the week       No current facility-administered medications for this visit.     Facility-Administered Medications Ordered in Other Visits   Medication Dose Route Frequency Provider Last Rate Last Admin    sodium chloride (PF) 0.9% PF flush 10-40 mL  10-40 mL Intracatheter Once PRN Souleymane Hamm MD               Objective    BP (!) 137/94 (BP Location: Right arm)   Pulse 84   Temp 97.7  F (36.5  C) (Tympanic)   SpO2 98%   Physical Exam         Face: no asymmetry  Neck; no masses, parotid gland R side is the area of concern of discomfort  R Ear: no mastoiditis, no ear canal lesions, normal Tm  Mouth: no trismus, no TMJ pain and absent of clicking or subluxation atht ejoint    No results found for any visits on 07/01/24.                  The use of [employer] services may have been used to construct the content in this note; any grammatical or spelling errors are non-intentional. Please contact the author of this note directly if you are in need of any clarification.

## 2024-07-01 NOTE — PATIENT INSTRUCTIONS
Tylenol 500 mg every 4 hours as needed for minor discomfort      Try lemon drops or sour candy to increase saliva production for a possible salivary stone that may be causing your mild discomfort    Drink 100 ounces of water a day to keep hydrated      If you see have new mild swelling/pain call your Doctor's office as they may be able to refer you to the acute diagnostic services for imaging during business hours

## 2024-08-15 ENCOUNTER — MEDICAL CORRESPONDENCE (OUTPATIENT)
Dept: HEALTH INFORMATION MANAGEMENT | Facility: CLINIC | Age: 49
End: 2024-08-15
Payer: COMMERCIAL

## 2024-08-22 ENCOUNTER — TRANSFERRED RECORDS (OUTPATIENT)
Dept: HEALTH INFORMATION MANAGEMENT | Facility: CLINIC | Age: 49
End: 2024-08-22
Payer: COMMERCIAL

## 2024-08-23 ENCOUNTER — LAB (OUTPATIENT)
Dept: LAB | Facility: CLINIC | Age: 49
End: 2024-08-23
Payer: COMMERCIAL

## 2024-08-23 DIAGNOSIS — M32.10 SYSTEMIC LUPUS ERYTHEMATOSUS, ORGAN OR SYSTEM INVOLVEMENT UNSPECIFIED (H): ICD-10-CM

## 2024-08-23 DIAGNOSIS — M32.10 SYSTEMIC LUPUS ERYTHEMATOSUS, ORGAN OR SYSTEM INVOLVEMENT UNSPECIFIED (H): Primary | ICD-10-CM

## 2024-08-23 LAB
BASOPHILS # BLD AUTO: 0 10E3/UL (ref 0–0.2)
BASOPHILS NFR BLD AUTO: 1 %
EOSINOPHIL # BLD AUTO: 0 10E3/UL (ref 0–0.7)
EOSINOPHIL NFR BLD AUTO: 0 %
ERYTHROCYTE [DISTWIDTH] IN BLOOD BY AUTOMATED COUNT: 14.4 % (ref 10–15)
ERYTHROCYTE [SEDIMENTATION RATE] IN BLOOD BY WESTERGREN METHOD: 2 MM/HR (ref 0–20)
HCT VFR BLD AUTO: 43.5 % (ref 40–53)
HGB BLD-MCNC: 14.3 G/DL (ref 13.3–17.7)
IMM GRANULOCYTES # BLD: 0 10E3/UL
IMM GRANULOCYTES NFR BLD: 0 %
LYMPHOCYTES # BLD AUTO: 0.4 10E3/UL (ref 0.8–5.3)
LYMPHOCYTES NFR BLD AUTO: 6 %
MCH RBC QN AUTO: 27.9 PG (ref 26.5–33)
MCHC RBC AUTO-ENTMCNC: 32.9 G/DL (ref 31.5–36.5)
MCV RBC AUTO: 85 FL (ref 78–100)
MONOCYTES # BLD AUTO: 0.2 10E3/UL (ref 0–1.3)
MONOCYTES NFR BLD AUTO: 3 %
NEUTROPHILS # BLD AUTO: 5.8 10E3/UL (ref 1.6–8.3)
NEUTROPHILS NFR BLD AUTO: 90 %
PLATELET # BLD AUTO: 209 10E3/UL (ref 150–450)
RBC # BLD AUTO: 5.12 10E6/UL (ref 4.4–5.9)
WBC # BLD AUTO: 6.4 10E3/UL (ref 4–11)

## 2024-08-23 PROCEDURE — 85652 RBC SED RATE AUTOMATED: CPT

## 2024-08-23 PROCEDURE — 86140 C-REACTIVE PROTEIN: CPT

## 2024-08-23 PROCEDURE — 83090 ASSAY OF HOMOCYSTEINE: CPT

## 2024-08-23 PROCEDURE — 84450 TRANSFERASE (AST) (SGOT): CPT

## 2024-08-23 PROCEDURE — 36415 COLL VENOUS BLD VENIPUNCTURE: CPT

## 2024-08-23 PROCEDURE — 82610 CYSTATIN C: CPT

## 2024-08-23 PROCEDURE — 85025 COMPLETE CBC W/AUTO DIFF WBC: CPT

## 2024-08-23 PROCEDURE — 84460 ALANINE AMINO (ALT) (SGPT): CPT

## 2024-08-24 LAB
ALT SERPL W P-5'-P-CCNC: 41 U/L (ref 0–70)
AST SERPL W P-5'-P-CCNC: 42 U/L (ref 0–45)
CRP SERPL-MCNC: <3 MG/L
CYSTATIN C (ROCHE): 1 MG/L (ref 0.6–1)
GFR/BSA.PRED SERPLBLD CYS-BASED-ARV: 81 ML/MIN/1.73M2
HCYS SERPL-SCNC: 12.3 UMOL/L (ref 0–15)

## 2024-09-04 ENCOUNTER — OFFICE VISIT (OUTPATIENT)
Dept: PEDIATRICS | Facility: CLINIC | Age: 49
End: 2024-09-04
Payer: COMMERCIAL

## 2024-09-04 VITALS
DIASTOLIC BLOOD PRESSURE: 86 MMHG | BODY MASS INDEX: 24.75 KG/M2 | HEIGHT: 69 IN | OXYGEN SATURATION: 99 % | HEART RATE: 91 BPM | WEIGHT: 167.1 LBS | TEMPERATURE: 97.6 F | RESPIRATION RATE: 18 BRPM | SYSTOLIC BLOOD PRESSURE: 118 MMHG

## 2024-09-04 DIAGNOSIS — D68.59 HYPERCOAGULATION SYNDROME (H): ICD-10-CM

## 2024-09-04 DIAGNOSIS — E21.3 HYPERPARATHYROIDISM (H): ICD-10-CM

## 2024-09-04 DIAGNOSIS — Z00.00 ROUTINE GENERAL MEDICAL EXAMINATION AT A HEALTH CARE FACILITY: Primary | ICD-10-CM

## 2024-09-04 DIAGNOSIS — E78.5 HYPERLIPIDEMIA LDL GOAL <130: ICD-10-CM

## 2024-09-04 DIAGNOSIS — F41.1 ANXIETY STATE: ICD-10-CM

## 2024-09-04 LAB — HBA1C MFR BLD: 5.3 % (ref 0–5.6)

## 2024-09-04 PROCEDURE — 80053 COMPREHEN METABOLIC PANEL: CPT | Performed by: INTERNAL MEDICINE

## 2024-09-04 PROCEDURE — 83970 ASSAY OF PARATHORMONE: CPT | Performed by: INTERNAL MEDICINE

## 2024-09-04 PROCEDURE — 82306 VITAMIN D 25 HYDROXY: CPT | Performed by: INTERNAL MEDICINE

## 2024-09-04 PROCEDURE — 82172 ASSAY OF APOLIPOPROTEIN: CPT | Mod: 90 | Performed by: INTERNAL MEDICINE

## 2024-09-04 PROCEDURE — 99396 PREV VISIT EST AGE 40-64: CPT | Performed by: INTERNAL MEDICINE

## 2024-09-04 PROCEDURE — 36415 COLL VENOUS BLD VENIPUNCTURE: CPT | Performed by: INTERNAL MEDICINE

## 2024-09-04 PROCEDURE — 84100 ASSAY OF PHOSPHORUS: CPT | Performed by: INTERNAL MEDICINE

## 2024-09-04 PROCEDURE — 83036 HEMOGLOBIN GLYCOSYLATED A1C: CPT | Performed by: INTERNAL MEDICINE

## 2024-09-04 PROCEDURE — 99000 SPECIMEN HANDLING OFFICE-LAB: CPT | Performed by: INTERNAL MEDICINE

## 2024-09-04 PROCEDURE — 80061 LIPID PANEL: CPT | Performed by: INTERNAL MEDICINE

## 2024-09-04 RX ORDER — METHOTREXATE SODIUM 2.5 MG/1
10 TABLET ORAL WEEKLY
COMMUNITY
Start: 2024-09-04

## 2024-09-04 RX ORDER — PROPRANOLOL HYDROCHLORIDE 10 MG/1
10 TABLET ORAL 2 TIMES DAILY PRN
Qty: 30 TABLET | Refills: 2 | Status: SHIPPED | OUTPATIENT
Start: 2024-09-04

## 2024-09-04 RX ORDER — LORAZEPAM 0.5 MG/1
0.5 TABLET ORAL EVERY 6 HOURS PRN
Qty: 20 TABLET | Refills: 0 | Status: SHIPPED | OUTPATIENT
Start: 2024-09-04

## 2024-09-04 RX ORDER — ATORVASTATIN CALCIUM 10 MG/1
10 TABLET, FILM COATED ORAL DAILY
Qty: 90 TABLET | Refills: 4 | Status: SHIPPED | OUTPATIENT
Start: 2024-09-04

## 2024-09-04 RX ORDER — VENLAFAXINE HYDROCHLORIDE 150 MG/1
300 CAPSULE, EXTENDED RELEASE ORAL DAILY
Qty: 180 CAPSULE | Refills: 4 | Status: SHIPPED | OUTPATIENT
Start: 2024-09-04

## 2024-09-04 ASSESSMENT — PAIN SCALES - GENERAL: PAINLEVEL: NO PAIN (0)

## 2024-09-04 NOTE — PROGRESS NOTES
Preventive Care Visit  [facility]  Good Mooney MD, Internal Medicine - Pediatrics  Sep 4, 2024      Assessment & Plan       ICD-10-CM    1. Routine general medical examination at a health care facility  Z00.00 Lipid panel reflex to direct LDL Fasting     Comprehensive metabolic panel     Vitamin D Deficiency     Parathyroid Hormone Intact     Phosphorus     Apolipoprotein B     Hemoglobin A1c     Lipid panel reflex to direct LDL Fasting     Comprehensive metabolic panel     Vitamin D Deficiency     Parathyroid Hormone Intact     Phosphorus     Apolipoprotein B     Hemoglobin A1c      2. Hyperlipidemia LDL goal <130  E78.5 Lipid panel reflex to direct LDL Fasting     Apolipoprotein B     atorvastatin (LIPITOR) 10 MG tablet     Lipid panel reflex to direct LDL Fasting     Apolipoprotein B      3. Anxiety state  F41.1 LORazepam (ATIVAN) 0.5 MG tablet     propranolol (INDERAL) 10 MG tablet     venlafaxine (EFFEXOR XR) 150 MG 24 hr capsule      4. Hyperparathyroidism (H24)  E21.3 Vitamin D Deficiency     Parathyroid Hormone Intact     Phosphorus     Vitamin D Deficiency     Parathyroid Hormone Intact     Phosphorus      5. Hypercoagulation syndrome (H24)  D68.59         Overall he is doing well.  We reviewed appropriate lab work to order today as well as his health maintenance.  Continue with atorvastatin for lipids.  Continue lorazepam and propranolol as well as venlafaxine for anxiety management.      MD Soraya Hammond   Jim is a 49 year old, presenting for the following:  Physical        9/4/2024    10:44 AM   Additional Questions   Roomed by Brandie         9/4/2024    10:44 AM   Patient Reported Additional Medications   Patient reports taking the following new medications none        Health Care Directive  Patient does not have a Health Care Directive or Living Will: Discussed advance care planning with patient; information given to patient to review.    KELSEY Fernandez is here for  his annual wellness visit.  Overall he is feeling well.  We reviewed his health maintenance.    Also reviewed chronic medical conditions.    Hyperlipidemia.  He is tolerating statin well.  No issues with the medication.    Anxiety.  Symptoms are overall under good control.  He treats with lorazepam and propranolol both as needed.    Hyperparathyroidism.  Chronically known.  We discussed lab work to check today.    Hypercoagulation syndrome.  Chronic anticoagulation.  This is overall going well.          9/4/2024   General Health   How would you rate your overall physical health? Good   Feel stress (tense, anxious, or unable to sleep) Only a little      (!) STRESS CONCERN      9/4/2024   Nutrition   Three or more servings of calcium each day? Yes   Diet: Low salt    Gluten-free/reduced   How many servings of fruit and vegetables per day? 4 or more   How many sweetened beverages each day? 0-1            9/4/2024   Exercise   Days per week of moderate/strenous exercise 7 days   Average minutes spent exercising at this level 30 min            9/4/2024   Social Factors   Frequency of gathering with friends or relatives Twice a week   Worry food won't last until get money to buy more No   Food not last or not have enough money for food? No   Do you have housing? (Housing is defined as stable permanent housing and does not include staying ouside in a car, in a tent, in an abandoned building, in an overnight shelter, or couch-surfing.) Yes   Are you worried about losing your housing? No   Lack of transportation? No   Unable to get utilities (heat,electricity)? No            9/4/2024   Fall Risk   Fallen 2 or more times in the past year? No   Trouble with walking or balance? No             9/4/2024   Dental   Dentist two times every year? Yes            9/4/2024   TB Screening   Were you born outside of the [place]? No            Today's PHQ-2 Score:       1/29/2024     2:11 PM   PHQ-2 ( 1999 [employer])   Q1: Little interest or  "pleasure in doing things 0   Q2: Feeling down, depressed or hopeless 0   PHQ-2 Score 0         9/4/2024   Substance Use   Alcohol more than 3/day or more than 7/wk No   Do you use any other substances recreationally? No        Social History     Tobacco Use    Smoking status: Never    Smokeless tobacco: Never   Vaping Use    Vaping status: Never Used   Substance Use Topics    Alcohol use: Yes     Alcohol/week: 0.0 standard drinks of alcohol     Comment: three per week    Drug use: No           9/4/2024   STI Screening   New sexual partner(s) since last STI/HIV test? No      ASCVD Risk   The 10-year ASCVD risk score (Lorena BROWNLEE, et al., 2019) is: 1.9%    Values used to calculate the score:      Age: 49 years      Sex: Male      Is Non- : No      Diabetic: No      Tobacco smoker: No      Systolic Blood Pressure: 118 mmHg      Is BP treated: No      HDL Cholesterol: 57 mg/dL      Total Cholesterol: 144 mg/dL           Objective    Exam  /86   Pulse 91   Temp 97.6  F (36.4  C) (Tympanic)   Resp 18   Ht 1.746 m (5' 8.75\")   Wt 75.8 kg (167 lb 1.6 oz)   SpO2 99%   BMI 24.86 kg/m     Estimated body mass index is 24.86 kg/m  as calculated from the following:    Height as of this encounter: 1.746 m (5' 8.75\").    Weight as of this encounter: 75.8 kg (167 lb 1.6 oz).    Physical Exam  GENERAL: healthy, alert and no distress  EYES: PERRL, EOMI  HENT: ear canals and TM's normal. No nasal discharge. OP moist.  NECK: no adenopathy  RESP: lungs clear to auscultation - no rales, rhonchi or wheezes  CV: regular rate and rhythm, normal S1 S2, no murmur, no peripheral edema  ABDOMEN: soft, nontender, bowel sounds normal  MS: no gross musculoskeletal defects noted  SKIN: no suspicious lesions or rashes  NEURO: Normal strength and tone  PSYCH: mentation appears normal, affect normal         Signed Electronically by: Good Mooney MD    "

## 2024-09-05 LAB
ALBUMIN SERPL BCG-MCNC: 4.2 G/DL (ref 3.5–5.2)
ALP SERPL-CCNC: 48 U/L (ref 40–150)
ALT SERPL W P-5'-P-CCNC: 35 U/L (ref 0–70)
ANION GAP SERPL CALCULATED.3IONS-SCNC: 7 MMOL/L (ref 7–15)
APO B100 SERPL-MCNC: 59 MG/DL
AST SERPL W P-5'-P-CCNC: 36 U/L (ref 0–45)
BILIRUB SERPL-MCNC: 0.3 MG/DL
BUN SERPL-MCNC: 13.3 MG/DL (ref 6–20)
CALCIUM SERPL-MCNC: 8.9 MG/DL (ref 8.8–10.4)
CHLORIDE SERPL-SCNC: 105 MMOL/L (ref 98–107)
CHOLEST SERPL-MCNC: 133 MG/DL
CREAT SERPL-MCNC: 1.18 MG/DL (ref 0.67–1.17)
EGFRCR SERPLBLD CKD-EPI 2021: 75 ML/MIN/1.73M2
FASTING STATUS PATIENT QL REPORTED: YES
FASTING STATUS PATIENT QL REPORTED: YES
GLUCOSE SERPL-MCNC: 85 MG/DL (ref 70–99)
HCO3 SERPL-SCNC: 28 MMOL/L (ref 22–29)
HDLC SERPL-MCNC: 54 MG/DL
LDLC SERPL CALC-MCNC: 59 MG/DL
NONHDLC SERPL-MCNC: 79 MG/DL
PHOSPHATE SERPL-MCNC: 2.4 MG/DL (ref 2.5–4.5)
POTASSIUM SERPL-SCNC: 4 MMOL/L (ref 3.4–5.3)
PROT SERPL-MCNC: 6.4 G/DL (ref 6.4–8.3)
PTH-INTACT SERPL-MCNC: 52 PG/ML (ref 15–65)
SODIUM SERPL-SCNC: 140 MMOL/L (ref 135–145)
TRIGL SERPL-MCNC: 102 MG/DL
VIT D+METAB SERPL-MCNC: 35 NG/ML (ref 20–50)

## 2024-09-06 ENCOUNTER — TELEPHONE (OUTPATIENT)
Dept: PEDIATRICS | Facility: CLINIC | Age: 49
End: 2024-09-06
Payer: COMMERCIAL

## 2024-09-06 NOTE — TELEPHONE ENCOUNTER
Attempt 1: sent pt mycnaun asking how he would like to get form back    Form is completed and in the completed portion of the black tower at [facility]    Edie Smith, VF

## 2024-09-06 NOTE — TELEPHONE ENCOUNTER
Forms/Letter Request    Type of form/letter: OTHER: health screening form        Do we have the form/letter: Yes: form was brought in by pt on 9/4 appt. Has been completed by Jesica already    Who is the form from? Patient    Where did/will the form come from? Patient or family brought in       When is form/letter needed by: next avail    How would you like the form/letter returned:  Unknown    Patient Notified form requests are processed in 5-7 business days: N/A    Could we send this information to you in [employer] or would you prefer to receive a phone call?:   No preference

## 2024-09-18 DIAGNOSIS — M32.10 SYSTEMIC LUPUS ERYTHEMATOSUS WITH ORGAN SYSTEM INVOLVEMENT (H): Primary | ICD-10-CM

## 2024-10-24 DIAGNOSIS — D68.59 PRIMARY HYPERCOAGULABLE STATE (H): Primary | ICD-10-CM

## 2024-10-25 ENCOUNTER — LAB (OUTPATIENT)
Dept: LAB | Facility: CLINIC | Age: 49
End: 2024-10-25
Payer: COMMERCIAL

## 2024-10-25 DIAGNOSIS — D68.59 PRIMARY HYPERCOAGULABLE STATE (H): ICD-10-CM

## 2024-10-25 PROCEDURE — 85260 CLOT FACTOR X STUART-POWER: CPT

## 2024-10-25 PROCEDURE — 36415 COLL VENOUS BLD VENIPUNCTURE: CPT

## 2024-10-26 LAB — FACT X ACT/NOR PPP CHRO: 34 % (ref 70–130)

## 2024-11-11 ENCOUNTER — TRANSFERRED RECORDS (OUTPATIENT)
Dept: HEALTH INFORMATION MANAGEMENT | Facility: CLINIC | Age: 49
End: 2024-11-11
Payer: COMMERCIAL

## 2024-11-15 ENCOUNTER — LAB (OUTPATIENT)
Dept: LAB | Facility: CLINIC | Age: 49
End: 2024-11-15
Payer: COMMERCIAL

## 2024-11-15 DIAGNOSIS — M32.10 SYSTEMIC LUPUS ERYTHEMATOSUS WITH ORGAN SYSTEM INVOLVEMENT (H): ICD-10-CM

## 2024-11-15 LAB
BASOPHILS # BLD AUTO: 0 10E3/UL (ref 0–0.2)
BASOPHILS NFR BLD AUTO: 1 %
EOSINOPHIL # BLD AUTO: 0.1 10E3/UL (ref 0–0.7)
EOSINOPHIL NFR BLD AUTO: 2 %
ERYTHROCYTE [DISTWIDTH] IN BLOOD BY AUTOMATED COUNT: 14.5 % (ref 10–15)
ERYTHROCYTE [SEDIMENTATION RATE] IN BLOOD BY WESTERGREN METHOD: 2 MM/HR (ref 0–20)
HCT VFR BLD AUTO: 45.2 % (ref 40–53)
HGB BLD-MCNC: 14.4 G/DL (ref 13.3–17.7)
IMM GRANULOCYTES # BLD: 0 10E3/UL
IMM GRANULOCYTES NFR BLD: 0 %
LYMPHOCYTES # BLD AUTO: 0.6 10E3/UL (ref 0.8–5.3)
LYMPHOCYTES NFR BLD AUTO: 10 %
MCH RBC QN AUTO: 27.3 PG (ref 26.5–33)
MCHC RBC AUTO-ENTMCNC: 31.9 G/DL (ref 31.5–36.5)
MCV RBC AUTO: 86 FL (ref 78–100)
MONOCYTES # BLD AUTO: 0.6 10E3/UL (ref 0–1.3)
MONOCYTES NFR BLD AUTO: 10 %
NEUTROPHILS # BLD AUTO: 4.4 10E3/UL (ref 1.6–8.3)
NEUTROPHILS NFR BLD AUTO: 77 %
PLATELET # BLD AUTO: 195 10E3/UL (ref 150–450)
RBC # BLD AUTO: 5.28 10E6/UL (ref 4.4–5.9)
WBC # BLD AUTO: 5.7 10E3/UL (ref 4–11)

## 2024-11-15 PROCEDURE — 36415 COLL VENOUS BLD VENIPUNCTURE: CPT

## 2024-11-15 PROCEDURE — 82610 CYSTATIN C: CPT

## 2024-11-15 PROCEDURE — 84450 TRANSFERASE (AST) (SGOT): CPT

## 2024-11-15 PROCEDURE — 83090 ASSAY OF HOMOCYSTEINE: CPT

## 2024-11-15 PROCEDURE — 85652 RBC SED RATE AUTOMATED: CPT

## 2024-11-15 PROCEDURE — 86140 C-REACTIVE PROTEIN: CPT

## 2024-11-15 PROCEDURE — 84460 ALANINE AMINO (ALT) (SGPT): CPT

## 2024-11-15 PROCEDURE — 85025 COMPLETE CBC W/AUTO DIFF WBC: CPT

## 2024-11-16 LAB
ALT SERPL W P-5'-P-CCNC: 33 U/L (ref 0–70)
AST SERPL W P-5'-P-CCNC: 34 U/L (ref 0–45)
CRP SERPL-MCNC: <3 MG/L
CYSTATIN C (ROCHE): 1 MG/L (ref 0.6–1)
GFR/BSA.PRED SERPLBLD CYS-BASED-ARV: 81 ML/MIN/1.73M2
HCYS SERPL-SCNC: 10.5 UMOL/L (ref 0–15)

## 2025-01-09 ENCOUNTER — LAB (OUTPATIENT)
Dept: LAB | Facility: CLINIC | Age: 50
End: 2025-01-09
Payer: COMMERCIAL

## 2025-01-09 DIAGNOSIS — D68.59 PRIMARY HYPERCOAGULABLE STATE: ICD-10-CM

## 2025-01-31 ENCOUNTER — HOSPITAL ENCOUNTER (OUTPATIENT)
Dept: CT IMAGING | Facility: CLINIC | Age: 50
Discharge: HOME | End: 2025-01-31
Attending: PHYSICIAN ASSISTANT | Admitting: PHYSICIAN ASSISTANT
Payer: COMMERCIAL

## 2025-01-31 DIAGNOSIS — N20.0 BILATERAL NEPHROLITHIASIS: ICD-10-CM

## 2025-01-31 PROCEDURE — 74176 CT ABD & PELVIS W/O CONTRAST: CPT

## 2025-02-10 ENCOUNTER — TELEPHONE (OUTPATIENT)
Dept: UROLOGY | Facility: CLINIC | Age: 50
End: 2025-02-10
Payer: COMMERCIAL

## 2025-02-10 NOTE — TELEPHONE ENCOUNTER
----- Message from Charissa Guerra sent at 2/10/2025 11:15 AM CST -----  Wednesday has a 3:30 open or Friday at 3:30 is open, could be virtual.  ----- Message -----  From: Lupe Gamez  Sent: 2/7/2025   9:39 AM CST  To: Charissa Guerra PA-C    Should I work him in somewhere?  ----- Message -----  From: Charissa Guerra PA-C  Sent: 2/5/2025   4:23 PM CST  To: [facility] - Scheduling Pool    Patient did CT, but no appointment scheduled for review.

## 2025-02-12 ENCOUNTER — LAB (OUTPATIENT)
Dept: LAB | Facility: CLINIC | Age: 50
End: 2025-02-12
Payer: COMMERCIAL

## 2025-02-12 DIAGNOSIS — Z79.899 OTHER LONG TERM (CURRENT) DRUG THERAPY: ICD-10-CM

## 2025-02-12 DIAGNOSIS — M32.10 SYSTEMIC LUPUS ERYTHEMATOSUS WITH ORGAN SYSTEM INVOLVEMENT (H): ICD-10-CM

## 2025-02-12 DIAGNOSIS — D68.59 PRIMARY HYPERCOAGULABLE STATE: ICD-10-CM

## 2025-02-12 LAB
BASOPHILS # BLD AUTO: 0 10E3/UL (ref 0–0.2)
BASOPHILS NFR BLD AUTO: 1 %
EOSINOPHIL # BLD AUTO: 0.2 10E3/UL (ref 0–0.7)
EOSINOPHIL NFR BLD AUTO: 3 %
ERYTHROCYTE [DISTWIDTH] IN BLOOD BY AUTOMATED COUNT: 14.1 % (ref 10–15)
ERYTHROCYTE [SEDIMENTATION RATE] IN BLOOD BY WESTERGREN METHOD: 1 MM/HR (ref 0–20)
HCT VFR BLD AUTO: 46.2 % (ref 40–53)
HGB BLD-MCNC: 15.1 G/DL (ref 13.3–17.7)
IMM GRANULOCYTES # BLD: 0 10E3/UL
IMM GRANULOCYTES NFR BLD: 0 %
LYMPHOCYTES # BLD AUTO: 0.9 10E3/UL (ref 0.8–5.3)
LYMPHOCYTES NFR BLD AUTO: 19 %
MCH RBC QN AUTO: 27.2 PG (ref 26.5–33)
MCHC RBC AUTO-ENTMCNC: 32.7 G/DL (ref 31.5–36.5)
MCV RBC AUTO: 83 FL (ref 78–100)
MONOCYTES # BLD AUTO: 0.6 10E3/UL (ref 0–1.3)
MONOCYTES NFR BLD AUTO: 13 %
NEUTROPHILS # BLD AUTO: 3 10E3/UL (ref 1.6–8.3)
NEUTROPHILS NFR BLD AUTO: 64 %
PLATELET # BLD AUTO: 212 10E3/UL (ref 150–450)
RBC # BLD AUTO: 5.56 10E6/UL (ref 4.4–5.9)
WBC # BLD AUTO: 4.7 10E3/UL (ref 4–11)

## 2025-02-12 PROCEDURE — 85260 CLOT FACTOR X STUART-POWER: CPT

## 2025-02-12 PROCEDURE — 84460 ALANINE AMINO (ALT) (SGPT): CPT

## 2025-02-12 PROCEDURE — 36415 COLL VENOUS BLD VENIPUNCTURE: CPT

## 2025-02-12 PROCEDURE — 85025 COMPLETE CBC W/AUTO DIFF WBC: CPT

## 2025-02-12 PROCEDURE — 82565 ASSAY OF CREATININE: CPT

## 2025-02-12 PROCEDURE — 85652 RBC SED RATE AUTOMATED: CPT

## 2025-02-12 PROCEDURE — 86140 C-REACTIVE PROTEIN: CPT

## 2025-02-12 PROCEDURE — 84450 TRANSFERASE (AST) (SGOT): CPT

## 2025-02-12 PROCEDURE — 83090 ASSAY OF HOMOCYSTEINE: CPT

## 2025-02-12 PROCEDURE — 82610 CYSTATIN C: CPT

## 2025-02-12 PROCEDURE — 82040 ASSAY OF SERUM ALBUMIN: CPT

## 2025-02-13 LAB
ALBUMIN SERPL BCG-MCNC: 4.4 G/DL (ref 3.5–5.2)
ALT SERPL W P-5'-P-CCNC: 46 U/L (ref 0–70)
AST SERPL W P-5'-P-CCNC: 42 U/L (ref 0–45)
CREAT SERPL-MCNC: 1.09 MG/DL (ref 0.67–1.17)
CRP SERPL-MCNC: <3 MG/L
CYSTATIN C (ROCHE): 1.1 MG/L (ref 0.6–1)
EGFRCR SERPLBLD CKD-EPI 2021: 82 ML/MIN/1.73M2
FACT X ACT/NOR PPP CHRO: 23 % (ref 70–130)
GFR/BSA.PRED SERPLBLD CYS-BASED-ARV: 71 ML/MIN/1.73M2
HCYS SERPL-SCNC: 11.9 UMOL/L (ref 0–15)

## 2025-03-03 ENCOUNTER — MYC MEDICAL ADVICE (OUTPATIENT)
Dept: PEDIATRICS | Facility: CLINIC | Age: 50
End: 2025-03-03
Payer: COMMERCIAL

## 2025-03-03 DIAGNOSIS — E78.5 HYPERLIPIDEMIA LDL GOAL <130: Primary | ICD-10-CM

## 2025-03-03 DIAGNOSIS — I99.8 VASCULAR CALCIFICATION: ICD-10-CM

## 2025-03-20 ENCOUNTER — TELEPHONE (OUTPATIENT)
Dept: CARDIOLOGY | Facility: CLINIC | Age: 50
End: 2025-03-20
Payer: COMMERCIAL

## 2025-03-20 NOTE — TELEPHONE ENCOUNTER
1st attempt- Left voicemail for the patient to call back and schedule the following:    Appointment type:  Testing only- No clinic visit  Provider:  -  Return date:  1st available  Additional appointment(s) needed:  CT scan  Additional Notes:  -  Specialty phone number: [phone]

## 2025-03-25 ENCOUNTER — LAB (OUTPATIENT)
Dept: LAB | Facility: CLINIC | Age: 50
End: 2025-03-25
Payer: COMMERCIAL

## 2025-03-25 DIAGNOSIS — D68.59 PRIMARY HYPERCOAGULABLE STATE: Primary | ICD-10-CM

## 2025-03-25 LAB — HOLD SPECIMEN: NORMAL

## 2025-03-25 PROCEDURE — 36415 COLL VENOUS BLD VENIPUNCTURE: CPT

## 2025-03-26 DIAGNOSIS — D68.59 PRIMARY HYPERCOAGULABLE STATE: Primary | ICD-10-CM

## 2025-03-27 ENCOUNTER — TELEPHONE (OUTPATIENT)
Dept: PEDIATRICS | Facility: CLINIC | Age: 50
End: 2025-03-27
Payer: COMMERCIAL

## 2025-05-02 ENCOUNTER — HOSPITAL ENCOUNTER (OUTPATIENT)
Dept: CARDIOLOGY | Facility: CLINIC | Age: 50
Discharge: HOME | End: 2025-05-02
Attending: INTERNAL MEDICINE | Admitting: INTERNAL MEDICINE
Payer: COMMERCIAL

## 2025-05-02 VITALS — HEART RATE: 51 BPM | SYSTOLIC BLOOD PRESSURE: 137 MMHG | DIASTOLIC BLOOD PRESSURE: 93 MMHG

## 2025-05-02 DIAGNOSIS — I99.8 VASCULAR CALCIFICATION: ICD-10-CM

## 2025-05-02 DIAGNOSIS — E78.5 HYPERLIPIDEMIA LDL GOAL <130: ICD-10-CM

## 2025-05-02 PROCEDURE — 75580 N-INVAS EST C FFR SW ALY CTA: CPT | Mod: 26 | Performed by: INTERNAL MEDICINE

## 2025-05-02 PROCEDURE — 75580 N-INVAS EST C FFR SW ALY CTA: CPT

## 2025-05-02 PROCEDURE — 250N000013 HC RX MED GY IP 250 OP 250 PS 637: Performed by: INTERNAL MEDICINE

## 2025-05-02 PROCEDURE — 75574 CT ANGIO HRT W/3D IMAGE: CPT | Mod: 26 | Performed by: INTERNAL MEDICINE

## 2025-05-02 PROCEDURE — 75574 CT ANGIO HRT W/3D IMAGE: CPT

## 2025-05-02 PROCEDURE — 250N000011 HC RX IP 250 OP 636: Performed by: INTERNAL MEDICINE

## 2025-05-02 RX ORDER — METOPROLOL TARTRATE 1 MG/ML
5-20 INJECTION, SOLUTION INTRAVENOUS
Status: DISCONTINUED | OUTPATIENT
Start: 2025-05-02 | End: 2025-05-03 | Stop reason: HOSPADM

## 2025-05-02 RX ORDER — METOPROLOL TARTRATE 25 MG/1
25-100 TABLET, FILM COATED ORAL
Status: COMPLETED | OUTPATIENT
Start: 2025-05-02 | End: 2025-05-02

## 2025-05-02 RX ORDER — IOPAMIDOL 755 MG/ML
500 INJECTION, SOLUTION INTRAVASCULAR ONCE
Status: COMPLETED | OUTPATIENT
Start: 2025-05-02 | End: 2025-05-02

## 2025-05-02 RX ORDER — IVABRADINE 5 MG/1
5-15 TABLET, FILM COATED ORAL
Status: COMPLETED | OUTPATIENT
Start: 2025-05-02 | End: 2025-05-02

## 2025-05-02 RX ORDER — DILTIAZEM HCL 60 MG
120 TABLET ORAL
Status: DISCONTINUED | OUTPATIENT
Start: 2025-05-02 | End: 2025-05-03 | Stop reason: HOSPADM

## 2025-05-02 RX ORDER — NITROGLYCERIN 0.4 MG/1
0.4 TABLET SUBLINGUAL
Status: DISCONTINUED | OUTPATIENT
Start: 2025-05-02 | End: 2025-05-03 | Stop reason: HOSPADM

## 2025-05-02 RX ORDER — LIDOCAINE 40 MG/G
CREAM TOPICAL
Status: DISCONTINUED | OUTPATIENT
Start: 2025-05-02 | End: 2025-05-03 | Stop reason: HOSPADM

## 2025-05-02 RX ORDER — ONDANSETRON 2 MG/ML
4 INJECTION INTRAMUSCULAR; INTRAVENOUS
Status: DISCONTINUED | OUTPATIENT
Start: 2025-05-02 | End: 2025-05-03 | Stop reason: HOSPADM

## 2025-05-02 RX ORDER — DILTIAZEM HYDROCHLORIDE 5 MG/ML
10-15 INJECTION INTRAVENOUS
Status: DISCONTINUED | OUTPATIENT
Start: 2025-05-02 | End: 2025-05-03 | Stop reason: HOSPADM

## 2025-05-02 RX ADMIN — IOPAMIDOL 110 ML: 755 INJECTION, SOLUTION INTRAVENOUS at 13:25

## 2025-05-02 RX ADMIN — IVABRADINE 15 MG: 5 TABLET, FILM COATED ORAL at 09:28

## 2025-05-02 RX ADMIN — NITROGLYCERIN 0.4 MG: 0.4 TABLET SUBLINGUAL at 10:46

## 2025-05-02 RX ADMIN — METOPROLOL TARTRATE 100 MG: 50 TABLET, FILM COATED ORAL at 09:27

## 2025-05-06 ENCOUNTER — MYC MEDICAL ADVICE (OUTPATIENT)
Dept: PEDIATRICS | Facility: CLINIC | Age: 50
End: 2025-05-06
Payer: COMMERCIAL

## 2025-05-06 DIAGNOSIS — R93.1 AGATSTON CORONARY ARTERY CALCIUM SCORE LESS THAN 100: ICD-10-CM

## 2025-05-06 DIAGNOSIS — E78.5 HYPERLIPIDEMIA LDL GOAL <130: Primary | ICD-10-CM

## 2025-05-07 RX ORDER — ATORVASTATIN CALCIUM 40 MG/1
40 TABLET, FILM COATED ORAL DAILY
Qty: 90 TABLET | Refills: 3 | Status: SHIPPED | OUTPATIENT
Start: 2025-05-07

## 2025-05-07 NOTE — TELEPHONE ENCOUNTER
Patient responds to recent result note:  Agreeable to increase atorvastatin to 40mg  Agreeable to cards referral    Both pended, please review and sign    Mackenzie Taveras RN

## 2025-05-08 ENCOUNTER — PATIENT OUTREACH (OUTPATIENT)
Dept: CARE COORDINATION | Facility: CLINIC | Age: 50
End: 2025-05-08

## 2025-05-08 ENCOUNTER — LAB (OUTPATIENT)
Dept: LAB | Facility: CLINIC | Age: 50
End: 2025-05-08
Payer: COMMERCIAL

## 2025-05-08 DIAGNOSIS — M32.10 SYSTEMIC LUPUS ERYTHEMATOSUS WITH ORGAN SYSTEM INVOLVEMENT (H): ICD-10-CM

## 2025-05-08 DIAGNOSIS — D68.59 PRIMARY HYPERCOAGULABLE STATE: ICD-10-CM

## 2025-05-08 LAB
ALT SERPL W P-5'-P-CCNC: 37 U/L (ref 0–70)
AST SERPL W P-5'-P-CCNC: 42 U/L (ref 0–45)
BASOPHILS # BLD AUTO: 0 10E3/UL (ref 0–0.2)
BASOPHILS NFR BLD AUTO: 1 %
CRP SERPL-MCNC: <3 MG/L
CYSTATIN C (ROCHE): 1 MG/L (ref 0.6–1)
EOSINOPHIL # BLD AUTO: 0.2 10E3/UL (ref 0–0.7)
EOSINOPHIL NFR BLD AUTO: 3 %
ERYTHROCYTE [DISTWIDTH] IN BLOOD BY AUTOMATED COUNT: 14.5 % (ref 10–15)
ERYTHROCYTE [SEDIMENTATION RATE] IN BLOOD BY WESTERGREN METHOD: 1 MM/HR (ref 0–20)
GFR/BSA.PRED SERPLBLD CYS-BASED-ARV: 81 ML/MIN/1.73M2
HCT VFR BLD AUTO: 44.6 % (ref 40–53)
HCYS SERPL-SCNC: 11.2 UMOL/L (ref 0–15)
HGB BLD-MCNC: 14.4 G/DL (ref 13.3–17.7)
IMM GRANULOCYTES # BLD: 0 10E3/UL
IMM GRANULOCYTES NFR BLD: 0 %
LYMPHOCYTES # BLD AUTO: 0.8 10E3/UL (ref 0.8–5.3)
LYMPHOCYTES NFR BLD AUTO: 11 %
MCH RBC QN AUTO: 27.2 PG (ref 26.5–33)
MCHC RBC AUTO-ENTMCNC: 32.3 G/DL (ref 31.5–36.5)
MCV RBC AUTO: 84 FL (ref 78–100)
MONOCYTES # BLD AUTO: 0.5 10E3/UL (ref 0–1.3)
MONOCYTES NFR BLD AUTO: 8 %
NEUTROPHILS # BLD AUTO: 5.4 10E3/UL (ref 1.6–8.3)
NEUTROPHILS NFR BLD AUTO: 78 %
PLATELET # BLD AUTO: 219 10E3/UL (ref 150–450)
RBC # BLD AUTO: 5.29 10E6/UL (ref 4.4–5.9)
WBC # BLD AUTO: 6.9 10E3/UL (ref 4–11)

## 2025-05-12 ENCOUNTER — PATIENT OUTREACH (OUTPATIENT)
Dept: CARE COORDINATION | Facility: CLINIC | Age: 50
End: 2025-05-12
Payer: COMMERCIAL

## 2025-05-13 ENCOUNTER — TRANSFERRED RECORDS (OUTPATIENT)
Dept: HEALTH INFORMATION MANAGEMENT | Facility: CLINIC | Age: 50
End: 2025-05-13
Payer: COMMERCIAL

## 2025-06-18 ENCOUNTER — LAB (OUTPATIENT)
Dept: LAB | Facility: CLINIC | Age: 50
End: 2025-06-18
Payer: COMMERCIAL

## 2025-06-18 DIAGNOSIS — D68.59 PRIMARY HYPERCOAGULABLE STATE: ICD-10-CM

## 2025-06-18 PROCEDURE — 36415 COLL VENOUS BLD VENIPUNCTURE: CPT

## 2025-06-18 PROCEDURE — 85260 CLOT FACTOR X STUART-POWER: CPT

## 2025-06-19 LAB — FACT X ACT/NOR PPP CHRO: 19 % (ref 70–130)

## 2025-07-23 ENCOUNTER — LAB (OUTPATIENT)
Dept: LAB | Facility: CLINIC | Age: 50
End: 2025-07-23
Payer: COMMERCIAL

## 2025-07-23 DIAGNOSIS — D68.59 PRIMARY HYPERCOAGULABLE STATE: ICD-10-CM

## 2025-07-23 PROCEDURE — 36415 COLL VENOUS BLD VENIPUNCTURE: CPT

## 2025-07-23 PROCEDURE — 85260 CLOT FACTOR X STUART-POWER: CPT

## 2025-07-24 LAB — FACT X ACT/NOR PPP CHRO: 25 % (ref 70–130)

## 2025-07-27 DIAGNOSIS — D68.59 PRIMARY HYPERCOAGULABLE STATE: Primary | ICD-10-CM

## 2025-08-04 ENCOUNTER — TRANSFERRED RECORDS (OUTPATIENT)
Dept: HEALTH INFORMATION MANAGEMENT | Facility: CLINIC | Age: 50
End: 2025-08-04
Payer: COMMERCIAL

## 2025-08-05 ENCOUNTER — PATIENT OUTREACH (OUTPATIENT)
Dept: CARE COORDINATION | Facility: CLINIC | Age: 50
End: 2025-08-05
Payer: COMMERCIAL

## 2025-08-05 DIAGNOSIS — I25.10 CORONARY ATHEROSCLEROSIS OF NATIVE CORONARY ARTERY: Primary | ICD-10-CM

## 2025-08-05 DIAGNOSIS — I31.9 DISEASE OF PERICARDIUM: ICD-10-CM

## 2025-08-11 ENCOUNTER — MYC MEDICAL ADVICE (OUTPATIENT)
Dept: PEDIATRICS | Facility: CLINIC | Age: 50
End: 2025-08-11
Payer: COMMERCIAL

## 2025-08-19 ENCOUNTER — PATIENT OUTREACH (OUTPATIENT)
Dept: CARE COORDINATION | Facility: CLINIC | Age: 50
End: 2025-08-19
Payer: COMMERCIAL

## 2025-08-21 DIAGNOSIS — Z51.81 ENCOUNTER FOR THERAPEUTIC DRUG MONITORING: ICD-10-CM

## 2025-08-21 DIAGNOSIS — M32.10 LUPOID NEPHRITIS (H): Primary | ICD-10-CM

## 2025-08-21 DIAGNOSIS — N08 LUPOID NEPHRITIS (H): Primary | ICD-10-CM

## 2025-08-23 ENCOUNTER — LAB (OUTPATIENT)
Dept: LAB | Facility: CLINIC | Age: 50
End: 2025-08-23
Payer: COMMERCIAL

## 2025-08-23 DIAGNOSIS — D68.59 PRIMARY HYPERCOAGULABLE STATE: ICD-10-CM

## 2025-08-23 PROCEDURE — 36415 COLL VENOUS BLD VENIPUNCTURE: CPT

## 2025-08-23 PROCEDURE — 85260 CLOT FACTOR X STUART-POWER: CPT

## 2025-08-25 LAB — FACT X ACT/NOR PPP CHRO: 18 % (ref 70–130)

## 2025-08-28 ENCOUNTER — TRANSCRIBE ORDERS (OUTPATIENT)
Dept: OTHER | Age: 50
End: 2025-08-28

## 2025-08-28 DIAGNOSIS — R76.8 POSITIVE ANA (ANTINUCLEAR ANTIBODY): Primary | ICD-10-CM

## (undated) DEVICE — LINEN FULL SHEET 5511

## (undated) DEVICE — LASER FIBER HOLMIUM MOSES 200 D/F/L AC-10030100

## (undated) DEVICE — LINEN HALF SHEET 5512

## (undated) DEVICE — ESU GROUND PAD ADULT W/CORD E7507

## (undated) DEVICE — TUBING IRRIG TUR Y TYPE 96" LF 6543-01

## (undated) DEVICE — BASKET NITINOL TIPLESS HALO  1.5FRX120CM 554120

## (undated) DEVICE — SHEATH URETERAL ACCESS NAVIGATOR HD 11/13FRX46CM M0062502230

## (undated) DEVICE — RAD RX ISOVUE 300 (50ML) 61% IOPAMIDOL CHARGE PER ML

## (undated) DEVICE — BAG CLEAR TRASH 1.3M 39X33" P4040C

## (undated) DEVICE — GLOVE BIOGEL PI ULTRATOUCH SZ 7.5 41175

## (undated) DEVICE — ENDO SNARE POLYPECTOMY OVAL 15MM LOOP SD-240U-15

## (undated) DEVICE — PREP SCRUB SOL EXIDINE 4% CHG 4OZ 29002-404

## (undated) DEVICE — CATH URETERAL FLEX TIP TIGERTAIL 06FRX70CM 139006

## (undated) DEVICE — ADAPTER CATH CHECK-FLO 9FR FLL 050885 G15476

## (undated) DEVICE — PACK CYSTO CUSTOM RIDGES

## (undated) DEVICE — PAD CHUX UNDERPAD 23X24" 7136

## (undated) DEVICE — UROSEAL ADJUSTABLE ENDOSCOPIC VALVE

## (undated) DEVICE — SOL NACL 0.9% IRRIG 1000ML BOTTLE 2F7124

## (undated) DEVICE — SOL NACL 0.9% IRRIG 3000ML BAG 2B7477

## (undated) DEVICE — Device

## (undated) DEVICE — KIT ENDO TURNOVER/PROCEDURE W/CLEAN A SCOPE LINERS 103888

## (undated) DEVICE — ENDO TRAP POLYP QUICK CATCH 710201

## (undated) DEVICE — SHEATH URETERAL ACCESS NAVIGATOR HD 12/14FRX46CM M0062502260

## (undated) DEVICE — GUIDEWIRE URO STR STIFF .035"X150CM NITINOL 150NSS35

## (undated) DEVICE — SOL WATER IRRIG 1000ML BOTTLE 2F7114

## (undated) DEVICE — PAD CHUX UNDERPAD 30X36" P3036C

## (undated) DEVICE — DRAPE GYN/UROLOGY FLUID POUCH TUR 29455

## (undated) DEVICE — GLOVE BIOGEL PI MICRO SZ 7.0 48570

## (undated) DEVICE — COVER FOOTSWITCH W/CINCH 20X24" 923267

## (undated) DEVICE — FIBER LASER 200 UM DISPOSABLE TFL-FBX200S

## (undated) DEVICE — PREP TECHNI-CARE CHLOROXYLENOL 3% 4OZ BOTTLE C222-4ZWO

## (undated) DEVICE — GLOVE BIOGEL PI MICRO INDICATOR UNDERGLOVE SZ 7.0 48970

## (undated) DEVICE — PACK CYSTOSCOPY SBA15CYFSI

## (undated) RX ORDER — ONDANSETRON 2 MG/ML
INJECTION INTRAMUSCULAR; INTRAVENOUS
Status: DISPENSED
Start: 2023-09-07

## (undated) RX ORDER — PROPOFOL 10 MG/ML
INJECTION, EMULSION INTRAVENOUS
Status: DISPENSED
Start: 2023-10-10

## (undated) RX ORDER — METOPROLOL TARTRATE 1 MG/ML
INJECTION, SOLUTION INTRAVENOUS
Status: DISPENSED
Start: 2023-10-10

## (undated) RX ORDER — FENTANYL CITRATE 0.05 MG/ML
INJECTION, SOLUTION INTRAMUSCULAR; INTRAVENOUS
Status: DISPENSED
Start: 2023-09-25

## (undated) RX ORDER — OXYCODONE HYDROCHLORIDE 5 MG/1
TABLET ORAL
Status: DISPENSED
Start: 2023-09-25

## (undated) RX ORDER — FENTANYL CITRATE 50 UG/ML
INJECTION, SOLUTION INTRAMUSCULAR; INTRAVENOUS
Status: DISPENSED
Start: 2023-09-25

## (undated) RX ORDER — ONDANSETRON 2 MG/ML
INJECTION INTRAMUSCULAR; INTRAVENOUS
Status: DISPENSED
Start: 2023-10-10

## (undated) RX ORDER — IVABRADINE 5 MG/1
TABLET, FILM COATED ORAL
Status: DISPENSED
Start: 2025-05-02

## (undated) RX ORDER — FENTANYL CITRATE 50 UG/ML
INJECTION, SOLUTION INTRAMUSCULAR; INTRAVENOUS
Status: DISPENSED
Start: 2023-10-10

## (undated) RX ORDER — LIDOCAINE HYDROCHLORIDE 10 MG/ML
INJECTION, SOLUTION EPIDURAL; INFILTRATION; INTRACAUDAL; PERINEURAL
Status: DISPENSED
Start: 2023-09-07

## (undated) RX ORDER — HYDROMORPHONE HCL IN WATER/PF 6 MG/30 ML
PATIENT CONTROLLED ANALGESIA SYRINGE INTRAVENOUS
Status: DISPENSED
Start: 2023-09-07

## (undated) RX ORDER — ONDANSETRON 2 MG/ML
INJECTION INTRAMUSCULAR; INTRAVENOUS
Status: DISPENSED
Start: 2023-09-25

## (undated) RX ORDER — GLYCOPYRROLATE 0.2 MG/ML
INJECTION INTRAMUSCULAR; INTRAVENOUS
Status: DISPENSED
Start: 2023-10-10

## (undated) RX ORDER — HYDROMORPHONE HCL IN WATER/PF 6 MG/30 ML
PATIENT CONTROLLED ANALGESIA SYRINGE INTRAVENOUS
Status: DISPENSED
Start: 2023-09-25

## (undated) RX ORDER — METOPROLOL TARTRATE 50 MG
TABLET ORAL
Status: DISPENSED
Start: 2025-05-02

## (undated) RX ORDER — PROPOFOL 10 MG/ML
INJECTION, EMULSION INTRAVENOUS
Status: DISPENSED
Start: 2023-09-25

## (undated) RX ORDER — HYDROMORPHONE HYDROCHLORIDE 1 MG/ML
INJECTION, SOLUTION INTRAMUSCULAR; INTRAVENOUS; SUBCUTANEOUS
Status: DISPENSED
Start: 2023-09-25

## (undated) RX ORDER — HYDROXYZINE HYDROCHLORIDE 25 MG/1
TABLET, FILM COATED ORAL
Status: DISPENSED
Start: 2023-09-25

## (undated) RX ORDER — GENTAMICIN SULFATE 80 MG/100ML
INJECTION, SOLUTION INTRAVENOUS
Status: DISPENSED
Start: 2023-09-25

## (undated) RX ORDER — FENTANYL CITRATE 50 UG/ML
INJECTION, SOLUTION INTRAMUSCULAR; INTRAVENOUS
Status: DISPENSED
Start: 2023-09-07

## (undated) RX ORDER — ACETAMINOPHEN 500 MG
TABLET ORAL
Status: DISPENSED
Start: 2023-09-25

## (undated) RX ORDER — DEXAMETHASONE SODIUM PHOSPHATE 4 MG/ML
INJECTION, SOLUTION INTRA-ARTICULAR; INTRALESIONAL; INTRAMUSCULAR; INTRAVENOUS; SOFT TISSUE
Status: DISPENSED
Start: 2023-09-07

## (undated) RX ORDER — FENTANYL CITRATE 0.05 MG/ML
INJECTION, SOLUTION INTRAMUSCULAR; INTRAVENOUS
Status: DISPENSED
Start: 2022-04-12

## (undated) RX ORDER — DEXAMETHASONE SODIUM PHOSPHATE 4 MG/ML
INJECTION, SOLUTION INTRA-ARTICULAR; INTRALESIONAL; INTRAMUSCULAR; INTRAVENOUS; SOFT TISSUE
Status: DISPENSED
Start: 2023-09-25

## (undated) RX ORDER — PROPOFOL 10 MG/ML
INJECTION, EMULSION INTRAVENOUS
Status: DISPENSED
Start: 2023-09-07

## (undated) RX ORDER — LABETALOL HYDROCHLORIDE 5 MG/ML
INJECTION, SOLUTION INTRAVENOUS
Status: DISPENSED
Start: 2023-09-25

## (undated) RX ORDER — DEXAMETHASONE SODIUM PHOSPHATE 4 MG/ML
INJECTION, SOLUTION INTRA-ARTICULAR; INTRALESIONAL; INTRAMUSCULAR; INTRAVENOUS; SOFT TISSUE
Status: DISPENSED
Start: 2023-10-10

## (undated) RX ORDER — GLYCOPYRROLATE 0.2 MG/ML
INJECTION, SOLUTION INTRAMUSCULAR; INTRAVENOUS
Status: DISPENSED
Start: 2023-09-25

## (undated) RX ORDER — OXYCODONE HYDROCHLORIDE 5 MG/1
TABLET ORAL
Status: DISPENSED
Start: 2023-10-10

## (undated) RX ORDER — ERTAPENEM 1 G/1
INJECTION, POWDER, LYOPHILIZED, FOR SOLUTION INTRAMUSCULAR; INTRAVENOUS
Status: DISPENSED
Start: 2023-10-10